# Patient Record
Sex: FEMALE | Race: WHITE | Employment: OTHER | URBAN - NONMETROPOLITAN AREA
[De-identification: names, ages, dates, MRNs, and addresses within clinical notes are randomized per-mention and may not be internally consistent; named-entity substitution may affect disease eponyms.]

---

## 2017-01-23 ENCOUNTER — OFFICE VISIT (OUTPATIENT)
Dept: PHYSICAL MEDICINE AND REHAB | Age: 52
End: 2017-01-23

## 2017-01-23 VITALS
WEIGHT: 166 LBS | BODY MASS INDEX: 26.68 KG/M2 | SYSTOLIC BLOOD PRESSURE: 123 MMHG | DIASTOLIC BLOOD PRESSURE: 71 MMHG | HEART RATE: 69 BPM | HEIGHT: 66 IN

## 2017-01-23 DIAGNOSIS — G82.54 INCOMPLETE QUADRIPLEGIA AT C5-C8 LEVEL (HCC): Primary | ICD-10-CM

## 2017-01-23 PROCEDURE — 99214 OFFICE O/P EST MOD 30 MIN: CPT | Performed by: PHYSICAL MEDICINE & REHABILITATION

## 2017-01-23 RX ORDER — DULOXETIN HYDROCHLORIDE 30 MG/1
90 CAPSULE, DELAYED RELEASE ORAL DAILY
Qty: 90 CAPSULE | Refills: 5 | Status: SHIPPED | OUTPATIENT
Start: 2017-01-23 | End: 2017-08-09

## 2017-04-25 ENCOUNTER — OFFICE VISIT (OUTPATIENT)
Dept: PHYSICAL MEDICINE AND REHAB | Age: 52
End: 2017-04-25

## 2017-04-25 VITALS
DIASTOLIC BLOOD PRESSURE: 77 MMHG | SYSTOLIC BLOOD PRESSURE: 138 MMHG | WEIGHT: 169 LBS | BODY MASS INDEX: 27.16 KG/M2 | HEIGHT: 66 IN | HEART RATE: 79 BPM

## 2017-04-25 DIAGNOSIS — G82.54 INCOMPLETE QUADRIPLEGIA AT C5-C8 LEVEL (HCC): Primary | ICD-10-CM

## 2017-04-25 PROCEDURE — 99213 OFFICE O/P EST LOW 20 MIN: CPT | Performed by: NURSE PRACTITIONER

## 2017-04-25 RX ORDER — GABAPENTIN 600 MG/1
TABLET ORAL
Qty: 90 TABLET | Refills: 5 | Status: SHIPPED | OUTPATIENT
Start: 2017-04-25 | End: 2017-07-25 | Stop reason: DRUGHIGH

## 2017-04-25 RX ORDER — TRAMADOL HYDROCHLORIDE 50 MG/1
50 TABLET ORAL 2 TIMES DAILY PRN
Qty: 30 TABLET | Refills: 0 | Status: SHIPPED | OUTPATIENT
Start: 2017-04-25 | End: 2017-07-25 | Stop reason: SDUPTHER

## 2017-04-25 RX ORDER — AMITRIPTYLINE HYDROCHLORIDE 25 MG/1
25 TABLET, FILM COATED ORAL NIGHTLY
Qty: 30 TABLET | Refills: 5 | Status: SHIPPED | OUTPATIENT
Start: 2017-04-25 | End: 2017-07-25 | Stop reason: DRUGHIGH

## 2017-05-16 ENCOUNTER — OFFICE VISIT (OUTPATIENT)
Dept: PHYSICAL MEDICINE AND REHAB | Age: 52
End: 2017-05-16

## 2017-05-16 VITALS
SYSTOLIC BLOOD PRESSURE: 120 MMHG | BODY MASS INDEX: 27.18 KG/M2 | DIASTOLIC BLOOD PRESSURE: 77 MMHG | WEIGHT: 169.09 LBS | HEART RATE: 73 BPM | HEIGHT: 66 IN

## 2017-05-16 DIAGNOSIS — M25.532 LEFT WRIST PAIN: ICD-10-CM

## 2017-05-16 DIAGNOSIS — G82.54 INCOMPLETE QUADRIPLEGIA AT C5-C8 LEVEL (HCC): ICD-10-CM

## 2017-05-16 DIAGNOSIS — G82.54 INCOMPLETE QUADRIPLEGIA AT C5-C8 LEVEL (HCC): Primary | ICD-10-CM

## 2017-05-16 DIAGNOSIS — T79.2XXD TRAUMATIC SEROMA OF LEFT THIGH, SUBSEQUENT ENCOUNTER: ICD-10-CM

## 2017-05-16 DIAGNOSIS — S70.02XS HEMATOMA OF LEFT HIP, SEQUELA: ICD-10-CM

## 2017-05-16 DIAGNOSIS — G89.29 CHRONIC MIDLINE LOW BACK PAIN, WITH SCIATICA PRESENCE UNSPECIFIED: ICD-10-CM

## 2017-05-16 DIAGNOSIS — M54.5 CHRONIC MIDLINE LOW BACK PAIN, WITH SCIATICA PRESENCE UNSPECIFIED: ICD-10-CM

## 2017-05-16 DIAGNOSIS — M79.604 PAIN IN BOTH LOWER EXTREMITIES: ICD-10-CM

## 2017-05-16 DIAGNOSIS — M79.605 PAIN IN BOTH LOWER EXTREMITIES: ICD-10-CM

## 2017-05-16 DIAGNOSIS — G62.9 NEUROPATHY: ICD-10-CM

## 2017-05-16 PROCEDURE — 99244 OFF/OP CNSLTJ NEW/EST MOD 40: CPT | Performed by: NURSE PRACTITIONER

## 2017-05-16 RX ORDER — BUPRENORPHINE 5 UG/H
1 PATCH TRANSDERMAL WEEKLY
Qty: 4 PATCH | Refills: 0 | Status: SHIPPED | OUTPATIENT
Start: 2017-05-16 | End: 2017-06-13

## 2017-05-16 RX ORDER — AMITRIPTYLINE HYDROCHLORIDE 50 MG/1
50 TABLET, FILM COATED ORAL NIGHTLY
Qty: 30 TABLET | Refills: 1 | Status: SHIPPED | OUTPATIENT
Start: 2017-05-16 | End: 2017-07-19 | Stop reason: SDUPTHER

## 2017-05-16 RX ORDER — GABAPENTIN 800 MG/1
800 TABLET ORAL 3 TIMES DAILY
Qty: 90 TABLET | Refills: 1 | Status: SHIPPED | OUTPATIENT
Start: 2017-05-16 | End: 2017-07-19 | Stop reason: SDUPTHER

## 2017-05-16 RX ORDER — BUPRENORPHINE 5 UG/H
1 PATCH TRANSDERMAL WEEKLY
Qty: 2 PATCH | Refills: 0 | Status: SHIPPED | OUTPATIENT
Start: 2017-05-16 | End: 2017-05-16 | Stop reason: SDUPTHER

## 2017-05-16 ASSESSMENT — ENCOUNTER SYMPTOMS
DIARRHEA: 0
PHOTOPHOBIA: 0
ABDOMINAL PAIN: 0
VOMITING: 0
COUGH: 0
NAUSEA: 0
BACK PAIN: 0
SINUS PRESSURE: 0
EYE PAIN: 0
SHORTNESS OF BREATH: 0
WHEEZING: 0
COLOR CHANGE: 0
SORE THROAT: 0
CHEST TIGHTNESS: 0
RHINORRHEA: 0
CONSTIPATION: 0

## 2017-05-22 RX ORDER — TRAMADOL HYDROCHLORIDE 50 MG/1
TABLET ORAL
Qty: 112 TABLET | Refills: 0 | Status: SHIPPED | OUTPATIENT
Start: 2017-05-22 | End: 2017-07-25 | Stop reason: SDUPTHER

## 2017-07-14 DIAGNOSIS — T79.2XXD TRAUMATIC SEROMA OF LEFT THIGH, SUBSEQUENT ENCOUNTER: ICD-10-CM

## 2017-07-14 DIAGNOSIS — G62.9 NEUROPATHY: ICD-10-CM

## 2017-07-14 DIAGNOSIS — M54.5 CHRONIC MIDLINE LOW BACK PAIN, WITH SCIATICA PRESENCE UNSPECIFIED: ICD-10-CM

## 2017-07-14 DIAGNOSIS — S70.02XS HEMATOMA OF LEFT HIP, SEQUELA: ICD-10-CM

## 2017-07-14 DIAGNOSIS — M79.604 PAIN IN BOTH LOWER EXTREMITIES: ICD-10-CM

## 2017-07-14 DIAGNOSIS — M79.605 PAIN IN BOTH LOWER EXTREMITIES: ICD-10-CM

## 2017-07-14 DIAGNOSIS — G82.54 INCOMPLETE QUADRIPLEGIA AT C5-C8 LEVEL (HCC): ICD-10-CM

## 2017-07-14 DIAGNOSIS — G89.29 CHRONIC MIDLINE LOW BACK PAIN, WITH SCIATICA PRESENCE UNSPECIFIED: ICD-10-CM

## 2017-07-14 DIAGNOSIS — M25.532 LEFT WRIST PAIN: ICD-10-CM

## 2017-07-14 RX ORDER — TRAMADOL HYDROCHLORIDE 50 MG/1
TABLET ORAL
Qty: 240 TABLET | Refills: 0 | Status: CANCELLED | OUTPATIENT
Start: 2017-07-14

## 2017-07-14 RX ORDER — GABAPENTIN 800 MG/1
800 TABLET ORAL 3 TIMES DAILY
Qty: 90 TABLET | Refills: 1 | Status: CANCELLED | OUTPATIENT
Start: 2017-07-14 | End: 2017-08-13

## 2017-07-14 RX ORDER — AMITRIPTYLINE HYDROCHLORIDE 50 MG/1
50 TABLET, FILM COATED ORAL NIGHTLY
Qty: 30 TABLET | Refills: 1 | Status: CANCELLED | OUTPATIENT
Start: 2017-07-14 | End: 2017-08-13

## 2017-07-19 DIAGNOSIS — G89.29 CHRONIC MIDLINE LOW BACK PAIN, WITH SCIATICA PRESENCE UNSPECIFIED: ICD-10-CM

## 2017-07-19 DIAGNOSIS — S70.02XS HEMATOMA OF LEFT HIP, SEQUELA: ICD-10-CM

## 2017-07-19 DIAGNOSIS — G62.9 NEUROPATHY: ICD-10-CM

## 2017-07-19 DIAGNOSIS — M25.532 LEFT WRIST PAIN: ICD-10-CM

## 2017-07-19 DIAGNOSIS — G82.54 INCOMPLETE QUADRIPLEGIA AT C5-C8 LEVEL (HCC): ICD-10-CM

## 2017-07-19 DIAGNOSIS — M79.605 PAIN IN BOTH LOWER EXTREMITIES: ICD-10-CM

## 2017-07-19 DIAGNOSIS — M54.5 CHRONIC MIDLINE LOW BACK PAIN, WITH SCIATICA PRESENCE UNSPECIFIED: ICD-10-CM

## 2017-07-19 DIAGNOSIS — M79.604 PAIN IN BOTH LOWER EXTREMITIES: ICD-10-CM

## 2017-07-19 DIAGNOSIS — T79.2XXD TRAUMATIC SEROMA OF LEFT THIGH, SUBSEQUENT ENCOUNTER: ICD-10-CM

## 2017-07-19 RX ORDER — TRAMADOL HYDROCHLORIDE 50 MG/1
50 TABLET ORAL EVERY 6 HOURS PRN
Qty: 120 TABLET | Refills: 0 | Status: SHIPPED | OUTPATIENT
Start: 2017-07-19 | End: 2017-08-18

## 2017-07-19 RX ORDER — AMITRIPTYLINE HYDROCHLORIDE 50 MG/1
50 TABLET, FILM COATED ORAL NIGHTLY
Qty: 30 TABLET | Refills: 1 | Status: SHIPPED | OUTPATIENT
Start: 2017-07-19 | End: 2017-09-18 | Stop reason: SDUPTHER

## 2017-07-19 RX ORDER — HYDROCODONE BITARTRATE AND ACETAMINOPHEN 7.5; 325 MG/1; MG/1
1 TABLET ORAL 2 TIMES DAILY PRN
Qty: 60 TABLET | Refills: 0 | Status: SHIPPED | OUTPATIENT
Start: 2017-07-19 | End: 2017-08-18

## 2017-07-19 RX ORDER — GABAPENTIN 800 MG/1
800 TABLET ORAL 3 TIMES DAILY
Qty: 90 TABLET | Refills: 1 | Status: SHIPPED | OUTPATIENT
Start: 2017-07-19 | End: 2017-08-09

## 2017-07-25 ENCOUNTER — OFFICE VISIT (OUTPATIENT)
Dept: PHYSICAL MEDICINE AND REHAB | Age: 52
End: 2017-07-25
Payer: COMMERCIAL

## 2017-07-25 VITALS
DIASTOLIC BLOOD PRESSURE: 81 MMHG | HEART RATE: 85 BPM | SYSTOLIC BLOOD PRESSURE: 114 MMHG | WEIGHT: 171.96 LBS | BODY MASS INDEX: 27.64 KG/M2 | HEIGHT: 66 IN

## 2017-07-25 VITALS
HEART RATE: 85 BPM | WEIGHT: 172 LBS | SYSTOLIC BLOOD PRESSURE: 114 MMHG | BODY MASS INDEX: 27.64 KG/M2 | DIASTOLIC BLOOD PRESSURE: 81 MMHG | HEIGHT: 66 IN

## 2017-07-25 DIAGNOSIS — M79.605 PAIN IN BOTH LOWER EXTREMITIES: ICD-10-CM

## 2017-07-25 DIAGNOSIS — M54.5 CHRONIC MIDLINE LOW BACK PAIN, WITH SCIATICA PRESENCE UNSPECIFIED: ICD-10-CM

## 2017-07-25 DIAGNOSIS — G89.29 CHRONIC MIDLINE LOW BACK PAIN, WITH SCIATICA PRESENCE UNSPECIFIED: ICD-10-CM

## 2017-07-25 DIAGNOSIS — G62.9 NEUROPATHY: ICD-10-CM

## 2017-07-25 DIAGNOSIS — M79.604 PAIN IN BOTH LOWER EXTREMITIES: ICD-10-CM

## 2017-07-25 DIAGNOSIS — G82.54 INCOMPLETE QUADRIPLEGIA AT C5-C8 LEVEL (HCC): Primary | ICD-10-CM

## 2017-07-25 PROCEDURE — 99214 OFFICE O/P EST MOD 30 MIN: CPT | Performed by: PHYSICAL MEDICINE & REHABILITATION

## 2017-07-25 PROCEDURE — 99213 OFFICE O/P EST LOW 20 MIN: CPT | Performed by: NURSE PRACTITIONER

## 2017-07-25 ASSESSMENT — ENCOUNTER SYMPTOMS
RHINORRHEA: 0
WHEEZING: 0
DIARRHEA: 0
EYE PAIN: 0
CHEST TIGHTNESS: 0
ABDOMINAL PAIN: 0
SHORTNESS OF BREATH: 0
SINUS PRESSURE: 0
BACK PAIN: 0
NAUSEA: 0
COUGH: 0
COLOR CHANGE: 0
VOMITING: 0
CONSTIPATION: 0
PHOTOPHOBIA: 0
SORE THROAT: 0

## 2017-07-29 ENCOUNTER — HOSPITAL ENCOUNTER (EMERGENCY)
Age: 52
Discharge: HOME | End: 2017-07-29
Attending: NURSE PRACTITIONER
Payer: COMMERCIAL

## 2017-07-29 ENCOUNTER — HOSPITAL ENCOUNTER (EMERGENCY)
Dept: GENERAL RADIOLOGY | Age: 52
Discharge: HOME | End: 2017-07-29
Payer: COMMERCIAL

## 2017-07-29 VITALS
TEMPERATURE: 98 F | BODY MASS INDEX: 27 KG/M2 | HEIGHT: 67 IN | SYSTOLIC BLOOD PRESSURE: 126 MMHG | DIASTOLIC BLOOD PRESSURE: 68 MMHG | HEART RATE: 70 BPM | OXYGEN SATURATION: 96 % | RESPIRATION RATE: 18 BRPM | WEIGHT: 172 LBS

## 2017-07-29 DIAGNOSIS — S80.02XA CONTUSION OF LEFT KNEE, INITIAL ENCOUNTER: ICD-10-CM

## 2017-07-29 DIAGNOSIS — S60.512A ABRASION OF LEFT HAND, INITIAL ENCOUNTER: ICD-10-CM

## 2017-07-29 DIAGNOSIS — S63.92XA SPRAIN OF LEFT HAND, INITIAL ENCOUNTER: ICD-10-CM

## 2017-07-29 DIAGNOSIS — S80.212A ABRASION, KNEE, LEFT, INITIAL ENCOUNTER: Primary | ICD-10-CM

## 2017-07-29 PROCEDURE — 73130 X-RAY EXAM OF HAND: CPT

## 2017-07-29 PROCEDURE — 99203 OFFICE O/P NEW LOW 30 MIN: CPT | Performed by: NURSE PRACTITIONER

## 2017-07-29 PROCEDURE — 6370000000 HC RX 637 (ALT 250 FOR IP): Performed by: NURSE PRACTITIONER

## 2017-07-29 PROCEDURE — A6447 CONFORM BAND S W >=5"/YD: HCPCS

## 2017-07-29 PROCEDURE — 73564 X-RAY EXAM KNEE 4 OR MORE: CPT

## 2017-07-29 RX ORDER — BACITRACIN, NEOMYCIN, POLYMYXIN B 400; 3.5; 5 [USP'U]/G; MG/G; [USP'U]/G
OINTMENT TOPICAL ONCE
Status: COMPLETED | OUTPATIENT
Start: 2017-07-29 | End: 2017-07-29

## 2017-07-29 RX ADMIN — BACITRACIN, NEOMYCIN, POLYMYXIN B 3.5 G: 400; 3.5; 5 OINTMENT TOPICAL at 10:49

## 2017-07-29 ASSESSMENT — PAIN DESCRIPTION - PAIN TYPE: TYPE: ACUTE PAIN

## 2017-07-29 ASSESSMENT — PAIN DESCRIPTION - DESCRIPTORS: DESCRIPTORS: SHARP

## 2017-07-29 ASSESSMENT — PAIN DESCRIPTION - ONSET: ONSET: SUDDEN

## 2017-07-29 ASSESSMENT — PAIN SCALES - GENERAL: PAINLEVEL_OUTOF10: 8

## 2017-07-29 ASSESSMENT — PAIN DESCRIPTION - ORIENTATION: ORIENTATION: LEFT

## 2017-07-29 ASSESSMENT — PAIN DESCRIPTION - PROGRESSION: CLINICAL_PROGRESSION: NOT CHANGED

## 2017-07-29 ASSESSMENT — PAIN DESCRIPTION - FREQUENCY: FREQUENCY: CONTINUOUS

## 2017-07-29 ASSESSMENT — PAIN DESCRIPTION - LOCATION: LOCATION: HAND;KNEE

## 2017-08-09 ENCOUNTER — OFFICE VISIT (OUTPATIENT)
Dept: FAMILY MEDICINE CLINIC | Age: 52
End: 2017-08-09
Payer: COMMERCIAL

## 2017-08-09 VITALS
SYSTOLIC BLOOD PRESSURE: 102 MMHG | BODY MASS INDEX: 26.37 KG/M2 | WEIGHT: 168 LBS | HEIGHT: 67 IN | DIASTOLIC BLOOD PRESSURE: 64 MMHG | HEART RATE: 70 BPM

## 2017-08-09 DIAGNOSIS — D50.9 IRON DEFICIENCY ANEMIA, UNSPECIFIED IRON DEFICIENCY ANEMIA TYPE: ICD-10-CM

## 2017-08-09 DIAGNOSIS — E11.9 TYPE 2 DIABETES MELLITUS WITHOUT COMPLICATION, WITHOUT LONG-TERM CURRENT USE OF INSULIN (HCC): Primary | Chronic | ICD-10-CM

## 2017-08-09 DIAGNOSIS — R91.1 PULMONARY NODULE: Chronic | ICD-10-CM

## 2017-08-09 DIAGNOSIS — Z13.220 SCREENING FOR HYPERLIPIDEMIA: ICD-10-CM

## 2017-08-09 DIAGNOSIS — Z12.31 ENCOUNTER FOR SCREENING MAMMOGRAM FOR BREAST CANCER: ICD-10-CM

## 2017-08-09 DIAGNOSIS — E55.9 VITAMIN D DEFICIENCY: ICD-10-CM

## 2017-08-09 LAB
CREATININE URINE POCT: 100
HBA1C MFR BLD: 6.7 %
MICROALBUMIN/CREAT 24H UR: 30 MG/G{CREAT}
MICROALBUMIN/CREAT UR-RTO: <30

## 2017-08-09 PROCEDURE — 82044 UR ALBUMIN SEMIQUANTITATIVE: CPT | Performed by: FAMILY MEDICINE

## 2017-08-09 PROCEDURE — 83036 HEMOGLOBIN GLYCOSYLATED A1C: CPT | Performed by: FAMILY MEDICINE

## 2017-08-09 PROCEDURE — 99214 OFFICE O/P EST MOD 30 MIN: CPT | Performed by: FAMILY MEDICINE

## 2017-08-09 RX ORDER — DULOXETIN HYDROCHLORIDE 60 MG/1
60 CAPSULE, DELAYED RELEASE ORAL DAILY
COMMUNITY
End: 2017-10-16 | Stop reason: SDUPTHER

## 2017-08-09 RX ORDER — GABAPENTIN 400 MG/1
400 CAPSULE ORAL DAILY
COMMUNITY
End: 2017-09-18 | Stop reason: SDUPTHER

## 2017-08-09 ASSESSMENT — ENCOUNTER SYMPTOMS
CONSTIPATION: 0
DIARRHEA: 0
CHEST TIGHTNESS: 0
COLOR CHANGE: 0
SHORTNESS OF BREATH: 0
NAUSEA: 0
EYE REDNESS: 0
VOMITING: 0
EYE DISCHARGE: 0

## 2017-08-09 ASSESSMENT — PATIENT HEALTH QUESTIONNAIRE - PHQ9
1. LITTLE INTEREST OR PLEASURE IN DOING THINGS: 0
SUM OF ALL RESPONSES TO PHQ QUESTIONS 1-9: 0
2. FEELING DOWN, DEPRESSED OR HOPELESS: 0
SUM OF ALL RESPONSES TO PHQ9 QUESTIONS 1 & 2: 0

## 2017-08-10 ENCOUNTER — TELEPHONE (OUTPATIENT)
Dept: FAMILY MEDICINE CLINIC | Age: 52
End: 2017-08-10

## 2017-08-11 LAB
BASOPHILS ABSOLUTE: 0 /ΜL
BASOPHILS RELATIVE PERCENT: 0.5 %
BUN BLDV-MCNC: 10 MG/DL
CALCIUM SERPL-MCNC: 9.4 MG/DL
CHLORIDE BLD-SCNC: 105 MMOL/L
CHOLESTEROL, TOTAL: 145 MG/DL
CHOLESTEROL/HDL RATIO: 2.4
CO2: 30 MMOL/L
CREAT SERPL-MCNC: 0.6 MG/DL
EOSINOPHILS ABSOLUTE: 0.1 /ΜL
EOSINOPHILS RELATIVE PERCENT: 2 %
FERRITIN: 12.3 NG/ML (ref 9–150)
GFR CALCULATED: 101
GLUCOSE BLD-MCNC: 97 MG/DL
HCT VFR BLD CALC: 38 % (ref 36–46)
HDLC SERPL-MCNC: 60 MG/DL (ref 35–70)
HEMOGLOBIN: 12.3 G/DL (ref 12–16)
IRON: 79
LDL CHOLESTEROL CALCULATED: 71 MG/DL (ref 0–160)
LYMPHOCYTES ABSOLUTE: 2.3 /ΜL
LYMPHOCYTES RELATIVE PERCENT: 35.1 %
MCH RBC QN AUTO: 27.2 PG
MCHC RBC AUTO-ENTMCNC: 32.4 G/DL
MCV RBC AUTO: 84.1 FL
MONOCYTES ABSOLUTE: 0.6 /ΜL
MONOCYTES RELATIVE PERCENT: 9.3 %
NEUTROPHILS ABSOLUTE: 3.5 /ΜL
NEUTROPHILS RELATIVE PERCENT: 52.8 %
PLATELET # BLD: 272 K/ΜL
PMV BLD AUTO: NORMAL FL
POTASSIUM SERPL-SCNC: 3.7 MMOL/L
RBC # BLD: 4.52 10^6/ΜL
SODIUM BLD-SCNC: 141 MMOL/L
TOTAL IRON BINDING CAPACITY: 385
TRIGL SERPL-MCNC: 70 MG/DL
VITAMIN D 25-HYDROXY: 25
VITAMIN D2, 25 HYDROXY: NORMAL
VITAMIN D3,25 HYDROXY: NORMAL
VLDLC SERPL CALC-MCNC: 14 MG/DL
WBC # BLD: 6.6 10^3/ML

## 2017-08-16 ENCOUNTER — TELEPHONE (OUTPATIENT)
Dept: FAMILY MEDICINE CLINIC | Age: 52
End: 2017-08-16

## 2017-08-16 DIAGNOSIS — E55.9 VITAMIN D DEFICIENCY: Primary | ICD-10-CM

## 2017-08-22 RX ORDER — ERGOCALCIFEROL 1.25 MG/1
50000 CAPSULE ORAL WEEKLY
Qty: 12 CAPSULE | Refills: 0 | Status: SHIPPED | OUTPATIENT
Start: 2017-08-22 | End: 2020-09-02

## 2017-08-24 DIAGNOSIS — D50.9 IRON DEFICIENCY ANEMIA, UNSPECIFIED IRON DEFICIENCY ANEMIA TYPE: ICD-10-CM

## 2017-08-24 DIAGNOSIS — E61.1 IRON DEFICIENCY: Primary | ICD-10-CM

## 2017-08-24 DIAGNOSIS — Z13.220 SCREENING FOR HYPERLIPIDEMIA: ICD-10-CM

## 2017-08-24 DIAGNOSIS — E55.9 VITAMIN D DEFICIENCY: ICD-10-CM

## 2017-08-24 DIAGNOSIS — E11.9 TYPE 2 DIABETES MELLITUS WITHOUT COMPLICATION, WITHOUT LONG-TERM CURRENT USE OF INSULIN (HCC): Chronic | ICD-10-CM

## 2017-08-24 RX ORDER — LANOLIN ALCOHOL/MO/W.PET/CERES
325 CREAM (GRAM) TOPICAL 2 TIMES DAILY
Qty: 180 TABLET | Refills: 0 | Status: ON HOLD | OUTPATIENT
Start: 2017-08-24 | End: 2017-10-16 | Stop reason: ALTCHOICE

## 2017-08-31 ENCOUNTER — TELEPHONE (OUTPATIENT)
Dept: FAMILY MEDICINE CLINIC | Age: 52
End: 2017-08-31

## 2017-08-31 DIAGNOSIS — E61.1 IRON DEFICIENCY: ICD-10-CM

## 2017-08-31 DIAGNOSIS — Z98.84 HISTORY OF GASTRIC BYPASS: Primary | ICD-10-CM

## 2017-09-05 ENCOUNTER — OFFICE VISIT (OUTPATIENT)
Dept: PHYSICAL MEDICINE AND REHAB | Age: 52
End: 2017-09-05
Payer: COMMERCIAL

## 2017-09-05 VITALS
BODY MASS INDEX: 26.06 KG/M2 | HEIGHT: 67 IN | DIASTOLIC BLOOD PRESSURE: 68 MMHG | WEIGHT: 166 LBS | HEART RATE: 74 BPM | SYSTOLIC BLOOD PRESSURE: 107 MMHG

## 2017-09-05 DIAGNOSIS — M54.5 CHRONIC MIDLINE LOW BACK PAIN, WITH SCIATICA PRESENCE UNSPECIFIED: ICD-10-CM

## 2017-09-05 DIAGNOSIS — G89.29 CHRONIC MIDLINE LOW BACK PAIN, WITH SCIATICA PRESENCE UNSPECIFIED: ICD-10-CM

## 2017-09-05 DIAGNOSIS — G89.29 CHRONIC BILATERAL LOW BACK PAIN, WITH SCIATICA PRESENCE UNSPECIFIED: ICD-10-CM

## 2017-09-05 DIAGNOSIS — M54.5 CHRONIC BILATERAL LOW BACK PAIN, WITH SCIATICA PRESENCE UNSPECIFIED: ICD-10-CM

## 2017-09-05 DIAGNOSIS — G62.9 NEUROPATHY: ICD-10-CM

## 2017-09-05 DIAGNOSIS — G82.54 INCOMPLETE QUADRIPLEGIA AT C5-C8 LEVEL (HCC): Primary | ICD-10-CM

## 2017-09-05 PROCEDURE — 99213 OFFICE O/P EST LOW 20 MIN: CPT | Performed by: NURSE PRACTITIONER

## 2017-09-05 RX ORDER — HYDROCODONE BITARTRATE AND ACETAMINOPHEN 7.5; 325 MG/1; MG/1
1 TABLET ORAL 2 TIMES DAILY PRN
Qty: 60 TABLET | Refills: 0 | Status: SHIPPED | OUTPATIENT
Start: 2017-09-05 | End: 2017-10-02 | Stop reason: SDUPTHER

## 2017-09-05 RX ORDER — TRAMADOL HYDROCHLORIDE 50 MG/1
TABLET ORAL
Qty: 120 TABLET | Refills: 0 | Status: SHIPPED | OUTPATIENT
Start: 2017-09-05 | End: 2017-12-11 | Stop reason: SDUPTHER

## 2017-09-05 ASSESSMENT — ENCOUNTER SYMPTOMS
CHEST TIGHTNESS: 0
VOMITING: 0
SORE THROAT: 0
SHORTNESS OF BREATH: 0
RHINORRHEA: 0
CONSTIPATION: 0
PHOTOPHOBIA: 0
ALLERGIC/IMMUNOLOGIC NEGATIVE: 1
COUGH: 0
EYES NEGATIVE: 1
DIARRHEA: 0
ABDOMINAL PAIN: 0
BACK PAIN: 1
WHEEZING: 0
RESPIRATORY NEGATIVE: 1
SINUS PRESSURE: 0
NAUSEA: 0
EYE PAIN: 0
GASTROINTESTINAL NEGATIVE: 1
COLOR CHANGE: 0

## 2017-09-07 ENCOUNTER — TELEPHONE (OUTPATIENT)
Dept: FAMILY MEDICINE CLINIC | Age: 52
End: 2017-09-07

## 2017-09-07 NOTE — TELEPHONE ENCOUNTER
Moises Jason is starting with her Iron infusions this week  And is wondering when she will need follow up labs drawn. She states you had told her to wait 12 weeks after starting the Vitamin D before rechecking that one. Do you want her to get all of them drawn together? We are to call her back.

## 2017-09-08 NOTE — TELEPHONE ENCOUNTER
We can recheck the labs at the same time. As far as her chest CT for the pulmonary nodule, I have been going through her records and cannot determine which lung it was in. .. If the patient can provide us with this information, we would have more luck getting it approved (was formerly denied by insurance).

## 2017-09-11 ENCOUNTER — HOSPITAL ENCOUNTER (OUTPATIENT)
Dept: NURSING | Age: 52
Discharge: HOME | End: 2017-09-11
Payer: COMMERCIAL

## 2017-09-11 ENCOUNTER — HOSPITAL ENCOUNTER (OUTPATIENT)
Dept: PHYSICAL THERAPY | Age: 52
Setting detail: THERAPIES SERIES
Discharge: HOME | End: 2017-09-11
Payer: COMMERCIAL

## 2017-09-11 ENCOUNTER — TELEPHONE (OUTPATIENT)
Dept: PHYSICAL MEDICINE AND REHAB | Age: 52
End: 2017-09-11

## 2017-09-11 VITALS
BODY MASS INDEX: 25.99 KG/M2 | DIASTOLIC BLOOD PRESSURE: 61 MMHG | RESPIRATION RATE: 16 BRPM | SYSTOLIC BLOOD PRESSURE: 111 MMHG | TEMPERATURE: 95.5 F | WEIGHT: 165.6 LBS | HEART RATE: 69 BPM

## 2017-09-11 DIAGNOSIS — D50.9 IRON DEFICIENCY ANEMIA, UNSPECIFIED IRON DEFICIENCY ANEMIA TYPE: ICD-10-CM

## 2017-09-11 PROCEDURE — 6360000002 HC RX W HCPCS: Performed by: FAMILY MEDICINE

## 2017-09-11 PROCEDURE — 97110 THERAPEUTIC EXERCISES: CPT

## 2017-09-11 PROCEDURE — 97162 PT EVAL MOD COMPLEX 30 MIN: CPT

## 2017-09-11 PROCEDURE — G8979 MOBILITY GOAL STATUS: HCPCS

## 2017-09-11 PROCEDURE — 96365 THER/PROPH/DIAG IV INF INIT: CPT

## 2017-09-11 PROCEDURE — 2580000003 HC RX 258: Performed by: FAMILY MEDICINE

## 2017-09-11 PROCEDURE — G8978 MOBILITY CURRENT STATUS: HCPCS

## 2017-09-11 RX ORDER — 0.9 % SODIUM CHLORIDE 0.9 %
10 VIAL (ML) INJECTION ONCE
Status: CANCELLED | OUTPATIENT
Start: 2017-09-11 | End: 2017-09-11

## 2017-09-11 RX ORDER — SODIUM CHLORIDE 9 MG/ML
INJECTION, SOLUTION INTRAVENOUS CONTINUOUS
Status: CANCELLED | OUTPATIENT
Start: 2017-09-11

## 2017-09-11 RX ORDER — METHYLPREDNISOLONE SODIUM SUCCINATE 125 MG/2ML
125 INJECTION, POWDER, LYOPHILIZED, FOR SOLUTION INTRAMUSCULAR; INTRAVENOUS ONCE
Status: CANCELLED | OUTPATIENT
Start: 2017-09-11 | End: 2017-09-11

## 2017-09-11 RX ORDER — SODIUM CHLORIDE 0.9 % (FLUSH) 0.9 %
10 SYRINGE (ML) INJECTION PRN
Status: CANCELLED | OUTPATIENT
Start: 2017-09-11

## 2017-09-11 RX ORDER — DIPHENHYDRAMINE HYDROCHLORIDE 50 MG/ML
50 INJECTION INTRAMUSCULAR; INTRAVENOUS ONCE
Status: CANCELLED | OUTPATIENT
Start: 2017-09-11 | End: 2017-09-11

## 2017-09-11 RX ORDER — HEPARIN SODIUM (PORCINE) LOCK FLUSH IV SOLN 100 UNIT/ML 100 UNIT/ML
500 SOLUTION INTRAVENOUS PRN
Status: CANCELLED | OUTPATIENT
Start: 2017-09-11

## 2017-09-11 RX ORDER — SODIUM CHLORIDE 0.9 % (FLUSH) 0.9 %
5 SYRINGE (ML) INJECTION PRN
Status: CANCELLED | OUTPATIENT
Start: 2017-09-11

## 2017-09-11 RX ADMIN — IRON SUCROSE 200 MG: 20 INJECTION, SOLUTION INTRAVENOUS at 10:46

## 2017-09-11 ASSESSMENT — PAIN DESCRIPTION - DESCRIPTORS: DESCRIPTORS: BURNING;CONSTANT

## 2017-09-11 ASSESSMENT — PAIN SCALES - GENERAL: PAINLEVEL_OUTOF10: 8

## 2017-09-11 ASSESSMENT — PAIN - FUNCTIONAL ASSESSMENT: PAIN_FUNCTIONAL_ASSESSMENT: 0-10

## 2017-09-11 NOTE — IP AVS SNAPSHOT
Patient Information     Patient Name DEBBIE De Leon 1965         This is your updated medication list to keep with you all times      ASK your doctor about these medications     amitriptyline 50 MG tablet   Commonly known as:  ELAVIL   Take 1 tablet by mouth nightly       CYMBALTA 60 MG extended release capsule   Generic drug:  DULoxetine       docusate sodium 100 MG capsule   Commonly known as:  COLACE       ferrous sulfate 325 (65 Fe) MG EC tablet   Commonly known as:  FE TABS   Take 1 tablet by mouth 2 times daily       gabapentin 400 MG capsule   Commonly known as:  NEURONTIN       HAIR SKIN AND NAILS FORMULA PO       HYDROcodone-acetaminophen 7.5-325 MG per tablet   Commonly known as:  NORCO   Take 1 tablet by mouth 2 times daily as needed for Pain .       iron sucrose 20 MG/ML injection   Commonly known as:  VENOFER   Infuse 10 mLs intravenously every 3 days for 5 doses Infuse slowly over at least 5 minutes.        metFORMIN 500 MG tablet   Commonly known as:  GLUCOPHAGE   Take 1 tablet by mouth 2 times daily (with meals)       mirtazapine 15 MG tablet   Commonly known as:  REMERON   Take 1 tablet by mouth nightly       traMADol 50 MG tablet   Commonly known as:  ULTRAM   1-2 tabs BID PRN       vitamin B-12 500 MCG tablet   Commonly known as:  CYANOCOBALAMIN       vitamin D 2000 units Caps capsule       vitamin D 00847 units Caps capsule   Commonly known as:  ERGOCALCIFEROL   Take 1 capsule by mouth once a week for 12 doses

## 2017-09-11 NOTE — PROGRESS NOTES
0793 Alert female admitted for number one iron infusion. 1110 No complaints voiced. 1155 Infusion complete, tolerated well. Home instructions to pt verbalized understanding. Gait steady. 56 Discharged ambulatory stable home.       __met__ Safety:       (Environmental)   [place] to environment   Ensure ID band is correct and in place/ allergy band as needed   Assess for fall risk   Initiate fall precautions as applicable (fall band, side rails, etc.)   Call light within reach   Bed in low position/ wheels locked    __nm__ Pain:        Assess pain level and characteristics   Administer analgesics as ordered   Assess effectiveness of pain management and report to MD as needed     __metssess baseline knowledge   Provide teaching at level of understanding   Provide teaching via preferred learning method   Evaluate teaching effectiveness    __

## 2017-09-11 NOTE — IP AVS SNAPSHOT
After Visit Summary  (Discharge Instructions)    Medication List for Home    Based on the information you provided to us as well as any changes during this visit, the following is your updated medication list.  Compare this with your prescription bottles at home. If you have any questions or concerns, contact your primary care physician's office. Daily Medication List (This medication list can be shared with any healthcare provider who is helping you manage your medications)      ASK your doctor about these medications if you have questions        Last Dose    Next Dose Due AM NOON PM NIGHT    amitriptyline 50 MG tablet   Commonly known as:  ELAVIL   Take 1 tablet by mouth nightly                                         CYMBALTA 60 MG extended release capsule   Generic drug:  DULoxetine   Take 60 mg by mouth daily                                         docusate sodium 100 MG capsule   Commonly known as:  COLACE   Take 100 mg by mouth as needed for Constipation                                         ferrous sulfate 325 (65 Fe) MG EC tablet   Commonly known as:  FE TABS   Take 1 tablet by mouth 2 times daily                                         gabapentin 400 MG capsule   Commonly known as:  NEURONTIN   Take 400 mg by mouth daily                                         HAIR SKIN AND NAILS FORMULA PO   Take by mouth                                         HYDROcodone-acetaminophen 7.5-325 MG per tablet   Commonly known as:  NORCO   Take 1 tablet by mouth 2 times daily as needed for Pain .                                         iron sucrose 20 MG/ML injection   Commonly known as:  VENOFER   Infuse 10 mLs intravenously every 3 days for 5 doses Infuse slowly over at least 5 minutes.                                          metFORMIN 500 MG tablet   Commonly known as:  GLUCOPHAGE   Take 1 tablet by mouth 2 times daily (with meals)                                         mirtazapine 15 MG tablet Commonly known as:  REMERON   Take 1 tablet by mouth nightly                                         traMADol 50 MG tablet   Commonly known as:  ULTRAM   1-2 tabs BID PRN                                         vitamin B-12 500 MCG tablet   Commonly known as:  CYANOCOBALAMIN   Take 500 mcg by mouth daily                                         vitamin D 2000 units Caps capsule   Take by mouth daily                                         vitamin D 22489 units Caps capsule   Commonly known as:  ERGOCALCIFEROL   Take 1 capsule by mouth once a week for 12 doses                                                 Allergies as of 2017        Reactions    Ibuprofen     Unable to take due to gastric bypass      Immunizations as of 2017     Name Date Dose VIS Date Route    Influenza Virus Vaccine 2015 0.5 mL 2014 Intramuscular    Tdap (Boostrix, Adacel) 2015 0.5 mL 2015 Intramuscular      Last Vitals          Most Recent Value    Temp  95.5 °F (35.3 °C)    Pulse  93    Resp  16    BP  118/61         After Visit Summary    This summary was created for you. Thank you for entrusting your care to us. The following information includes details about your hospital/visit stay along with steps you should take to help with your recovery once you leave the hospital.  In this packet, you will find information about the topics listed below:    · Instructions about your medications including a list of your home medications  · A summary of your hospital visit  · Follow-up appointments once you have left the hospital  · Your care plan at home      You may receive a survey regarding the care you received during your stay. Your input is valuable to us. We encourage you to complete and return your survey in the envelope provided. We hope you will choose us in the future for your healthcare needs.           Patient Information     Patient Name DEBBIE Loya Haverhill 1965      Care Provided at: Name Address Phone       [address] [phone]            Your Visit    Here you will find information about your visit, including the reason for your visit. Please take this sheet with you when you visit your doctor or other health care provider in the future. It will help determine the best possible medical care for you at that time. If you have any questions once you leave the hospital, please call the department phone number listed below. Why you were here     Your primary diagnosis was:  Not on File      Visit Information     Date & Time Department Dept. Phone    9/11/2017 Cintia Dasilva [phone]       Follow-up Appointments    Below is a list of your follow-up and future appointments. This may not be a complete list as you may have made appointments directly with providers that we are not aware of or your providers may have made some for you. Please call your providers to confirm appointments. It is important to keep your appointments. Please bring your current insurance card, photo ID, co-pay, and all medication bottles to your appointment. If self-pay, payment is expected at the time of service.         Future Appointments     9/12/2017 9:00 AM     Appointment with [facility] MRI RM1 at [address] [facility] ([phone])   PREPS:  * Arrive 30 minutes prior * Please report to Main Radiology 1st floor   [address] [address]       9/13/2017 6:00 PM     Appointment with Yoly Godinez PTA at [address] [facility] ([phone])   [place] [address]       9/14/2017 8:00 AM     Appointment with [address] at [address] ([phone])   PREPS:  * Bring list of current medications * Please report to second floor, 2C (main elevators, turn right) 10 minutes prior to appointment ght)   [address] [address]       9/18/2017 8:00 AM Appointment with STR EXAM ROOM 5 at [place] [address] ([phone])   PREPS:  * Bring list of current medications * Please report to second floor, 2C (main elevators, turn right) 10 minutes prior to appointment ght)   [address] [address]       9/20/2017 6:15 PM     Appointment with Yue Howell PTA at [address] [facility] ([phone])   [facility] [address] [facility]       9/21/2017 8:00 AM     Appointment with [address] at [place] [address] ([phone])   PREPS:  * Bring list of current medications * Please report to second floor, 2C (main elevators, turn right) 10 minutes prior to appointment ght)   [address] [address]       9/25/2017 8:00 AM     Appointment with [address] at [place] [address] ([phone])   PREPS:  * Bring list of current medications * Please report to second floor, 2C (main elevators, turn right) 10 minutes prior to appointment ght)   [address] [address]       9/29/2017 8:00 AM     Appointment with Erinn Patterson MD at [address] [place] [address] ([phone])   Please arrive 15 minutes prior to appointment, bring photo ID and insurance card. [address] [address]       10/23/2017 9:00 AM     Appointment with Darcie Fregoso MD at [address] [place] [address] ([phone])   Please arrive 15 minutes prior to appointment time, bring insurance card and photo ID.    [address] [facility] [address]  [facility] [address]       2/6/2018 8:45 AM     Appointment with Meagan Coley MD at [facility] ([phone])   Please arrive 15 minutes prior to appointment, bring photo ID and insurance card. Please arrive 15 minutes prior to appointment, bring photo ID and insurance card. [address] [address]         Preventive Care        Date Due    Hepatitis C screening is recommended for all adults regardless of risk factors born between [facility] at least once (lifetime) who have never been tested. 1965    HIV screening is recommended for all people regardless of risk factors  aged 15-65 years at least once (lifetime) who have never been HIV tested. 6/8/1979    Pneumococcal Vaccine - Pneumovax for adults aged 19-64 years with: chronic heart disease, chronic lung disease, diabetes mellitus, alcoholism, chronic liver disease, or cigarette smoking. (1 of 1 - PPSV23) 6/8/1983    Colonoscopy 6/8/2014    Eye Exam By An Eye Doctor 1/15/2016    Yearly Flu Vaccine (1) 9/1/2017    Mammograms are recommended every 2 years for low/average risk patients aged 48 - 69, and every year for high risk patients per updated national guidelines. However these guidelines can be individualized by your provider. 6/1/2018    Diabetic Foot Exam 8/9/2018    Hemoglobin A1C (Test For Long-Term Glucose Control) 8/9/2018    Urine Check For Kidney Problems 8/9/2018    Cholesterol Screening 8/11/2018    Tetanus Combination Vaccine (2 - Td) 12/31/2025                 Care Plan Once You Return Home    This section includes instructions you will need to follow once you leave the hospital.  Your care team will discuss these with you, so you and those caring for you know how to best care for your health needs at home. This section may also include educational information about certain health topics that may be of help to you. Discharge Instructions       IV DISCHARGE INSTRUCTIONS    ACTIVITY:  Continue usual care with your doctor. Call your doctor immediately if any severe problems or go to nearest emergency room. Keep I.V site clean and dry. Return to Outpatient [facility] at [facility]     On: [address] [phone]                      I have been treated and hereby acknowledge receiving this instruction sheet.                      Important information for a smoker SMOKING: QUIT SMOKING. THIS IS THE MOST IMPORTANT ACTION YOU CAN TAKE TO IMPROVE YOUR CURRENT AND FUTURE HEALTH. Call the [facility] [place] NOW ([phone])    Smoking harms nonsmokers. When nonsmokers are around people who smoke, they absorb nicotine, carbon monoxide, and other ingredients of tobacco smoke. DO NOT SMOKE AROUND CHILDREN     Children exposed to secondhand smoke are at an increased risk of:  Sudden Infant Death Syndrome (SIDS), acute respiratory infections, inflammation of the middle ear, and severe asthma. Over a longer time, it causes heart disease and lung cancer. There is no safe level of exposure to secondhand smoke. [employer] Signup     Our records indicate that you have an active [employer] account. You can view your After Visit Summary by going to [email or web address] and logging in with your [employer] username and password. If you don't have a [employer] username and password but a parent or guardian has access to your record, the parent or guardian should login with their own [employer] username and password and access your record to view the After Visit Summary. Additional Information  If you have questions, please contact the physician practice where you receive care. Remember, [employer] is NOT to be used for urgent needs. For medical emergencies, dial 911. For questions regarding your [employer] account call [phone]. If you have a clinical question, please call your doctor's office. View your information online  ? Review your current list of  medications, immunization, and allergies. ? Review your future test results online . ?  Review your discharge instructions provided by your caregivers at discharge    Certain functionality such as prescription refills, scheduling appointments or sending messages to your provider are not activated if

## 2017-09-12 ENCOUNTER — HOSPITAL ENCOUNTER (OUTPATIENT)
Dept: MRI IMAGING | Age: 52
Discharge: HOME | End: 2017-09-12
Payer: COMMERCIAL

## 2017-09-12 DIAGNOSIS — G62.9 NEUROPATHY: ICD-10-CM

## 2017-09-12 DIAGNOSIS — G89.29 CHRONIC BILATERAL LOW BACK PAIN, WITH SCIATICA PRESENCE UNSPECIFIED: ICD-10-CM

## 2017-09-12 DIAGNOSIS — G89.29 CHRONIC MIDLINE LOW BACK PAIN, WITH SCIATICA PRESENCE UNSPECIFIED: ICD-10-CM

## 2017-09-12 DIAGNOSIS — M54.5 CHRONIC BILATERAL LOW BACK PAIN, WITH SCIATICA PRESENCE UNSPECIFIED: ICD-10-CM

## 2017-09-12 DIAGNOSIS — G82.54 INCOMPLETE QUADRIPLEGIA AT C5-C8 LEVEL (HCC): ICD-10-CM

## 2017-09-12 DIAGNOSIS — M54.5 CHRONIC MIDLINE LOW BACK PAIN, WITH SCIATICA PRESENCE UNSPECIFIED: ICD-10-CM

## 2017-09-12 PROCEDURE — 72148 MRI LUMBAR SPINE W/O DYE: CPT

## 2017-09-13 ENCOUNTER — APPOINTMENT (OUTPATIENT)
Dept: PHYSICAL THERAPY | Age: 52
End: 2017-09-13
Payer: COMMERCIAL

## 2017-09-13 ENCOUNTER — HOSPITAL ENCOUNTER (OUTPATIENT)
Dept: PHYSICAL THERAPY | Age: 52
Setting detail: THERAPIES SERIES
End: 2017-09-13
Payer: COMMERCIAL

## 2017-09-14 ENCOUNTER — HOSPITAL ENCOUNTER (OUTPATIENT)
Dept: NURSING | Age: 52
Discharge: HOME | End: 2017-09-14
Payer: COMMERCIAL

## 2017-09-14 VITALS
DIASTOLIC BLOOD PRESSURE: 57 MMHG | OXYGEN SATURATION: 96 % | RESPIRATION RATE: 18 BRPM | HEART RATE: 76 BPM | TEMPERATURE: 97.2 F | SYSTOLIC BLOOD PRESSURE: 108 MMHG

## 2017-09-14 DIAGNOSIS — D50.9 IRON DEFICIENCY ANEMIA, UNSPECIFIED IRON DEFICIENCY ANEMIA TYPE: ICD-10-CM

## 2017-09-14 PROCEDURE — 2580000003 HC RX 258: Performed by: FAMILY MEDICINE

## 2017-09-14 PROCEDURE — 6360000002 HC RX W HCPCS: Performed by: FAMILY MEDICINE

## 2017-09-14 PROCEDURE — 96365 THER/PROPH/DIAG IV INF INIT: CPT

## 2017-09-14 RX ORDER — SODIUM CHLORIDE 0.9 % (FLUSH) 0.9 %
5 SYRINGE (ML) INJECTION PRN
Status: CANCELLED | OUTPATIENT
Start: 2017-09-14

## 2017-09-14 RX ORDER — DIPHENHYDRAMINE HYDROCHLORIDE 50 MG/ML
50 INJECTION INTRAMUSCULAR; INTRAVENOUS ONCE
Status: CANCELLED | OUTPATIENT
Start: 2017-09-14 | End: 2017-09-14

## 2017-09-14 RX ORDER — SODIUM CHLORIDE 0.9 % (FLUSH) 0.9 %
10 SYRINGE (ML) INJECTION PRN
Status: CANCELLED | OUTPATIENT
Start: 2017-09-14

## 2017-09-14 RX ORDER — 0.9 % SODIUM CHLORIDE 0.9 %
10 VIAL (ML) INJECTION ONCE
Status: CANCELLED | OUTPATIENT
Start: 2017-09-14 | End: 2017-09-14

## 2017-09-14 RX ORDER — METHYLPREDNISOLONE SODIUM SUCCINATE 125 MG/2ML
125 INJECTION, POWDER, LYOPHILIZED, FOR SOLUTION INTRAMUSCULAR; INTRAVENOUS ONCE
Status: CANCELLED | OUTPATIENT
Start: 2017-09-14 | End: 2017-09-14

## 2017-09-14 RX ORDER — HEPARIN SODIUM (PORCINE) LOCK FLUSH IV SOLN 100 UNIT/ML 100 UNIT/ML
500 SOLUTION INTRAVENOUS PRN
Status: CANCELLED | OUTPATIENT
Start: 2017-09-14

## 2017-09-14 RX ORDER — SODIUM CHLORIDE 0.9 % (FLUSH) 0.9 %
10 SYRINGE (ML) INJECTION PRN
Status: DISCONTINUED | OUTPATIENT
Start: 2017-09-14 | End: 2017-09-15 | Stop reason: HOSPADM

## 2017-09-14 RX ORDER — SODIUM CHLORIDE 9 MG/ML
INJECTION, SOLUTION INTRAVENOUS CONTINUOUS
Status: CANCELLED | OUTPATIENT
Start: 2017-09-14

## 2017-09-14 RX ADMIN — IRON SUCROSE 200 MG: 20 INJECTION, SOLUTION INTRAVENOUS at 08:29

## 2017-09-14 ASSESSMENT — PAIN SCALES - GENERAL: PAINLEVEL_OUTOF10: 8

## 2017-09-14 ASSESSMENT — PAIN DESCRIPTION - PAIN TYPE: TYPE: CHRONIC PAIN

## 2017-09-14 NOTE — PROGRESS NOTES
4304 Pt arrives ambulatory for iron infusion. Infusion explained to pt and questions answered. PT RIGHTS AND RESPONSIBILITIES OFFERED TO PT. Coffee provided to pt.  0935 Infusion complete. Pt tolerated it well with no complaints  0940 Pt discharged ambulatory with instructions with no complaints.      __m__ Safety:       (Environmental)   [place] to environment   Ensure ID band is correct and in place/ allergy band as needed   Assess for fall risk   Initiate fall precautions as applicable (fall band, side rails, etc.)   Call light within reach   Bed in low position/ wheels locked    _m___ Pain:        Assess pain level and characteristics   Administer analgesics as ordered   Assess effectiveness of pain management and report to MD as needed    _m___ Knowledge Deficit:   Assess baseline knowledge   Provide teaching at level of understanding   Provide teaching via preferred learning method   Evaluate teaching effectiveness    _m___ Hemodynamic/Respiratory Status:       (Pre and Post Procedure Monitoring)   Assess/Monitor vital signs and LOC   Assess Baseline SpO2 prior to any sedation   Obtain weight/height   Assess vital signs/ LOC until patient meets discharge criteria   Monitor procedure site and notify MD of any issues

## 2017-09-14 NOTE — IP AVS SNAPSHOT
Patient Information     Patient Name DEBBIE Farr 1965         This is your updated medication list to keep with you all times      ASK your doctor about these medications     amitriptyline 50 MG tablet   Commonly known as:  ELAVIL   Take 1 tablet by mouth nightly       CYMBALTA 60 MG extended release capsule   Generic drug:  DULoxetine       docusate sodium 100 MG capsule   Commonly known as:  COLACE       ferrous sulfate 325 (65 Fe) MG EC tablet   Commonly known as:  FE TABS   Take 1 tablet by mouth 2 times daily       gabapentin 400 MG capsule   Commonly known as:  NEURONTIN       HAIR SKIN AND NAILS FORMULA PO       HYDROcodone-acetaminophen 7.5-325 MG per tablet   Commonly known as:  NORCO   Take 1 tablet by mouth 2 times daily as needed for Pain .       iron sucrose 20 MG/ML injection   Commonly known as:  VENOFER   Infuse 10 mLs intravenously every 3 days for 5 doses Infuse slowly over at least 5 minutes.        metFORMIN 500 MG tablet   Commonly known as:  GLUCOPHAGE   Take 1 tablet by mouth 2 times daily (with meals)       mirtazapine 15 MG tablet   Commonly known as:  REMERON   Take 1 tablet by mouth nightly       traMADol 50 MG tablet   Commonly known as:  ULTRAM   1-2 tabs BID PRN       vitamin B-12 500 MCG tablet   Commonly known as:  CYANOCOBALAMIN       vitamin D 2000 units Caps capsule       vitamin D 73721 units Caps capsule   Commonly known as:  ERGOCALCIFEROL   Take 1 capsule by mouth once a week for 12 doses

## 2017-09-14 NOTE — IP AVS SNAPSHOT
After Visit Summary  (Discharge Instructions)    Medication List for Home    Based on the information you provided to us as well as any changes during this visit, the following is your updated medication list.  Compare this with your prescription bottles at home. If you have any questions or concerns, contact your primary care physician's office. Daily Medication List (This medication list can be shared with any healthcare provider who is helping you manage your medications)      ASK your doctor about these medications if you have questions        Last Dose    Next Dose Due AM NOON PM NIGHT    amitriptyline 50 MG tablet   Commonly known as:  ELAVIL   Take 1 tablet by mouth nightly                                         CYMBALTA 60 MG extended release capsule   Generic drug:  DULoxetine   Take 60 mg by mouth daily                                         docusate sodium 100 MG capsule   Commonly known as:  COLACE   Take 100 mg by mouth as needed for Constipation                                         ferrous sulfate 325 (65 Fe) MG EC tablet   Commonly known as:  FE TABS   Take 1 tablet by mouth 2 times daily                                         gabapentin 400 MG capsule   Commonly known as:  NEURONTIN   Take 400 mg by mouth daily                                         HAIR SKIN AND NAILS FORMULA PO   Take by mouth                                         HYDROcodone-acetaminophen 7.5-325 MG per tablet   Commonly known as:  NORCO   Take 1 tablet by mouth 2 times daily as needed for Pain .                                         iron sucrose 20 MG/ML injection   Commonly known as:  VENOFER   Infuse 10 mLs intravenously every 3 days for 5 doses Infuse slowly over at least 5 minutes.                 200 mg on 9/14/2017  8:29 AM                            metFORMIN 500 MG tablet   Commonly known as:  GLUCOPHAGE   Take 1 tablet by mouth 2 times daily (with meals) mirtazapine 15 MG tablet   Commonly known as:  REMERON   Take 1 tablet by mouth nightly                                         traMADol 50 MG tablet   Commonly known as:  ULTRAM   1-2 tabs BID PRN                                         vitamin B-12 500 MCG tablet   Commonly known as:  CYANOCOBALAMIN   Take 500 mcg by mouth daily                                         vitamin D 2000 units Caps capsule   Take by mouth daily                                         vitamin D 34681 units Caps capsule   Commonly known as:  ERGOCALCIFEROL   Take 1 capsule by mouth once a week for 12 doses                                                 Allergies as of 2017        Reactions    Ibuprofen     Unable to take due to gastric bypass      Immunizations as of 2017     Name Date Dose VIS Date Route    Influenza Virus Vaccine 2015 0.5 mL 2014 Intramuscular    Tdap (Boostrix, Adacel) 2015 0.5 mL 2015 Intramuscular      Last Vitals          Most Recent Value    Temp  97.2 °F (36.2 °C)    Pulse  72    Resp  18    BP  137/75         After Visit Summary    This summary was created for you. Thank you for entrusting your care to us. The following information includes details about your hospital/visit stay along with steps you should take to help with your recovery once you leave the hospital.  In this packet, you will find information about the topics listed below:    · Instructions about your medications including a list of your home medications  · A summary of your hospital visit  · Follow-up appointments once you have left the hospital  · Your care plan at home      You may receive a survey regarding the care you received during your stay. Your input is valuable to us. We encourage you to complete and return your survey in the envelope provided. We hope you will choose us in the future for your healthcare needs.           Patient Information     Patient Name DEBBIE Robertson [facility] 1965 Care Provided at:     Name Address Phone       [address] [phone]            Your Visit    Here you will find information about your visit, including the reason for your visit. Please take this sheet with you when you visit your doctor or other health care provider in the future. It will help determine the best possible medical care for you at that time. If you have any questions once you leave the hospital, please call the department phone number listed below. Why you were here     Your primary diagnosis was:  Not on File      Visit Information     Date & Time Department Dept. Phone    9/14/2017 [place] [address] [phone]       Follow-up Appointments    Below is a list of your follow-up and future appointments. This may not be a complete list as you may have made appointments directly with providers that we are not aware of or your providers may have made some for you. Please call your providers to confirm appointments. It is important to keep your appointments. Please bring your current insurance card, photo ID, co-pay, and all medication bottles to your appointment. If self-pay, payment is expected at the time of service.         Future Appointments     9/18/2017 8:00 AM     Appointment with STR EXAM ROOM 5 at [place] [address] ([phone])   PREPS:  * Bring list of current medications * Please report to second floor, 2C (main elevators, turn right) 10 minutes prior to appointment ght)   [address] [address]       9/21/2017 8:00 AM     Appointment with [address] at [place] [address] ([phone])   PREPS:  * Bring list of current medications * Please report to second floor, 2C (main elevators, turn right) 10 minutes prior to appointment ght)   [address] [address]       9/25/2017 8:00 AM     Appointment with [address] at [place] [address] ([phone]) PREPS:  * Bring list of current medications * Please report to second floor, 2C (main elevators, turn right) 10 minutes prior to appointment ght)   [address] [address]       9/29/2017 8:00 AM     Appointment with Kami Linares MD at [address] [facility] ([phone])   Please arrive 15 minutes prior to appointment, bring photo ID and insurance card. [address] [facility] [address]  [facility] [address]       10/2/2017 8:00 AM     Appointment with Marcela Myers PT at [address] [place] [address] & [facility] [address] ([phone])   [address] [place] [address]       10/4/2017 8:00 AM     Appointment with Laveda Salts at [address] [place] [address] & [facility] [address] ([phone])   [address] [facility]       10/9/2017 8:00 AM     Appointment with Laveda Salts at [address] [place] [address] & [facility] [address] ([phone])   [address] [address]       10/11/2017 8:00 AM     Appointment with Marcela Myers PT at [address] [place] [address] & [facility] [address] ([phone])   [address] [place] [address]       10/16/2017 8:00 AM     Appointment with Laveda Salts at [address] [place] [address] & [facility] [address] ([phone])   [address] [address]       10/18/2017 8:00 AM     Appointment with Laveda Salts at [address] [place] [address] & [facility] [address] ([phone])   [address] [address]       10/23/2017 8:00 AM     Appointment with Laveda Salts at [address] [place] [address] & [facility] [address] ([phone])   [address]  [place] [address]       10/23/2017 9:00 AM     Appointment with Torres Bolaños MD at [address] [facility] ([phone])   Please arrive 15 minutes prior to appointment time, bring insurance card and photo ID.    [address] [facility] [address]  [facility] [address]       10/25/2017 8:00 AM     Appointment with Marcela Myers PT at [address] [place] [address] & [facility] [address] ([phone])   [facility] [address]  [address] [facility] [address]       2/6/2018 8:45 AM     Appointment with Primo Dixon MD at [facility] ([phone])   Please arrive 15 minutes prior to appointment, bring photo ID and insurance card. Please arrive 15 minutes prior to appointment, bring photo ID and insurance card. [address]  [facility] [address]         Preventive Care        Date Due    Hepatitis C screening is recommended for all adults regardless of risk factors born between [facility] at least once (lifetime) who have never been tested. 1965    HIV screening is recommended for all people regardless of risk factors  aged 15-65 years at least once (lifetime) who have never been HIV tested. 6/8/1979    Pneumococcal Vaccine - Pneumovax for adults aged 19-64 years with: chronic heart disease, chronic lung disease, diabetes mellitus, alcoholism, chronic liver disease, or cigarette smoking. (1 of 1 - PPSV23) 6/8/1983    Colonoscopy 6/8/2014    Eye Exam By An Eye Doctor 1/15/2016    Yearly Flu Vaccine (1) 9/1/2017    Mammograms are recommended every 2 years for low/average risk patients aged 48 - 69, and every year for high risk patients per updated national guidelines. However these guidelines can be individualized by your provider. 6/1/2018    Diabetic Foot Exam 8/9/2018    Hemoglobin A1C (Test For Long-Term Glucose Control) 8/9/2018    Urine Check For Kidney Problems 8/9/2018    Cholesterol Screening 8/11/2018    Tetanus Combination Vaccine (2 - Td) 12/31/2025                 Care Plan Once You Return Home    This section includes instructions you will need to follow once you leave the hospital.  Your care team will discuss these with you, so you and those caring for you know how to best care for your health needs at home. This section may also include educational information about certain health topics that may be of help to you. Discharge Instructions       ACTIVITY:  Continue usual care with your doctor. Call your doctor immediately if any severe problems or go to the nearest emergency room. I have been treated and hereby acknowledge receiving this instruction sheet. Important information for a smoker       SMOKING: QUIT SMOKING. THIS IS THE MOST IMPORTANT ACTION YOU CAN TAKE TO IMPROVE YOUR CURRENT AND FUTURE HEALTH. Call the [facility] ([phone])    Smoking harms nonsmokers. When nonsmokers are around people who smoke, they absorb nicotine, carbon monoxide, and other ingredients of tobacco smoke. DO NOT SMOKE AROUND CHILDREN     Children exposed to secondhand smoke are at an increased risk of:  Sudden Infant Death Syndrome (SIDS), acute respiratory infections, inflammation of the middle ear, and severe asthma. Over a longer time, it causes heart disease and lung cancer. There is no safe level of exposure to secondhand smoke. [employer] Signup     Our records indicate that you have an active [employer] account. You can view your After Visit Summary by going to [email or web address] and logging in with your [employer] username and password. If you don't have a [employer] username and password but a parent or guardian has access to your record, the parent or guardian should login with their own [employer] username and password and access your record to view the After Visit Summary. Additional Information  If you have questions, please contact the physician practice where you receive care. Remember, [employer] is NOT to be used for urgent needs. For medical emergencies, dial 911. For questions regarding your [employer] account call [phone]. If you have a clinical question, please call your doctor's office. View your information online  ? Review your current list of  medications, immunization, and allergies. ? Review your future test results online . ?  Review your discharge instructions provided by your caregivers at discharge Certain functionality such as prescription refills, scheduling appointments or sending messages to your provider are not activated if your provider does not use [facility] in his/her office    For questions regarding your MyChart account call [phone]. If you have a clinical question, please call your doctor's office. The information on all pages of the After Visit Summary has been reviewed with me, the patient and/or responsible adult, by my health care provider(s). I had the opportunity to ask questions regarding this information. I understand I should dispose of my armband safely at home to protect my health information. A complete copy of the After Visit Summary has been given to me, the patient and/or responsible adult.            Patient Signature/Responsible Adult:____________________    Clinician Signature:_____________________    Date:_____________________    Time:_____________________

## 2017-09-18 ENCOUNTER — HOSPITAL ENCOUNTER (OUTPATIENT)
Dept: NURSING | Age: 52
Discharge: HOME | End: 2017-09-18
Payer: COMMERCIAL

## 2017-09-18 VITALS
TEMPERATURE: 98.1 F | HEART RATE: 75 BPM | DIASTOLIC BLOOD PRESSURE: 55 MMHG | OXYGEN SATURATION: 96 % | SYSTOLIC BLOOD PRESSURE: 92 MMHG | RESPIRATION RATE: 18 BRPM

## 2017-09-18 DIAGNOSIS — M79.605 PAIN IN BOTH LOWER EXTREMITIES: ICD-10-CM

## 2017-09-18 DIAGNOSIS — G82.54 INCOMPLETE QUADRIPLEGIA AT C5-C8 LEVEL (HCC): ICD-10-CM

## 2017-09-18 DIAGNOSIS — G89.29 CHRONIC MIDLINE LOW BACK PAIN, WITH SCIATICA PRESENCE UNSPECIFIED: ICD-10-CM

## 2017-09-18 DIAGNOSIS — M25.532 LEFT WRIST PAIN: ICD-10-CM

## 2017-09-18 DIAGNOSIS — D50.9 IRON DEFICIENCY ANEMIA, UNSPECIFIED IRON DEFICIENCY ANEMIA TYPE: ICD-10-CM

## 2017-09-18 DIAGNOSIS — T79.2XXD TRAUMATIC SEROMA OF LEFT THIGH, SUBSEQUENT ENCOUNTER: ICD-10-CM

## 2017-09-18 DIAGNOSIS — S70.02XS HEMATOMA OF LEFT HIP, SEQUELA: ICD-10-CM

## 2017-09-18 DIAGNOSIS — M54.5 CHRONIC MIDLINE LOW BACK PAIN, WITH SCIATICA PRESENCE UNSPECIFIED: ICD-10-CM

## 2017-09-18 DIAGNOSIS — G62.9 NEUROPATHY: ICD-10-CM

## 2017-09-18 DIAGNOSIS — M79.604 PAIN IN BOTH LOWER EXTREMITIES: ICD-10-CM

## 2017-09-18 PROCEDURE — 2580000003 HC RX 258: Performed by: FAMILY MEDICINE

## 2017-09-18 PROCEDURE — 6360000002 HC RX W HCPCS: Performed by: FAMILY MEDICINE

## 2017-09-18 PROCEDURE — 96365 THER/PROPH/DIAG IV INF INIT: CPT

## 2017-09-18 RX ORDER — GABAPENTIN 400 MG/1
400 CAPSULE ORAL 2 TIMES DAILY
Qty: 60 CAPSULE | Refills: 0 | Status: SHIPPED | OUTPATIENT
Start: 2017-09-18 | End: 2017-10-16 | Stop reason: SDUPTHER

## 2017-09-18 RX ORDER — DIPHENHYDRAMINE HYDROCHLORIDE 50 MG/ML
50 INJECTION INTRAMUSCULAR; INTRAVENOUS ONCE
Status: CANCELLED | OUTPATIENT
Start: 2017-09-18 | End: 2017-09-18

## 2017-09-18 RX ORDER — SODIUM CHLORIDE 9 MG/ML
INJECTION, SOLUTION INTRAVENOUS CONTINUOUS
Status: CANCELLED | OUTPATIENT
Start: 2017-09-18

## 2017-09-18 RX ORDER — SODIUM CHLORIDE 0.9 % (FLUSH) 0.9 %
10 SYRINGE (ML) INJECTION PRN
Status: CANCELLED | OUTPATIENT
Start: 2017-09-18

## 2017-09-18 RX ORDER — METHYLPREDNISOLONE SODIUM SUCCINATE 125 MG/2ML
125 INJECTION, POWDER, LYOPHILIZED, FOR SOLUTION INTRAMUSCULAR; INTRAVENOUS ONCE
Status: CANCELLED | OUTPATIENT
Start: 2017-09-18 | End: 2017-09-18

## 2017-09-18 RX ORDER — AMITRIPTYLINE HYDROCHLORIDE 50 MG/1
50 TABLET, FILM COATED ORAL NIGHTLY
Qty: 30 TABLET | Refills: 1 | Status: SHIPPED | OUTPATIENT
Start: 2017-09-18 | End: 2017-10-16 | Stop reason: SDUPTHER

## 2017-09-18 RX ORDER — SODIUM CHLORIDE 0.9 % (FLUSH) 0.9 %
5 SYRINGE (ML) INJECTION PRN
Status: CANCELLED | OUTPATIENT
Start: 2017-09-18

## 2017-09-18 RX ORDER — 0.9 % SODIUM CHLORIDE 0.9 %
10 VIAL (ML) INJECTION ONCE
Status: CANCELLED | OUTPATIENT
Start: 2017-09-18 | End: 2017-09-18

## 2017-09-18 RX ORDER — HEPARIN SODIUM (PORCINE) LOCK FLUSH IV SOLN 100 UNIT/ML 100 UNIT/ML
500 SOLUTION INTRAVENOUS PRN
Status: CANCELLED | OUTPATIENT
Start: 2017-09-18

## 2017-09-18 RX ADMIN — IRON SUCROSE 200 MG: 20 INJECTION, SOLUTION INTRAVENOUS at 08:19

## 2017-09-18 ASSESSMENT — PAIN DESCRIPTION - LOCATION: LOCATION: HAND;LEG

## 2017-09-18 ASSESSMENT — PAIN DESCRIPTION - PAIN TYPE: TYPE: CHRONIC PAIN

## 2017-09-18 ASSESSMENT — PAIN SCALES - GENERAL: PAINLEVEL_OUTOF10: 8

## 2017-09-18 NOTE — IP AVS SNAPSHOT
Patient Information     Patient Name DEBBIE Queen 1965         This is your updated medication list to keep with you all times      ASK your doctor about these medications     amitriptyline 50 MG tablet   Commonly known as:  ELAVIL   Take 1 tablet by mouth nightly       CYMBALTA 60 MG extended release capsule   Generic drug:  DULoxetine       docusate sodium 100 MG capsule   Commonly known as:  COLACE       ferrous sulfate 325 (65 Fe) MG EC tablet   Commonly known as:  FE TABS   Take 1 tablet by mouth 2 times daily       gabapentin 400 MG capsule   Commonly known as:  NEURONTIN       HAIR SKIN AND NAILS FORMULA PO       HYDROcodone-acetaminophen 7.5-325 MG per tablet   Commonly known as:  NORCO   Take 1 tablet by mouth 2 times daily as needed for Pain .       iron sucrose 20 MG/ML injection   Commonly known as:  VENOFER   Infuse 10 mLs intravenously every 3 days for 5 doses Infuse slowly over at least 5 minutes.        metFORMIN 500 MG tablet   Commonly known as:  GLUCOPHAGE   Take 1 tablet by mouth 2 times daily (with meals)       mirtazapine 15 MG tablet   Commonly known as:  REMERON   Take 1 tablet by mouth nightly       traMADol 50 MG tablet   Commonly known as:  ULTRAM   1-2 tabs BID PRN       vitamin B-12 500 MCG tablet   Commonly known as:  CYANOCOBALAMIN       vitamin D 2000 units Caps capsule       vitamin D 51694 units Caps capsule   Commonly known as:  ERGOCALCIFEROL   Take 1 capsule by mouth once a week for 12 doses

## 2017-09-18 NOTE — PROGRESS NOTES
0810 Pt arrives ambulatory for iron infusion. Infusion explained to pt and questions answered. PT RIGHTS AND RESPONSIBILITIES OFFERED TO PT.  OJ provided to pt.     _m___ Safety:       (Environmental)   [place] to environment   Ensure ID band is correct and in place/ allergy band as needed   Assess for fall risk   Initiate fall precautions as applicable (fall band, side rails, etc.)   Call light within reach   Bed in low position/ wheels locked    _m___ Pain:        Assess pain level and characteristics   Administer analgesics as ordered   Assess effectiveness of pain management and report to MD as needed    _m___ Knowledge Deficit:   Assess baseline knowledge   Provide teaching at level of understanding   Provide teaching via preferred learning method   Evaluate teaching effectiveness    _m___ Hemodynamic/Respiratory Status:       (Pre and Post Procedure Monitoring)   Assess/Monitor vital signs and LOC   Assess Baseline SpO2 prior to any sedation   Obtain weight/height   Assess vital signs/ LOC until patient meets discharge criteria   Monitor procedure site and notify MD of any issues

## 2017-09-18 NOTE — IP AVS SNAPSHOT
After Visit Summary  (Discharge Instructions)    Medication List for Home    Based on the information you provided to us as well as any changes during this visit, the following is your updated medication list.  Compare this with your prescription bottles at home. If you have any questions or concerns, contact your primary care physician's office. Daily Medication List (This medication list can be shared with any healthcare provider who is helping you manage your medications)      ASK your doctor about these medications if you have questions        Last Dose    Next Dose Due AM NOON PM NIGHT    amitriptyline 50 MG tablet   Commonly known as:  ELAVIL   Take 1 tablet by mouth nightly                                         CYMBALTA 60 MG extended release capsule   Generic drug:  DULoxetine   Take 60 mg by mouth daily                                         docusate sodium 100 MG capsule   Commonly known as:  COLACE   Take 100 mg by mouth as needed for Constipation                                         ferrous sulfate 325 (65 Fe) MG EC tablet   Commonly known as:  FE TABS   Take 1 tablet by mouth 2 times daily                                         gabapentin 400 MG capsule   Commonly known as:  NEURONTIN   Take 400 mg by mouth daily                                         HAIR SKIN AND NAILS FORMULA PO   Take by mouth                                         HYDROcodone-acetaminophen 7.5-325 MG per tablet   Commonly known as:  NORCO   Take 1 tablet by mouth 2 times daily as needed for Pain .                                         iron sucrose 20 MG/ML injection   Commonly known as:  VENOFER   Infuse 10 mLs intravenously every 3 days for 5 doses Infuse slowly over at least 5 minutes.                 200 mg on 9/18/2017  8:19 AM                            metFORMIN 500 MG tablet   Commonly known as:  GLUCOPHAGE   Take 1 tablet by mouth 2 times daily (with meals) mirtazapine 15 MG tablet   Commonly known as:  REMERON   Take 1 tablet by mouth nightly                                         traMADol 50 MG tablet   Commonly known as:  ULTRAM   1-2 tabs BID PRN                                         vitamin B-12 500 MCG tablet   Commonly known as:  CYANOCOBALAMIN   Take 500 mcg by mouth daily                                         vitamin D 2000 units Caps capsule   Take by mouth daily                                         vitamin D 90526 units Caps capsule   Commonly known as:  ERGOCALCIFEROL   Take 1 capsule by mouth once a week for 12 doses                                                 Allergies as of 2017        Reactions    Ibuprofen     Unable to take due to gastric bypass      Immunizations as of 2017     Name Date Dose VIS Date Route    Influenza Virus Vaccine 2015 0.5 mL 2014 Intramuscular    Tdap (Boostrix, Adacel) 2015 0.5 mL 2015 Intramuscular      Last Vitals          Most Recent Value    Temp  98.1 °F (36.7 °C)    Pulse  78    Resp  18    BP  118/69         After Visit Summary    This summary was created for you. Thank you for entrusting your care to us. The following information includes details about your hospital/visit stay along with steps you should take to help with your recovery once you leave the hospital.  In this packet, you will find information about the topics listed below:    · Instructions about your medications including a list of your home medications  · A summary of your hospital visit  · Follow-up appointments once you have left the hospital  · Your care plan at home      You may receive a survey regarding the care you received during your stay. Your input is valuable to us. We encourage you to complete and return your survey in the envelope provided. We hope you will choose us in the future for your healthcare needs.           Patient Information     Patient Name DEBBIE Felix 1965 Care Provided at:     Name Address Phone       [address] [phone]            Your Visit    Here you will find information about your visit, including the reason for your visit. Please take this sheet with you when you visit your doctor or other health care provider in the future. It will help determine the best possible medical care for you at that time. If you have any questions once you leave the hospital, please call the department phone number listed below. Why you were here     Your primary diagnosis was:  Not on File      Visit Information     Date & Time Department Dept. Phone    9/18/2017 [place] [address] [phone]       Follow-up Appointments    Below is a list of your follow-up and future appointments. This may not be a complete list as you may have made appointments directly with providers that we are not aware of or your providers may have made some for you. Please call your providers to confirm appointments. It is important to keep your appointments. Please bring your current insurance card, photo ID, co-pay, and all medication bottles to your appointment. If self-pay, payment is expected at the time of service.         Future Appointments     9/21/2017 8:00 AM     Appointment with [facility] EXAM ROOM 5 at [place] [address] ([phone])   PREPS:  * Bring list of current medications * Please report to second floor, 2C (main elevators, turn right) 10 minutes prior to appointment ght)   [address] [address]       9/25/2017 8:00 AM     Appointment with [address] at [place] [address] ([phone])   PREPS:  * Bring list of current medications * Please report to second floor, 2C (main elevators, turn right) 10 minutes prior to appointment ght)   [address] [address]       9/29/2017 8:00 AM     Appointment with Jagdish Clarke MD at [address] ([phone]) Please arrive 15 minutes prior to appointment, bring photo ID and insurance card. [address] [facility] [address]  [facility] [address]       10/2/2017 8:00 AM     Appointment with Donnell Gomez PT at [address] [place] [address] & [facility] [address] ([phone])   [address] [facility]       10/4/2017 8:00 AM     Appointment with Sana Massiel at [address] [place] [address] & [facility] [address] ([phone])   [address] [facility]       10/9/2017 8:00 AM     Appointment with Sana Massiel at [address] [place] [address] & [facility] [address] ([phone])   [address] [address]       10/11/2017 8:00 AM     Appointment with Donnell Gomez PT at [address] [place] [address] & [facility] [address] ([phone])   [address] [place] [address]       10/16/2017 8:00 AM     Appointment with Sana Massiel at [address] [place] [address] & [facility] [address] ([phone])   [address] [facility] [address]       10/18/2017 8:00 AM     Appointment with Sana Massiel at [address] [place] [address] & [facility] [address] ([phone])   [address] [address]       10/23/2017 8:00 AM     Appointment with Sana Massiel at [address] [place] [address] & [facility] [address] ([phone])   [address] [facility] [address]  [address] [address]       10/23/2017 9:00 AM     Appointment with Bety Strong MD at [address] ([phone])   Please arrive 15 minutes prior to appointment time, bring insurance card and photo ID.    [address] [facility] [address]  [facility] [address]       10/25/2017 8:00 AM     Appointment with Donnell Gomez PT at [address] [place] [address] & [facility] [address] ([phone])   [facility] [address]  [place] [address]       2/6/2018 8:45 AM     Appointment with Jaimie Bob MD at [facility] ([phone])   Please arrive 15 minutes prior to appointment, bring photo ID and insurance card. Please arrive 15 minutes prior to appointment, bring photo ID and insurance card. [address]         Preventive Care        Date Due Hepatitis C screening is recommended for all adults regardless of risk factors born between [facility] at least once (lifetime) who have never been tested. 1965    HIV screening is recommended for all people regardless of risk factors  aged 15-65 years at least once (lifetime) who have never been HIV tested. 6/8/1979    Pneumococcal Vaccine - Pneumovax for adults aged 19-64 years with: chronic heart disease, chronic lung disease, diabetes mellitus, alcoholism, chronic liver disease, or cigarette smoking. (1 of 1 - PPSV23) 6/8/1983    Colonoscopy 6/8/2014    Eye Exam By An Eye Doctor 1/15/2016    Yearly Flu Vaccine (1) 9/1/2017    Mammograms are recommended every 2 years for low/average risk patients aged 48 - 69, and every year for high risk patients per updated national guidelines. However these guidelines can be individualized by your provider. 6/1/2018    Diabetic Foot Exam 8/9/2018    Hemoglobin A1C (Test For Long-Term Glucose Control) 8/9/2018    Urine Check For Kidney Problems 8/9/2018    Cholesterol Screening 8/11/2018    Tetanus Combination Vaccine (2 - Td) 12/31/2025                 Care Plan Once You Return Home    This section includes instructions you will need to follow once you leave the hospital.  Your care team will discuss these with you, so you and those caring for you know how to best care for your health needs at home. This section may also include educational information about certain health topics that may be of help to you. Discharge Instructions       ACTIVITY:  Continue usual care with your doctor. Call your doctor immediately if any severe problems or go to the nearest emergency room. I have been treated and hereby acknowledge receiving this instruction sheet. Important information for a smoker       SMOKING: QUIT SMOKING. THIS IS THE MOST IMPORTANT ACTION YOU CAN TAKE TO IMPROVE YOUR CURRENT AND FUTURE HEALTH. Call the [address] [place] [address] at [place] NOW ([phone])    Smoking harms nonsmokers. When nonsmokers are around people who smoke, they absorb nicotine, carbon monoxide, and other ingredients of tobacco smoke. DO NOT SMOKE AROUND CHILDREN     Children exposed to secondhand smoke are at an increased risk of:  Sudden Infant Death Syndrome (SIDS), acute respiratory infections, inflammation of the middle ear, and severe asthma. Over a longer time, it causes heart disease and lung cancer. There is no safe level of exposure to secondhand smoke. [employer] Signup     Our records indicate that you have an active [employer]ation [employer] Service account. You can view your After Visit Summary by going to [email or web address] and logging in with your [employer] username and password. If you don't have a [employer] username and password but a parent or guardian has access to your record, the parent or guardian should login with their own [employer] username and password and access your record to view the After Visit Summary. Additional Information  If you have questions, please contact the physician practice where you receive care. Remember, [employer] is NOT to be used for urgent needs. For medical emergencies, dial 911. For questions regarding your [employer] account call [phone]. If you have a clinical question, please call your doctor's office. View your information online  ? Review your current list of  medications, immunization, and allergies. ? Review your future test results online . ? Review your discharge instructions provided by your caregivers at discharge    Certain functionality such as prescription refills, scheduling appointments or sending messages to your provider are not activated if your provider does not use [employer] in his/her office    For questions regarding your Disc[employer]t account call [phone].  If you

## 2017-09-18 NOTE — PROGRESS NOTES
9:43 AM IV COMPLETED-PT TOLERATED IT WELL  9:43 AM WRITTEN DISCHARGE INSTRUCTIONS GIVEN TO PT-VERBALIZES UNDERSTANDING  9:43 AM PT DISCHARGED AMBULATORY IN SATISFACTORY CONDITION

## 2017-09-20 ENCOUNTER — APPOINTMENT (OUTPATIENT)
Dept: PHYSICAL THERAPY | Age: 52
End: 2017-09-20
Payer: COMMERCIAL

## 2017-09-21 ENCOUNTER — HOSPITAL ENCOUNTER (OUTPATIENT)
Dept: NURSING | Age: 52
Discharge: HOME | End: 2017-09-21
Payer: COMMERCIAL

## 2017-09-21 VITALS
HEART RATE: 80 BPM | RESPIRATION RATE: 16 BRPM | SYSTOLIC BLOOD PRESSURE: 109 MMHG | DIASTOLIC BLOOD PRESSURE: 56 MMHG | TEMPERATURE: 96.8 F

## 2017-09-21 DIAGNOSIS — D50.9 IRON DEFICIENCY ANEMIA, UNSPECIFIED IRON DEFICIENCY ANEMIA TYPE: ICD-10-CM

## 2017-09-21 PROCEDURE — 2580000003 HC RX 258: Performed by: FAMILY MEDICINE

## 2017-09-21 PROCEDURE — 6360000002 HC RX W HCPCS: Performed by: FAMILY MEDICINE

## 2017-09-21 PROCEDURE — 96365 THER/PROPH/DIAG IV INF INIT: CPT

## 2017-09-21 PROCEDURE — 96367 TX/PROPH/DG ADDL SEQ IV INF: CPT

## 2017-09-21 RX ORDER — SODIUM CHLORIDE 9 MG/ML
INJECTION, SOLUTION INTRAVENOUS CONTINUOUS
Status: CANCELLED | OUTPATIENT
Start: 2017-09-21

## 2017-09-21 RX ORDER — 0.9 % SODIUM CHLORIDE 0.9 %
10 VIAL (ML) INJECTION ONCE
Status: CANCELLED | OUTPATIENT
Start: 2017-09-21 | End: 2017-09-21

## 2017-09-21 RX ORDER — SODIUM CHLORIDE 0.9 % (FLUSH) 0.9 %
10 SYRINGE (ML) INJECTION PRN
Status: CANCELLED | OUTPATIENT
Start: 2017-09-21

## 2017-09-21 RX ORDER — DIPHENHYDRAMINE HYDROCHLORIDE 50 MG/ML
50 INJECTION INTRAMUSCULAR; INTRAVENOUS ONCE
Status: CANCELLED | OUTPATIENT
Start: 2017-09-21 | End: 2017-09-21

## 2017-09-21 RX ORDER — SODIUM CHLORIDE 0.9 % (FLUSH) 0.9 %
5 SYRINGE (ML) INJECTION PRN
Status: CANCELLED | OUTPATIENT
Start: 2017-09-21

## 2017-09-21 RX ORDER — HEPARIN SODIUM (PORCINE) LOCK FLUSH IV SOLN 100 UNIT/ML 100 UNIT/ML
500 SOLUTION INTRAVENOUS PRN
Status: CANCELLED | OUTPATIENT
Start: 2017-09-21

## 2017-09-21 RX ORDER — SODIUM CHLORIDE 0.9 % (FLUSH) 0.9 %
10 SYRINGE (ML) INJECTION PRN
Status: DISCONTINUED | OUTPATIENT
Start: 2017-09-21 | End: 2017-09-22 | Stop reason: HOSPADM

## 2017-09-21 RX ORDER — METHYLPREDNISOLONE SODIUM SUCCINATE 125 MG/2ML
125 INJECTION, POWDER, LYOPHILIZED, FOR SOLUTION INTRAMUSCULAR; INTRAVENOUS ONCE
Status: CANCELLED | OUTPATIENT
Start: 2017-09-21 | End: 2017-09-21

## 2017-09-21 RX ADMIN — Medication 10 ML: at 08:31

## 2017-09-21 RX ADMIN — IRON SUCROSE 200 MG: 20 INJECTION, SOLUTION INTRAVENOUS at 08:31

## 2017-09-21 ASSESSMENT — PAIN DESCRIPTION - DESCRIPTORS: DESCRIPTORS: BURNING;CONSTANT

## 2017-09-21 ASSESSMENT — PAIN - FUNCTIONAL ASSESSMENT: PAIN_FUNCTIONAL_ASSESSMENT: 0-10

## 2017-09-21 NOTE — IP AVS SNAPSHOT
After Visit Summary  (Discharge Instructions)    Medication List for Home    Based on the information you provided to us as well as any changes during this visit, the following is your updated medication list.  Compare this with your prescription bottles at home. If you have any questions or concerns, contact your primary care physician's office. Daily Medication List (This medication list can be shared with any healthcare provider who is helping you manage your medications)      ASK your doctor about these medications if you have questions        Last Dose    Next Dose Due AM NOON PM NIGHT    amitriptyline 50 MG tablet   Commonly known as:  ELAVIL   Take 1 tablet by mouth nightly                                         CYMBALTA 60 MG extended release capsule   Generic drug:  DULoxetine   Take 60 mg by mouth daily                                         docusate sodium 100 MG capsule   Commonly known as:  COLACE   Take 100 mg by mouth as needed for Constipation                                         ferrous sulfate 325 (65 Fe) MG EC tablet   Commonly known as:  FE TABS   Take 1 tablet by mouth 2 times daily                                         gabapentin 400 MG capsule   Commonly known as:  NEURONTIN   Take 1 capsule by mouth 2 times daily                                         HAIR SKIN AND NAILS FORMULA PO   Take by mouth                                         HYDROcodone-acetaminophen 7.5-325 MG per tablet   Commonly known as:  NORCO   Take 1 tablet by mouth 2 times daily as needed for Pain .                                         iron sucrose 20 MG/ML injection   Commonly known as:  VENOFER   Infuse 10 mLs intravenously every 3 days for 5 doses Infuse slowly over at least 5 minutes.                 200 mg on 9/21/2017  8:31 AM                            metFORMIN 500 MG tablet   Commonly known as:  GLUCOPHAGE   Take 1 tablet by mouth 2 times daily (with meals) mirtazapine 15 MG tablet   Commonly known as:  REMERON   Take 1 tablet by mouth nightly                                         traMADol 50 MG tablet   Commonly known as:  ULTRAM   1-2 tabs BID PRN                                         vitamin B-12 500 MCG tablet   Commonly known as:  CYANOCOBALAMIN   Take 500 mcg by mouth daily                                         vitamin D 2000 units Caps capsule   Take by mouth daily                                         vitamin D 49225 units Caps capsule   Commonly known as:  ERGOCALCIFEROL   Take 1 capsule by mouth once a week for 12 doses                                                 Allergies as of 2017        Reactions    Ibuprofen     Unable to take due to gastric bypass      Immunizations as of 2017     Name Date Dose VIS Date Route    Influenza Virus Vaccine 2015 0.5 mL 2014 Intramuscular    Tdap (Boostrix, Adacel) 2015 0.5 mL 2015 Intramuscular      Last Vitals          Most Recent Value    Temp  96.8 °F (36 °C)    Pulse  76    Resp  16    BP  (!)  151/75         After Visit Summary    This summary was created for you. Thank you for entrusting your care to us. The following information includes details about your hospital/visit stay along with steps you should take to help with your recovery once you leave the hospital.  In this packet, you will find information about the topics listed below:    · Instructions about your medications including a list of your home medications  · A summary of your hospital visit  · Follow-up appointments once you have left the hospital  · Your care plan at home      You may receive a survey regarding the care you received during your stay. Your input is valuable to us. We encourage you to complete and return your survey in the envelope provided. We hope you will choose us in the future for your healthcare needs.           Patient Information     Patient Name DEBBIE Gianni Darnell 1965      Care Provided at:     Name Address Phone       [address] [phone]            Your Visit    Here you will find information about your visit, including the reason for your visit. Please take this sheet with you when you visit your doctor or other health care provider in the future. It will help determine the best possible medical care for you at that time. If you have any questions once you leave the hospital, please call the department phone number listed below. Why you were here     Your primary diagnosis was:  Not on File      Visit Information     Date & Time Department Dept. Phone    9/21/2017 [place] [phone]       Follow-up Appointments    Below is a list of your follow-up and future appointments. This may not be a complete list as you may have made appointments directly with providers that we are not aware of or your providers may have made some for you. Please call your providers to confirm appointments. It is important to keep your appointments. Please bring your current insurance card, photo ID, co-pay, and all medication bottles to your appointment. If self-pay, payment is expected at the time of service. Future Appointments     9/25/2017 8:00 AM     Appointment with [facility] EXAM ROOM 5 at [place] ([phone])   PREPS:  * Bring list of current medications * Please report to second floor, 2C (main elevators, turn right) 10 minutes prior to appointment ght)   [address] [address]       9/29/2017 8:00 AM     Appointment with Kami Linares MD at [address] ([phone])   Please arrive 15 minutes prior to appointment, bring photo ID and insurance card. [address] [facility] [address]  [facility] [address]       10/2/2017 8:00 AM     Appointment with Marcela Myers PT at [address] [facility] & [facility] ([phone]) Smoking harms nonsmokers. When nonsmokers are around people who smoke, they absorb nicotine, carbon monoxide, and other ingredients of tobacco smoke. DO NOT SMOKE AROUND CHILDREN     Children exposed to secondhand smoke are at an increased risk of:  Sudden Infant Death Syndrome (SIDS), acute respiratory infections, inflammation of the middle ear, and severe asthma. Over a longer time, it causes heart disease and lung cancer. There is no safe level of exposure to secondhand smoke. [employer]armahart Signup     Our records indicate that you have an active AG&P account. You can view your After Visit Summary by going to [email or web address] and logging in with your AG&P username and password. If you don't have a AG&P username and password but a parent or guardian has access to your record, the parent or guardian should login with their own AG&P username and password and access your record to view the After Visit Summary. Additional Information  If you have questions, please contact the physician practice where you receive care. Remember, AG&P is NOT to be used for urgent needs. For medical emergencies, dial 911. For questions regarding your [employer]t account call [phone]. If you have a clinical question, please call your doctor's office. View your information online  ? Review your current list of  medications, immunization, and allergies. ? Review your future test results online . ? Review your discharge instructions provided by your caregivers at discharge    Certain functionality such as prescription refills, scheduling appointments or sending messages to your provider are not activated if your provider does not use Control4 in his/her office    For questions regarding your [employer] account call [phone]. If you have a clinical question, please call your doctor's office. The information on all pages of the After Visit Summary has been reviewed with me, the patient and/or responsible adult, by my health care provider(s). I had the opportunity to ask questions regarding this information. I understand I should dispose of my armband safely at home to protect my health information. A complete copy of the After Visit Summary has been given to me, the patient and/or responsible adult.            Patient Signature/Responsible Adult:____________________    Clinician Signature:_____________________    Date:_____________________    Time:_____________________

## 2017-09-21 NOTE — PROGRESS NOTES
Patient being discharged in stable condition. Written and verbal instructions given to patient and family, they voice no questions are concerns.

## 2017-09-21 NOTE — IP AVS SNAPSHOT
Patient Information     Patient Name DEBBIE Rodriguez Been 1965         This is your updated medication list to keep with you all times      ASK your doctor about these medications     amitriptyline 50 MG tablet   Commonly known as:  ELAVIL   Take 1 tablet by mouth nightly       CYMBALTA 60 MG extended release capsule   Generic drug:  DULoxetine       docusate sodium 100 MG capsule   Commonly known as:  COLACE       ferrous sulfate 325 (65 Fe) MG EC tablet   Commonly known as:  FE TABS   Take 1 tablet by mouth 2 times daily       gabapentin 400 MG capsule   Commonly known as:  NEURONTIN   Take 1 capsule by mouth 2 times daily       HAIR SKIN AND NAILS FORMULA PO       HYDROcodone-acetaminophen 7.5-325 MG per tablet   Commonly known as:  NORCO   Take 1 tablet by mouth 2 times daily as needed for Pain .       iron sucrose 20 MG/ML injection   Commonly known as:  VENOFER   Infuse 10 mLs intravenously every 3 days for 5 doses Infuse slowly over at least 5 minutes.        metFORMIN 500 MG tablet   Commonly known as:  GLUCOPHAGE   Take 1 tablet by mouth 2 times daily (with meals)       mirtazapine 15 MG tablet   Commonly known as:  REMERON   Take 1 tablet by mouth nightly       traMADol 50 MG tablet   Commonly known as:  ULTRAM   1-2 tabs BID PRN       vitamin B-12 500 MCG tablet   Commonly known as:  CYANOCOBALAMIN       vitamin D 2000 units Caps capsule       vitamin D 89684 units Caps capsule   Commonly known as:  ERGOCALCIFEROL   Take 1 capsule by mouth once a week for 12 doses

## 2017-09-26 ENCOUNTER — HOSPITAL ENCOUNTER (OUTPATIENT)
Dept: NURSING | Age: 52
Discharge: HOME | End: 2017-09-26
Payer: COMMERCIAL

## 2017-09-26 VITALS
RESPIRATION RATE: 16 BRPM | TEMPERATURE: 97.1 F | DIASTOLIC BLOOD PRESSURE: 65 MMHG | HEART RATE: 86 BPM | SYSTOLIC BLOOD PRESSURE: 114 MMHG

## 2017-09-26 DIAGNOSIS — D50.9 IRON DEFICIENCY ANEMIA, UNSPECIFIED IRON DEFICIENCY ANEMIA TYPE: ICD-10-CM

## 2017-09-26 PROCEDURE — 6360000002 HC RX W HCPCS: Performed by: FAMILY MEDICINE

## 2017-09-26 PROCEDURE — 96365 THER/PROPH/DIAG IV INF INIT: CPT

## 2017-09-26 PROCEDURE — 2580000003 HC RX 258: Performed by: FAMILY MEDICINE

## 2017-09-26 RX ORDER — DIPHENHYDRAMINE HYDROCHLORIDE 50 MG/ML
50 INJECTION INTRAMUSCULAR; INTRAVENOUS ONCE
Status: CANCELLED | OUTPATIENT
Start: 2017-09-26 | End: 2017-09-26

## 2017-09-26 RX ORDER — HEPARIN SODIUM (PORCINE) LOCK FLUSH IV SOLN 100 UNIT/ML 100 UNIT/ML
500 SOLUTION INTRAVENOUS PRN
Status: CANCELLED | OUTPATIENT
Start: 2017-09-26

## 2017-09-26 RX ORDER — 0.9 % SODIUM CHLORIDE 0.9 %
10 VIAL (ML) INJECTION ONCE
Status: CANCELLED | OUTPATIENT
Start: 2017-09-26 | End: 2017-09-26

## 2017-09-26 RX ORDER — SODIUM CHLORIDE 0.9 % (FLUSH) 0.9 %
10 SYRINGE (ML) INJECTION PRN
Status: CANCELLED | OUTPATIENT
Start: 2017-09-26

## 2017-09-26 RX ORDER — SODIUM CHLORIDE 0.9 % (FLUSH) 0.9 %
5 SYRINGE (ML) INJECTION PRN
Status: CANCELLED | OUTPATIENT
Start: 2017-09-26

## 2017-09-26 RX ORDER — SODIUM CHLORIDE 9 MG/ML
INJECTION, SOLUTION INTRAVENOUS CONTINUOUS
Status: CANCELLED | OUTPATIENT
Start: 2017-09-26

## 2017-09-26 RX ORDER — SODIUM CHLORIDE 0.9 % (FLUSH) 0.9 %
10 SYRINGE (ML) INJECTION PRN
Status: DISCONTINUED | OUTPATIENT
Start: 2017-09-26 | End: 2017-09-27 | Stop reason: HOSPADM

## 2017-09-26 RX ORDER — METHYLPREDNISOLONE SODIUM SUCCINATE 125 MG/2ML
125 INJECTION, POWDER, LYOPHILIZED, FOR SOLUTION INTRAMUSCULAR; INTRAVENOUS ONCE
Status: CANCELLED | OUTPATIENT
Start: 2017-09-26 | End: 2017-09-26

## 2017-09-26 RX ADMIN — IRON SUCROSE 200 MG: 20 INJECTION, SOLUTION INTRAVENOUS at 08:25

## 2017-09-26 RX ADMIN — Medication 10 ML: at 08:24

## 2017-09-26 ASSESSMENT — PAIN - FUNCTIONAL ASSESSMENT: PAIN_FUNCTIONAL_ASSESSMENT: 0-10

## 2017-09-26 ASSESSMENT — PAIN DESCRIPTION - DESCRIPTORS: DESCRIPTORS: BURNING;CONSTANT

## 2017-09-26 NOTE — IP AVS SNAPSHOT
Patient Information     Patient Name DEBBIE Maynard 1965         This is your updated medication list to keep with you all times      ASK your doctor about these medications     amitriptyline 50 MG tablet   Commonly known as:  ELAVIL   Take 1 tablet by mouth nightly       CYMBALTA 60 MG extended release capsule   Generic drug:  DULoxetine       docusate sodium 100 MG capsule   Commonly known as:  COLACE       ferrous sulfate 325 (65 Fe) MG EC tablet   Commonly known as:  FE TABS   Take 1 tablet by mouth 2 times daily       gabapentin 400 MG capsule   Commonly known as:  NEURONTIN   Take 1 capsule by mouth 2 times daily       HAIR SKIN AND NAILS FORMULA PO       HYDROcodone-acetaminophen 7.5-325 MG per tablet   Commonly known as:  NORCO   Take 1 tablet by mouth 2 times daily as needed for Pain .       iron sucrose 20 MG/ML injection   Commonly known as:  VENOFER   Infuse 10 mLs intravenously every 3 days for 5 doses Infuse slowly over at least 5 minutes.        metFORMIN 500 MG tablet   Commonly known as:  GLUCOPHAGE   Take 1 tablet by mouth 2 times daily (with meals)       mirtazapine 15 MG tablet   Commonly known as:  REMERON   Take 1 tablet by mouth nightly       traMADol 50 MG tablet   Commonly known as:  ULTRAM   1-2 tabs BID PRN       vitamin B-12 500 MCG tablet   Commonly known as:  CYANOCOBALAMIN       vitamin D 2000 units Caps capsule       vitamin D 73239 units Caps capsule   Commonly known as:  ERGOCALCIFEROL   Take 1 capsule by mouth once a week for 12 doses

## 2017-09-26 NOTE — IP AVS SNAPSHOT
After Visit Summary  (Discharge Instructions)    Medication List for Home    Based on the information you provided to us as well as any changes during this visit, the following is your updated medication list.  Compare this with your prescription bottles at home. If you have any questions or concerns, contact your primary care physician's office. Daily Medication List (This medication list can be shared with any healthcare provider who is helping you manage your medications)      ASK your doctor about these medications if you have questions        Last Dose    Next Dose Due AM NOON PM NIGHT    amitriptyline 50 MG tablet   Commonly known as:  ELAVIL   Take 1 tablet by mouth nightly                                         CYMBALTA 60 MG extended release capsule   Generic drug:  DULoxetine   Take 60 mg by mouth daily                                         docusate sodium 100 MG capsule   Commonly known as:  COLACE   Take 100 mg by mouth as needed for Constipation                                         ferrous sulfate 325 (65 Fe) MG EC tablet   Commonly known as:  FE TABS   Take 1 tablet by mouth 2 times daily                                         gabapentin 400 MG capsule   Commonly known as:  NEURONTIN   Take 1 capsule by mouth 2 times daily                                         HAIR SKIN AND NAILS FORMULA PO   Take by mouth                                         HYDROcodone-acetaminophen 7.5-325 MG per tablet   Commonly known as:  NORCO   Take 1 tablet by mouth 2 times daily as needed for Pain .                                         iron sucrose 20 MG/ML injection   Commonly known as:  VENOFER   Infuse 10 mLs intravenously every 3 days for 5 doses Infuse slowly over at least 5 minutes.                 200 mg on 9/26/2017  8:25 AM                            metFORMIN 500 MG tablet   Commonly known as:  GLUCOPHAGE   Take 1 tablet by mouth 2 times daily (with meals) mirtazapine 15 MG tablet   Commonly known as:  REMERON   Take 1 tablet by mouth nightly                                         traMADol 50 MG tablet   Commonly known as:  ULTRAM   1-2 tabs BID PRN                                         vitamin B-12 500 MCG tablet   Commonly known as:  CYANOCOBALAMIN   Take 500 mcg by mouth daily                                         vitamin D 2000 units Caps capsule   Take by mouth daily                                         vitamin D 59867 units Caps capsule   Commonly known as:  ERGOCALCIFEROL   Take 1 capsule by mouth once a week for 12 doses                                                 Allergies as of 2017        Reactions    Ibuprofen     Unable to take due to gastric bypass      Immunizations as of 2017     Name Date Dose VIS Date Route    Influenza Virus Vaccine 2015 0.5 mL 2014 Intramuscular    Tdap (Boostrix, Adacel) 2015 0.5 mL 2015 Intramuscular      Last Vitals          Most Recent Value    Temp  97.1 °F (36.2 °C)    Pulse  68    Resp  16    BP  126/78         After Visit Summary    This summary was created for you. Thank you for entrusting your care to us. The following information includes details about your hospital/visit stay along with steps you should take to help with your recovery once you leave the hospital.  In this packet, you will find information about the topics listed below:    · Instructions about your medications including a list of your home medications  · A summary of your hospital visit  · Follow-up appointments once you have left the hospital  · Your care plan at home      You may receive a survey regarding the care you received during your stay. Your input is valuable to us. We encourage you to complete and return your survey in the envelope provided. We hope you will choose us in the future for your healthcare needs.           Patient Information     Patient Name DEBBIE Rupa Aleman 1965      Care Provided at:     Name Address Phone       [address] [phone]            Your Visit    Here you will find information about your visit, including the reason for your visit. Please take this sheet with you when you visit your doctor or other health care provider in the future. It will help determine the best possible medical care for you at that time. If you have any questions once you leave the hospital, please call the department phone number listed below. Why you were here     Your primary diagnosis was:  Not on File      Visit Information     Date & Time Department Dept. Phone    9/26/2017 Cintia Noriega [phone]       Follow-up Appointments    Below is a list of your follow-up and future appointments. This may not be a complete list as you may have made appointments directly with providers that we are not aware of or your providers may have made some for you. Please call your providers to confirm appointments. It is important to keep your appointments. Please bring your current insurance card, photo ID, co-pay, and all medication bottles to your appointment. If self-pay, payment is expected at the time of service. Future Appointments     9/27/2017 8:00 AM     Appointment with [facility] at [address] [place] [address] ([phone])   PREPS:  * Arrive 15 minutes prior  * No powder, lotion, or deodorant under the arms or around the breast area   [address]       9/29/2017 8:00 AM     Appointment with Fracisco Lopes MD at [address] [facility] ([phone])   Please arrive 15 minutes prior to appointment, bring photo ID and insurance card. [address] [facility] [address] [place] [address]       10/2/2017 8:00 AM     Appointment with Ruma Gray PT at [address] [facility] ([phone])   [address] [facility] [address]       10/4/2017 8:00 AM     Appointment with Michael Yost at [address] ([phone])   [address] [address]       10/9/2017 8:00 AM     Appointment with Michael Rodasa at [address] ([phone])   [address] [address]       10/11/2017 8:00 AM     Appointment with Baljinder Villegas PT at [address] [facility] [address] & [address] ([phone])   [address] [facility]       10/16/2017 8:00 AM     Appointment with Michael Rodasa at [address] [facility] [address] & [address] ([phone])   [address] [address]       10/18/2017 8:00 AM     Appointment with Michael Rodasa at [address] [facility] [address] ([phone])   [address] [address]       10/23/2017 8:00 AM     Appointment with Michael Rodasa at [address] [facility] [address] & [address] ([phone])   [address]  [facility] [address]       10/23/2017 9:00 AM     Appointment with Evern Prader, MD at [address] ([phone])   Please arrive 15 minutes prior to appointment time, bring insurance card and photo ID.    [address] [facility] [facility] [address]  [facility] [address]       10/25/2017 8:00 AM     Appointment with Baljinder Villegas PT at [address] [place] [address] & [address] ([phone])   [facility] [address]  [place] [address]       2/6/2018 8:45 AM     Appointment with Shireen Christopher MD at [facility] ([phone])   Please arrive 15 minutes prior to appointment, bring photo ID and insurance card. Please arrive 15 minutes prior to appointment, bring photo ID and insurance card. [address]  [facility] [address]         Preventive Care        Date Due    Hepatitis C screening is recommended for all adults regardless of risk factors born between [facility] at least once (lifetime) who have never been tested.  1965    HIV screening is recommended for all people regardless of risk factors aged 15-65 years at least once (lifetime) who have never been HIV tested. 6/8/1979    Pneumococcal Vaccine - Pneumovax for adults aged 19-64 years with: chronic heart disease, chronic lung disease, diabetes mellitus, alcoholism, chronic liver disease, or cigarette smoking. (1 of 1 - PPSV23) 6/8/1983    Colonoscopy 6/8/2014    Eye Exam By An Eye Doctor 1/15/2016    Yearly Flu Vaccine (1) 9/1/2017    Mammograms are recommended every 2 years for low/average risk patients aged 48 - 69, and every year for high risk patients per updated national guidelines. However these guidelines can be individualized by your provider. 6/1/2018    Diabetic Foot Exam 8/9/2018    Hemoglobin A1C (Test For Long-Term Glucose Control) 8/9/2018    Urine Check For Kidney Problems 8/9/2018    Cholesterol Screening 8/11/2018    Tetanus Combination Vaccine (2 - Td) 12/31/2025                 Care Plan Once You Return Home    This section includes instructions you will need to follow once you leave the hospital.  Your care team will discuss these with you, so you and those caring for you know how to best care for your health needs at home. This section may also include educational information about certain health topics that may be of help to you. Discharge Instructions       ACTIVITY:  Continue usual care with your doctor. Call your doctor immediately if any severe problems or go to the nearest emergency room. I have been treated and hereby acknowledge receiving this instruction sheet. Important information for a smoker       SMOKING: QUIT SMOKING. THIS IS THE MOST IMPORTANT ACTION YOU CAN TAKE TO IMPROVE YOUR CURRENT AND FUTURE HEALTH. Call the [facility] [place] NOW ([phone])    Smoking harms nonsmokers. When nonsmokers are around people who smoke, they absorb nicotine, carbon monoxide, and other ingredients of tobacco smoke.      DO NOT SMOKE AROUND CHILDREN Children exposed to secondhand smoke are at an increased risk of:  Sudden Infant Death Syndrome (SIDS), acute respiratory infections, inflammation of the middle ear, and severe asthma. Over a longer time, it causes heart disease and lung cancer. There is no safe level of exposure to secondhand smoke. MyChart Signup     Our records indicate that you have an active oneDrumhart account. You can view your After Visit Summary by going to [email or web address] and logging in with your Bflyt username and password. If you don't have a LeadPages username and password but a parent or guardian has access to your record, the parent or guardian should login with their own Bflyt username and password and access your record to view the After Visit Summary. Additional Information  If you have questions, please contact the physician practice where you receive care. Remember, oneDrumhart is NOT to be used for urgent needs. For medical emergencies, dial 911. For questions regarding your MyChart account call [phone]. If you have a clinical question, please call your doctor's office. View your information online  ? Review your current list of  medications, immunization, and allergies. ? Review your future test results online . ? Review your discharge instructions provided by your caregivers at discharge    Certain functionality such as prescription refills, scheduling appointments or sending messages to your provider are not activated if your provider does not use Pockethernet in his/her office    For questions regarding your MyChart account call [phone]. If you have a clinical question, please call your doctor's office. The information on all pages of the After Visit Summary has been reviewed with me, the patient and/or responsible adult, by my health care provider(s).  I had the opportunity to ask questions regarding this information. I understand I should dispose of my armband safely at home to protect my health information. A complete copy of the After Visit Summary has been given to me, the patient and/or responsible adult.            Patient Signature/Responsible Adult:____________________    Clinician Signature:_____________________    Date:_____________________    Time:_____________________

## 2017-09-26 NOTE — PROGRESS NOTES
_M___ Safety:       (Environmental)   [place] to environment   Ensure ID band is correct and in place/ allergy band as needed   Assess for fall risk   Initiate fall precautions as applicable (fall band, side rails, etc.)   Call light within reach   Bed in low position/ wheels locked    _M___ Pain:        Assess pain level and characteristics   Administer analgesics as ordered   Assess effectiveness of pain management and report to MD as needed    _M___ Knowledge Deficit:   Assess baseline knowledge   Provide teaching at level of understanding   Provide teaching via preferred learning method   Evaluate teaching effectiveness    _M___ Hemodynamic/Respiratory Status:       (Pre and Post Procedure Monitoring)   Assess/Monitor vital signs and LOC         Assess vital signs/ LOC until patient meets discharge criteria    notify MD of any issues    

## 2017-09-26 NOTE — PROGRESS NOTES
Pt here for iron infusion. Pt familiar with procedure. Rights and responsibilities reviewed with patient. 825: Iron infusion started. 932: Infusion complete. Pt tolerated well. 936: Discharge instructions given. Pt verbalizes understanding. Pt discharged ambulatory in stable condition.

## 2017-09-27 ENCOUNTER — HOSPITAL ENCOUNTER (OUTPATIENT)
Dept: MAMMOGRAPHY | Age: 52
Discharge: HOME | End: 2017-09-27
Payer: COMMERCIAL

## 2017-09-27 DIAGNOSIS — Z12.31 ENCOUNTER FOR SCREENING MAMMOGRAM FOR BREAST CANCER: ICD-10-CM

## 2017-09-27 PROCEDURE — G0202 SCR MAMMO BI INCL CAD: HCPCS

## 2017-09-29 DIAGNOSIS — G89.29 CHRONIC MIDLINE LOW BACK PAIN, WITH SCIATICA PRESENCE UNSPECIFIED: ICD-10-CM

## 2017-09-29 DIAGNOSIS — M54.5 CHRONIC MIDLINE LOW BACK PAIN, WITH SCIATICA PRESENCE UNSPECIFIED: ICD-10-CM

## 2017-09-29 DIAGNOSIS — G62.9 NEUROPATHY: ICD-10-CM

## 2017-09-29 DIAGNOSIS — G82.54 INCOMPLETE QUADRIPLEGIA AT C5-C8 LEVEL (HCC): ICD-10-CM

## 2017-10-02 ENCOUNTER — HOSPITAL ENCOUNTER (OUTPATIENT)
Dept: PHYSICAL THERAPY | Age: 52
Setting detail: THERAPIES SERIES
Discharge: HOME | End: 2017-10-02
Payer: COMMERCIAL

## 2017-10-02 ENCOUNTER — HOSPITAL ENCOUNTER (OUTPATIENT)
Dept: WOMENS IMAGING | Age: 52
Discharge: HOME | End: 2017-10-02
Payer: COMMERCIAL

## 2017-10-02 DIAGNOSIS — R92.2 BREAST DENSITY: ICD-10-CM

## 2017-10-02 PROCEDURE — 97110 THERAPEUTIC EXERCISES: CPT

## 2017-10-02 PROCEDURE — 76642 ULTRASOUND BREAST LIMITED: CPT

## 2017-10-02 RX ORDER — HYDROCODONE BITARTRATE AND ACETAMINOPHEN 7.5; 325 MG/1; MG/1
1 TABLET ORAL 3 TIMES DAILY PRN
Qty: 90 TABLET | Refills: 0 | Status: SHIPPED | OUTPATIENT
Start: 2017-10-04 | End: 2017-11-08 | Stop reason: SDUPTHER

## 2017-10-02 ASSESSMENT — PAIN DESCRIPTION - PAIN TYPE: TYPE: CHRONIC PAIN

## 2017-10-02 ASSESSMENT — PAIN DESCRIPTION - LOCATION: LOCATION: LEG;HIP;SHOULDER
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
## 2017-10-04 ENCOUNTER — HOSPITAL ENCOUNTER (OUTPATIENT)
Dept: PHYSICAL THERAPY | Age: 52
Setting detail: THERAPIES SERIES
Discharge: HOME | End: 2017-10-04
Payer: COMMERCIAL

## 2017-10-04 PROCEDURE — 97113 AQUATIC THERAPY/EXERCISES: CPT

## 2017-10-04 ASSESSMENT — PAIN SCALES - GENERAL: PAINLEVEL_OUTOF10: 9

## 2017-10-04 NOTE — PROGRESS NOTES
Strengthening LE  Step-Ups (F,L,R): Forward and lateral x10 bilateral at 4' steps         Activity Tolerance:  Activity Tolerance: Patient Tolerated treatment well  Activity Tolerance: Pain decreased to 5/10 at bilateral legs    Assessment: Body structures, Functions, Activity limitations: Decreased functional mobility , Decreased ROM, Decreased strength, Decreased endurance, Decreased sensation, Decreased balance, Decreased high-level IADLs, Decreased coordination  Assessment: Rehab tech in pool with patient throughout session but patient did not require assistance. Patient tolerated pool exercises well. Left knee pain with lunges but was better once technique was corrected and patient instructed not to go so deep into lunge position. Patient very talkative throughout session and needed cues to stay on task. Leg pain decreased to 5/10 by end of session. Discharge Recommendations: Continue to assess pending progress    Patient Education:  Patient Education: Continue with current HEP, Monitor pain/fatigue    Plan:  Times per week: 2x/week  Plan weeks: 6 weeks  Specific instructions for Next Treatment: Ther ex, aquatics, neuro ex for core/UE/LE strengthening, balance, decreased pain, gait  Current Treatment Recommendations: Strengthening, ROM, Balance Training, Functional Mobility Training, Endurance Training, Gait Training, Stair training, Neuromuscular Re-education, Pain Management, Home Exercise Program, Safety Education & Training, Patient/Caregiver Education & Training, Equipment Evaluation, Education, & procurement  Plan Comment: Continue with plan of care    Goals:  Patient goals : Walk normal, floor transfer    Short term goals  Time Frame for Short term goals: 4 weeks  Short term goal 1: Pt to demo gait with device as needed x100 feet with each swing foot passing stance foot for improved safety with walking.   Short term goal 2: Pt to demo up/down 4 steps with 1 handrail with safe technique with modified

## 2017-10-09 ENCOUNTER — HOSPITAL ENCOUNTER (OUTPATIENT)
Dept: PHYSICAL THERAPY | Age: 52
Setting detail: THERAPIES SERIES
Discharge: HOME | End: 2017-10-09
Payer: COMMERCIAL

## 2017-10-09 PROCEDURE — 97113 AQUATIC THERAPY/EXERCISES: CPT

## 2017-10-09 ASSESSMENT — PAIN SCALES - GENERAL: PAINLEVEL_OUTOF10: 6

## 2017-10-09 NOTE — PROGRESS NOTES
x15  Marching: x1 minute with 1 UE   Hamstring Curls: x15 bilateral   4-Way Hip: 3-way x15 bilateral - cues needed for posture  Dynamic Strengthening LE  Step-Ups (F,L,R): Forward and lateral x15 bilateral at 4' steps  Deep H2O LE  Hang: x3 minutes with noodle in 4'10\" at end of session. Activity Tolerance:  Activity Tolerance: Patient Tolerated treatment well    Assessment: Body structures, Functions, Activity limitations: Decreased functional mobility , Decreased ROM, Decreased strength, Decreased endurance, Decreased sensation, Decreased balance, Decreased high-level IADLs, Decreased coordination  Assessment: Patient completed exercises in pool today without tech in pool. Patient tolerated fairly well. Pain remained 5/10 at end of session. Cues needed for posture and abdominal bracing. Slight increase in Right shoulder pain with UE exercises. Held lunges due to increased knee pain with this activity. Discharge Recommendations: Continue to assess pending progress    Patient Education:  Patient Education: Continue with current HEP, Monitor pain/fatigue     Plan:  Times per week: 2x/week  Plan weeks: 6 weeks  Specific instructions for Next Treatment: Ther ex, aquatics, neuro ex for core/UE/LE strengthening, balance, decreased pain, gait  Current Treatment Recommendations: Strengthening, ROM, Balance Training, Functional Mobility Training, Endurance Training, Gait Training, Stair training, Neuromuscular Re-education, Pain Management, Home Exercise Program, Safety Education & Training, Patient/Caregiver Education & Training, Equipment Evaluation, Education, & procurement  Plan Comment: Continue with plan of care    Goals:  Patient goals : Walk normal, floor transfer    Short term goals  Time Frame for Short term goals: 4 weeks  Short term goal 1: Pt to demo gait with device as needed x100 feet with each swing foot passing stance foot for improved safety with walking.   Short term goal 2: Pt to demo up/down

## 2017-10-10 ENCOUNTER — OFFICE VISIT (OUTPATIENT)
Dept: PHYSICAL MEDICINE AND REHAB | Age: 52
End: 2017-10-10
Payer: COMMERCIAL

## 2017-10-10 VITALS
HEIGHT: 67 IN | SYSTOLIC BLOOD PRESSURE: 117 MMHG | HEART RATE: 73 BPM | BODY MASS INDEX: 26.06 KG/M2 | WEIGHT: 166 LBS | DIASTOLIC BLOOD PRESSURE: 75 MMHG

## 2017-10-10 DIAGNOSIS — M54.5 CHRONIC MIDLINE LOW BACK PAIN, WITH SCIATICA PRESENCE UNSPECIFIED: ICD-10-CM

## 2017-10-10 DIAGNOSIS — G82.54 INCOMPLETE QUADRIPLEGIA AT C5-C8 LEVEL (HCC): ICD-10-CM

## 2017-10-10 DIAGNOSIS — G89.29 CHRONIC BILATERAL LOW BACK PAIN, WITH SCIATICA PRESENCE UNSPECIFIED: ICD-10-CM

## 2017-10-10 DIAGNOSIS — M54.16 LUMBAR RADICULITIS: Primary | ICD-10-CM

## 2017-10-10 DIAGNOSIS — M48.061 LUMBAR STENOSIS: ICD-10-CM

## 2017-10-10 DIAGNOSIS — G62.9 NEUROPATHY: ICD-10-CM

## 2017-10-10 DIAGNOSIS — M54.5 CHRONIC BILATERAL LOW BACK PAIN, WITH SCIATICA PRESENCE UNSPECIFIED: ICD-10-CM

## 2017-10-10 DIAGNOSIS — M25.532 LEFT WRIST PAIN: ICD-10-CM

## 2017-10-10 DIAGNOSIS — G89.29 CHRONIC MIDLINE LOW BACK PAIN, WITH SCIATICA PRESENCE UNSPECIFIED: ICD-10-CM

## 2017-10-10 PROCEDURE — 99213 OFFICE O/P EST LOW 20 MIN: CPT | Performed by: NURSE PRACTITIONER

## 2017-10-10 ASSESSMENT — ENCOUNTER SYMPTOMS: BACK PAIN: 1

## 2017-10-10 NOTE — PROGRESS NOTES
[facility]  [facility]  [address]  Dept: [phone]  Dept Fax: [phone]: [phone]    Visit Date: 10/10/2017    Functionality Assessment/Goals Worksheet     On a scale of 0 (Does not Interfere) to 10 (Completely Interferes)     1. Which number describes how during the past week pain has interfered with       the following:  A. General Activity:  9  B. Mood: 10  C. Walking Ability:  10  D. Normal Work (Includes both work outside the home and housework):  9  E. Relations with Other People:   8  F. Sleep:   6  G. Enjoyment of Life:   10    2. Patient Prefers to Take their Pain Medications:     [x]  On a regular basis   [x]  Only when necessary    []  Does not take pain medications    3. What are the Patient's Goals/Expectations for Visiting Pain Management? []  Learn about my pain    [x]  Receive Medication   []  Physical Therapy     []  Treat Depression   []  Receive Injections    []  Treat Sleep   []  Deal with Anxiety and Stress   []  Treat Opoid Dependence/Addiction   []  Other:      HPI:   Omid Brandon is a 47 y.o. female is here today for    Chief Complaint:  Bilateral leg pain, lower back, left hand, neck pain     Family present    HPI   2 week FU.to review MRI. Main complain is bilateral leg pain throughout legs and feet- described as burning constant, numbness \"severe constant pain\". From hips to toes. Pain across lower back is bruising sore pain, sharp pain. Pain bothersome 60% in legs and 40% back. Pain is equal bilaterally. Activity remains limited, not tolerating walking or standing long periods of time. Feels that alternating Ultram and Norco helps much better then just the increase in Norco.   Currently ambulating without any assist devices. Medications reviewed. Patient denies  side effects with medications. Patient states she is  taking medications as prescribed.  She denies receiving pain medications material. There is a broad central disc protrusion. There are thickened ligaments and mild facet degenerative changes bilaterally. There is resultant mild central canal with moderate right more than left    subarticular stenosis. There is bilateral foraminal stenosis, mild/moderate.       Mild bulging disc material is associated at the L5-S1 level with a superimposed central and right paracentral disc protrusion, small. There is mild facet degeneration bilaterally. There is no compromise of the thecal sac, though effacement of the ventral    epidural fat is demonstrated. There is some mild stenosis of the right lateral recess, potentially compromising the intracanal right S1 neural elements, which appears slightly posteriorly deviated due to the disc protrusion. There is only very mild    foraminal stenosis bilaterally.       Surrounding tissues are nonspecific.           Impression   NO EVIDENCE OF ACUTE ABNORMALITY.       DEGENERATIVE DISC AND FACET CHANGES ARE PRESENT, MOST IMPRESSIVELY AT THE L3-4 AND L4-5 AND L5-S1 LEVELS. SECONDARY STENOSIS OF THESE REGIONS PRODUCED, MOST IMPRESSIVELY INVOLVING THE LEFT L3-4 FORAMEN, MODERATE SEVERE TO SEVERE, THE RIGHT MORE THAN LEFT    LATERAL RECESSES AT L4-5, MODERATE, WITH BILATERAL L4-5 FORAMINAL STENOSIS, MILD/MODERATE, AND MILD RIGHT LATERAL RECESS STENOSIS AT L5-S1. THESE ARE POSSIBLE EXPLANATION FOR BILATERAL LOWER EXTREMITY RADICULOPATHY, AS DISCUSSED.           The patient is allergic to ibuprofen. Past Medical History  Mery Grissom  has a past medical history of DM2 (diabetes mellitus, type 2) ([address] Endometriosis; Esophageal stricture; Female bladder prolapse; Former smoker; History of gastric bypass; History of hypertension; Hypotension; Iron deficiency anemia; MDRO (multiple drug resistant organisms) resistance; Obesity; Pulmonary nodule; Spinal cord injury, C5-C7 ([address] and Vitamin D deficiency.     Past Surgical History  The patient  has a past surgical history Rfl:     Cholecalciferol (VITAMIN D) 2000 UNITS CAPS capsule, Take by mouth daily , Disp: , Rfl:     mirtazapine (REMERON) 15 MG tablet, Take 1 tablet by mouth nightly, Disp: 30 tablet, Rfl: 0    metFORMIN (GLUCOPHAGE) 500 MG tablet, Take 1 tablet by mouth 2 times daily (with meals), Disp: 60 tablet, Rfl: 0    iron sucrose (VENOFER) 20 MG/ML injection, Infuse 10 mLs intravenously every 3 days for 5 doses Infuse slowly over at least 5 minutes. , Disp: 50 mL, Rfl: 0    Subjective:      Review of Systems   Musculoskeletal: Positive for arthralgias, back pain, gait problem, myalgias, neck pain and neck stiffness. Neurological: Positive for weakness and numbness. Objective:     Vitals:    10/10/17 0948   BP: 117/75   Pulse: 73   Weight: 166 lb (75.3 kg)   Height: 5' 7\" (1.702 m)       Physical Exam   Constitutional: She is oriented to person, place, and time. She appears well-developed and well-nourished. No distress. HENT:   Head: Normocephalic and atraumatic. Right Ear: External ear normal.   Left Ear: External ear normal.   Nose: Nose normal.   Mouth/Throat: Oropharynx is clear and moist. No oropharyngeal exudate. Eyes: Conjunctivae and EOM are normal. Pupils are equal, round, and reactive to light. Right eye exhibits no discharge. Left eye exhibits no discharge. No scleral icterus. Neck: Normal range of motion and full passive range of motion without pain. Neck supple. No muscular tenderness present. No neck rigidity. No edema, no erythema and normal range of motion present. No thyromegaly present. Cardiovascular: Normal rate, regular rhythm, normal heart sounds and intact distal pulses. Exam reveals no gallop and no friction rub. No murmur heard. Pulmonary/Chest: Effort normal and breath sounds normal. No respiratory distress. She has no wheezes. She has no rales. She exhibits no tenderness. Abdominal: Soft. Bowel sounds are normal. She exhibits no distension. There is no tenderness. Babinski's sign on the right side. Reflex Scores:       Tricep reflexes are 1+ on the right side and 1+ on the left side. Bicep reflexes are 1+ on the right side and 1+ on the left side. Brachioradialis reflexes are 1+ on the right side and 1+ on the left side. Patellar reflexes are 2+ on the right side and 2+ on the left side. Achilles reflexes are 2+ on the right side and 2+ on the left side. SLR -  Motor 3/5 LUE LLE RUE 4/5 RLE 4/5   Skin: Skin is warm. No rash noted. She is not diaphoretic. No erythema. No pallor. Psychiatric: She has a normal mood and affect. Her speech is normal and behavior is normal. Judgment and thought content normal. Her mood appears not anxious. Her affect is not angry, not blunt, not labile and not inappropriate. She is not agitated, not aggressive, not hyperactive, not slowed, not withdrawn, not actively hallucinating and not combative. Thought content is not paranoid and not delusional. Cognition and memory are normal. Cognition and memory are not impaired. She does not express impulsivity or inappropriate judgment. She does not exhibit a depressed mood. She expresses no homicidal and no suicidal ideation. She expresses no suicidal plans and no homicidal plans. She exhibits normal recent memory and normal remote memory. She is attentive. Nursing note and vitals reviewed. Assessment:     1. Lumbar radiculitis    2. Lumbar stenosis    3. Incomplete quadriplegia at C5-C8 level ([address]    4. Left wrist pain    5. Neuropathy ([address]    6. Chronic midline low back pain, with sciatica presence unspecified    7. Chronic bilateral low back pain, with sciatica presence unspecified            Plan:      · OARRS reviewed. · Discussed long term side effects of medications. · Discussed tolerance, dependency and addiction. · Previous UDS reviewed. · UDS preformed today for compliance.   · Patient told can not receive any pain medications from any other source. · Discussed with pt.may not be pain free. · No evidence of abuse, diversion or aberrant behavior. · Medications and/or procedures improve function and quality of life. · L-MRI reviewed in detail. · Discussed L-facet MBB and LESI, radicular symptoms are main complaint. · Plan LESI @ L3-4, Procedure and risks discussed in detail with patient. · Continue Neurontin 400 mg BID, Continue Norco 7.5/325 TID prn   · Continue elavil and Cymbalta. · Marley Coe recently filled 10/4/2017- amandeep     Previous Treatments tried:  · PT: Yes,  any benefit? minimal, how many weeks? Currently participating in water therapy, had some earlier 2017 6 weeks,  12 last date done: Summer 2016  · NSAIDs: No,  any benefit? No,  how long taken: unable to take  · Chiropractic: Yes,  any benefit? No  · Muscle relaxants: Yes,  any benefit? No  · Narcotics: Yes,  any benefit? Yes  · Spine surgeon consult: Yes Dr Tres Montero. Prescribed:   No orders of the defined types were placed in this encounter. Return for LESI @ L3-4. , follow up after procedure.          Electronically signed by Aimee Smith CNP on 10/10/2017 at 10:28 AM

## 2017-10-11 ENCOUNTER — HOSPITAL ENCOUNTER (OUTPATIENT)
Dept: PHYSICAL THERAPY | Age: 52
Setting detail: THERAPIES SERIES
Discharge: HOME | End: 2017-10-11
Payer: COMMERCIAL

## 2017-10-11 PROCEDURE — 97113 AQUATIC THERAPY/EXERCISES: CPT

## 2017-10-11 ASSESSMENT — PAIN DESCRIPTION - PAIN TYPE: TYPE: CHRONIC PAIN

## 2017-10-11 ASSESSMENT — PAIN SCALES - GENERAL: PAINLEVEL_OUTOF10: 8

## 2017-10-11 ASSESSMENT — PAIN DESCRIPTION - LOCATION: LOCATION: LEG;HIP;SHOULDER

## 2017-10-11 NOTE — PROGRESS NOTES
Calf stretch at 4' steps 3x 15 seconds Bilateral   Static Strengthening UEs  Shoulder Flex: x10 in 4'10\"  Shoulder ABD/ADD: x10 in 4'10\" - pain in right shoulder today  Shoulder Horiz ABD/ADD: x10 in 4'10\"  Rows: x10 in 4'10\"        Static Strengthening LEs  Heel/Toe Raises: x15 each  Squats: x15  Marching: x1 minute with 1 UE   Hamstring Curls: x15 bilateral   4-Way Hip: 3-way x15 bilateral - cues needed for posture  Lunges (F,L,R): Forward x5 bilateral - pain at left knee with flexion  Dynamic Strengthening LE  Step-Ups (F,L,R): Forward and lateral x10 bilateral at 4' steps - could only do 10x instead of 15x due to pain today  Deep H2O LE  Hang: x3 minutes with noodle in 4'10\" at end of session. Hip Flex/Ext: on noodle 10x in 4'10\" - limited range  Hip ABD/ADD: on noodle 10x in 4'10\" - limited range        Activity Tolerance:  Activity Tolerance: Patient Tolerated treatment well  Activity Tolerance: Patient reports no change in pain after session. Assessment: Body structures, Functions, Activity limitations: Decreased functional mobility , Decreased ROM, Decreased strength, Decreased endurance, Decreased sensation, Decreased balance, Decreased high-level IADLs, Decreased coordination  Assessment: Patient limited with certain activities due to increased pain today. Able to add in 2 new exercises though. Still needs cueing on posture with activities.   Prognosis: Good  Discharge Recommendations: Continue to assess pending progress    Patient Education:  Patient Education: Continue with current HEP, Monitor pain/fatigue                      Plan:  Times per week: 2x/week  Plan weeks: 6 weeks  Specific instructions for Next Treatment: Ther ex, aquatics, neuro ex for core/UE/LE strengthening, balance, decreased pain, gait  Current Treatment Recommendations: Strengthening, ROM, Balance Training, Functional Mobility Training, Endurance Training, Gait Training, Stair training, Neuromuscular Re-education, Pain

## 2017-10-16 ENCOUNTER — HOSPITAL ENCOUNTER (OUTPATIENT)
Dept: PHYSICAL THERAPY | Age: 52
Setting detail: THERAPIES SERIES
End: 2017-10-16
Payer: COMMERCIAL

## 2017-10-16 ENCOUNTER — APPOINTMENT (OUTPATIENT)
Dept: GENERAL RADIOLOGY | Age: 52
End: 2017-10-16
Attending: PAIN MEDICINE
Payer: COMMERCIAL

## 2017-10-16 ENCOUNTER — ANESTHESIA (OUTPATIENT)
Dept: OPERATING ROOM | Age: 52
End: 2017-10-16
Payer: COMMERCIAL

## 2017-10-16 ENCOUNTER — ANESTHESIA EVENT (OUTPATIENT)
Dept: OPERATING ROOM | Age: 52
End: 2017-10-16
Payer: COMMERCIAL

## 2017-10-16 ENCOUNTER — HOSPITAL ENCOUNTER (OUTPATIENT)
Age: 52
Setting detail: OUTPATIENT SURGERY
Discharge: HOME | End: 2017-10-16
Attending: PAIN MEDICINE | Admitting: PAIN MEDICINE
Payer: COMMERCIAL

## 2017-10-16 VITALS
HEIGHT: 67 IN | HEART RATE: 64 BPM | RESPIRATION RATE: 14 BRPM | TEMPERATURE: 98.1 F | WEIGHT: 165.4 LBS | BODY MASS INDEX: 25.96 KG/M2 | OXYGEN SATURATION: 98 % | SYSTOLIC BLOOD PRESSURE: 123 MMHG | DIASTOLIC BLOOD PRESSURE: 77 MMHG

## 2017-10-16 VITALS — SYSTOLIC BLOOD PRESSURE: 144 MMHG | DIASTOLIC BLOOD PRESSURE: 85 MMHG | OXYGEN SATURATION: 99 %

## 2017-10-16 PROCEDURE — 2580000003 HC RX 258: Performed by: PAIN MEDICINE

## 2017-10-16 PROCEDURE — 6360000004 HC RX CONTRAST MEDICATION: Performed by: PAIN MEDICINE

## 2017-10-16 PROCEDURE — 7100000011 HC PHASE II RECOVERY - ADDTL 15 MIN: Performed by: PAIN MEDICINE

## 2017-10-16 PROCEDURE — 2580000003 HC RX 258: Performed by: NURSE ANESTHETIST, CERTIFIED REGISTERED

## 2017-10-16 PROCEDURE — 7100000010 HC PHASE II RECOVERY - FIRST 15 MIN: Performed by: PAIN MEDICINE

## 2017-10-16 PROCEDURE — 3209999900 FLUORO FOR SURGICAL PROCEDURES

## 2017-10-16 PROCEDURE — 2500000003 HC RX 250 WO HCPCS: Performed by: PAIN MEDICINE

## 2017-10-16 PROCEDURE — 6360000002 HC RX W HCPCS: Performed by: NURSE ANESTHETIST, CERTIFIED REGISTERED

## 2017-10-16 PROCEDURE — 2500000003 HC RX 250 WO HCPCS: Performed by: NURSE ANESTHETIST, CERTIFIED REGISTERED

## 2017-10-16 PROCEDURE — 3700000000 HC ANESTHESIA ATTENDED CARE: Performed by: PAIN MEDICINE

## 2017-10-16 PROCEDURE — 6360000002 HC RX W HCPCS: Performed by: PAIN MEDICINE

## 2017-10-16 PROCEDURE — 3600000054 HC PAIN LEVEL 3 BASE: Performed by: PAIN MEDICINE

## 2017-10-16 PROCEDURE — 62323 NJX INTERLAMINAR LMBR/SAC: CPT | Performed by: PAIN MEDICINE

## 2017-10-16 RX ORDER — SODIUM CHLORIDE 9 MG/ML
INJECTION, SOLUTION INTRAVENOUS CONTINUOUS PRN
Status: DISCONTINUED | OUTPATIENT
Start: 2017-10-16 | End: 2017-10-16 | Stop reason: SDUPTHER

## 2017-10-16 RX ORDER — PROPOFOL 10 MG/ML
INJECTION, EMULSION INTRAVENOUS PRN
Status: DISCONTINUED | OUTPATIENT
Start: 2017-10-16 | End: 2017-10-16 | Stop reason: SDUPTHER

## 2017-10-16 RX ORDER — LIDOCAINE HYDROCHLORIDE 10 MG/ML
INJECTION, SOLUTION INFILTRATION; PERINEURAL PRN
Status: DISCONTINUED | OUTPATIENT
Start: 2017-10-16 | End: 2017-10-16 | Stop reason: SDUPTHER

## 2017-10-16 RX ORDER — DEXAMETHASONE SODIUM PHOSPHATE 4 MG/ML
INJECTION, SOLUTION INTRA-ARTICULAR; INTRALESIONAL; INTRAMUSCULAR; INTRAVENOUS; SOFT TISSUE PRN
Status: DISCONTINUED | OUTPATIENT
Start: 2017-10-16 | End: 2017-10-16 | Stop reason: HOSPADM

## 2017-10-16 RX ORDER — LIDOCAINE HYDROCHLORIDE 10 MG/ML
INJECTION, SOLUTION INFILTRATION; PERINEURAL PRN
Status: DISCONTINUED | OUTPATIENT
Start: 2017-10-16 | End: 2017-10-16 | Stop reason: HOSPADM

## 2017-10-16 RX ORDER — 0.9 % SODIUM CHLORIDE 0.9 %
VIAL (ML) INJECTION PRN
Status: DISCONTINUED | OUTPATIENT
Start: 2017-10-16 | End: 2017-10-16 | Stop reason: HOSPADM

## 2017-10-16 RX ADMIN — PROPOFOL 20 MG: 10 INJECTION, EMULSION INTRAVENOUS at 11:08

## 2017-10-16 RX ADMIN — PROPOFOL 50 MG: 10 INJECTION, EMULSION INTRAVENOUS at 11:05

## 2017-10-16 RX ADMIN — PROPOFOL 10 MG: 10 INJECTION, EMULSION INTRAVENOUS at 11:09

## 2017-10-16 RX ADMIN — LIDOCAINE HYDROCHLORIDE 20 MG: 10 INJECTION, SOLUTION INFILTRATION; PERINEURAL at 11:05

## 2017-10-16 RX ADMIN — PROPOFOL 20 MG: 10 INJECTION, EMULSION INTRAVENOUS at 11:06

## 2017-10-16 RX ADMIN — SODIUM CHLORIDE: 9 INJECTION, SOLUTION INTRAVENOUS at 11:02

## 2017-10-16 ASSESSMENT — PAIN DESCRIPTION - LOCATION: LOCATION: BACK

## 2017-10-16 ASSESSMENT — PULMONARY FUNCTION TESTS
PIF_VALUE: 0

## 2017-10-16 ASSESSMENT — PAIN DESCRIPTION - DESCRIPTORS: DESCRIPTORS: BURNING

## 2017-10-16 ASSESSMENT — PAIN - FUNCTIONAL ASSESSMENT: PAIN_FUNCTIONAL_ASSESSMENT: 0-10

## 2017-10-16 ASSESSMENT — PAIN DESCRIPTION - ORIENTATION: ORIENTATION: LOWER

## 2017-10-16 ASSESSMENT — PAIN SCALES - GENERAL: PAINLEVEL_OUTOF10: 7

## 2017-10-16 ASSESSMENT — PAIN DESCRIPTION - DIRECTION: RADIATING_TOWARDS: BILATERAL LEGS

## 2017-10-16 ASSESSMENT — PAIN DESCRIPTION - PAIN TYPE: TYPE: CHRONIC PAIN

## 2017-10-16 NOTE — H&P
[facility]  History and Physical Update    Pt Name: Ronnie Pina  MRN: [id number]  YOB: 1965  Date of evaluation: 10/10/2017    I have examined the patient and reviewed the H&P/Consult and there are no changes to the patient or plans.         Electronically signed by Faye Acosta MD on 10/16/2017 at 9:48 AM
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
## 2017-10-16 NOTE — OP NOTE
with lumbar epidural steroid injection. The procedure and risks were discussed with the patient and an informed consent was obtained    Procedure: The patient is placed in prone position. Skin over the back was prepped and draped in sterile manner. Then using fluoroscopy the L3 interspace was observed and the skin and deep tissues in the  paramedian area were infiltrated with 3 ml of 1% lidocaine. The #20-gauge, 3-1/2 inch Tuohy needle was inserted through the skin wheal and the epidural space was identified using loss of resistance technique . This was confirmed with AP and lateral views using fluoroscopy after injecting about 2 ml of Omnipaque-180 and observing the spread of the contrast in the epidural space. Then after negative aspiration a total of 6 mg of dexamethasone with 2 ml of normal saline was injected into the epidural space. The needle is removed and a Band-Aid was placed over the needle insertion site. No paresthesia. Patient's vital signs remained stable and the patient tolerated the procedure well. The patient will be discharged home in stable condition and will be followed in the office in the next few weeks for further planning.     Electronically signed by Lashae Ford MD on 10/16/2017 at 11:12 AM

## 2017-10-18 ENCOUNTER — HOSPITAL ENCOUNTER (OUTPATIENT)
Dept: PHYSICAL THERAPY | Age: 52
Setting detail: THERAPIES SERIES
Discharge: HOME | End: 2017-10-18
Payer: COMMERCIAL

## 2017-10-18 PROCEDURE — 97113 AQUATIC THERAPY/EXERCISES: CPT

## 2017-10-18 ASSESSMENT — PAIN SCALES - GENERAL: PAINLEVEL_OUTOF10: 7
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
## 2017-10-19 ENCOUNTER — TELEPHONE (OUTPATIENT)
Dept: FAMILY MEDICINE CLINIC | Age: 52
End: 2017-10-19

## 2017-10-22 ENCOUNTER — HOSPITAL ENCOUNTER (EMERGENCY)
Age: 52
Discharge: HOME | End: 2017-10-22
Payer: COMMERCIAL

## 2017-10-22 ENCOUNTER — APPOINTMENT (OUTPATIENT)
Dept: GENERAL RADIOLOGY | Age: 52
End: 2017-10-22
Payer: COMMERCIAL

## 2017-10-22 ENCOUNTER — APPOINTMENT (OUTPATIENT)
Dept: CT IMAGING | Age: 52
End: 2017-10-22
Payer: COMMERCIAL

## 2017-10-22 VITALS
WEIGHT: 165 LBS | TEMPERATURE: 98.4 F | DIASTOLIC BLOOD PRESSURE: 76 MMHG | HEIGHT: 67 IN | HEART RATE: 74 BPM | OXYGEN SATURATION: 100 % | SYSTOLIC BLOOD PRESSURE: 127 MMHG | RESPIRATION RATE: 14 BRPM | BODY MASS INDEX: 25.9 KG/M2

## 2017-10-22 DIAGNOSIS — S01.81XA FACIAL LACERATION, INITIAL ENCOUNTER: ICD-10-CM

## 2017-10-22 DIAGNOSIS — S09.90XA INJURY OF HEAD, INITIAL ENCOUNTER: Primary | ICD-10-CM

## 2017-10-22 DIAGNOSIS — S80.01XA CONTUSION OF RIGHT KNEE, INITIAL ENCOUNTER: ICD-10-CM

## 2017-10-22 DIAGNOSIS — S60.00XA CONTUSION OF FINGER OF LEFT HAND, UNSPECIFIED FINGER, INITIAL ENCOUNTER: ICD-10-CM

## 2017-10-22 LAB
ANION GAP SERPL CALCULATED.3IONS-SCNC: 5 MEQ/L (ref 8–16)
ANISOCYTOSIS: ABNORMAL
BASOPHILS # BLD: 0.3 %
BASOPHILS ABSOLUTE: 0 THOU/MM3 (ref 0–0.1)
BUN BLDV-MCNC: 10 MG/DL (ref 7–22)
CALCIUM SERPL-MCNC: 8.3 MG/DL (ref 8.5–10.5)
CHLORIDE BLD-SCNC: 105 MEQ/L (ref 98–111)
CO2: 30 MEQ/L (ref 23–33)
CREAT SERPL-MCNC: 0.5 MG/DL (ref 0.4–1.2)
EOSINOPHIL # BLD: 2.3 %
EOSINOPHILS ABSOLUTE: 0.1 THOU/MM3 (ref 0–0.4)
GFR SERPL CREATININE-BSD FRML MDRD: > 90 ML/MIN/1.73M2
GLUCOSE BLD-MCNC: 88 MG/DL (ref 70–108)
HCT VFR BLD CALC: 40.2 % (ref 37–47)
HEMOGLOBIN: 13.7 GM/DL (ref 12–16)
LYMPHOCYTES # BLD: 31.2 %
LYMPHOCYTES ABSOLUTE: 2 THOU/MM3 (ref 1–4.8)
MCH RBC QN AUTO: 29.8 PG (ref 27–31)
MCHC RBC AUTO-ENTMCNC: 33.9 GM/DL (ref 33–37)
MCV RBC AUTO: 87.7 FL (ref 81–99)
MONOCYTES # BLD: 8.7 %
MONOCYTES ABSOLUTE: 0.6 THOU/MM3 (ref 0.4–1.3)
NUCLEATED RED BLOOD CELLS: 0 /100 WBC
OSMOLALITY CALCULATION: 277.9 MOSMOL/KG (ref 275–300)
PDW BLD-RTO: 14.6 % (ref 11.5–14.5)
PLATELET # BLD: 219 THOU/MM3 (ref 130–400)
PMV BLD AUTO: 8 MCM (ref 7.4–10.4)
POTASSIUM SERPL-SCNC: 4.2 MEQ/L (ref 3.5–5.2)
RBC # BLD: 4.59 MILL/MM3 (ref 4.2–5.4)
RBC # BLD: NORMAL 10*6/UL
SEG NEUTROPHILS: 57.5 %
SEGMENTED NEUTROPHILS ABSOLUTE COUNT: 3.7 THOU/MM3 (ref 1.8–7.7)
SODIUM BLD-SCNC: 140 MEQ/L (ref 135–145)
TROPONIN T: < 0.01 NG/ML
WBC # BLD: 6.4 THOU/MM3 (ref 4.8–10.8)

## 2017-10-22 PROCEDURE — 99284 EMERGENCY DEPT VISIT MOD MDM: CPT

## 2017-10-22 PROCEDURE — A6222 GAUZE <=16 IN NO W/SAL W/O B: HCPCS

## 2017-10-22 PROCEDURE — A6446 CONFORM BAND S W>=3" <5"/YD: HCPCS

## 2017-10-22 PROCEDURE — 80048 BASIC METABOLIC PNL TOTAL CA: CPT

## 2017-10-22 PROCEDURE — 72125 CT NECK SPINE W/O DYE: CPT

## 2017-10-22 PROCEDURE — 71020 XR CHEST STANDARD TWO VW: CPT

## 2017-10-22 PROCEDURE — 2580000003 HC RX 258: Performed by: PHYSICIAN ASSISTANT

## 2017-10-22 PROCEDURE — 73140 X-RAY EXAM OF FINGER(S): CPT

## 2017-10-22 PROCEDURE — 73564 X-RAY EXAM KNEE 4 OR MORE: CPT

## 2017-10-22 PROCEDURE — 70450 CT HEAD/BRAIN W/O DYE: CPT

## 2017-10-22 PROCEDURE — 6370000000 HC RX 637 (ALT 250 FOR IP): Performed by: PHYSICIAN ASSISTANT

## 2017-10-22 PROCEDURE — 84484 ASSAY OF TROPONIN QUANT: CPT

## 2017-10-22 PROCEDURE — 93005 ELECTROCARDIOGRAM TRACING: CPT

## 2017-10-22 PROCEDURE — 85025 COMPLETE CBC W/AUTO DIFF WBC: CPT

## 2017-10-22 PROCEDURE — 36415 COLL VENOUS BLD VENIPUNCTURE: CPT

## 2017-10-22 RX ORDER — ONDANSETRON 4 MG/1
4 TABLET, ORALLY DISINTEGRATING ORAL EVERY 8 HOURS PRN
Qty: 20 TABLET | Refills: 0 | Status: SHIPPED | OUTPATIENT
Start: 2017-10-22 | End: 2018-09-04 | Stop reason: ALTCHOICE

## 2017-10-22 RX ORDER — 0.9 % SODIUM CHLORIDE 0.9 %
1000 INTRAVENOUS SOLUTION INTRAVENOUS ONCE
Status: COMPLETED | OUTPATIENT
Start: 2017-10-22 | End: 2017-10-22

## 2017-10-22 RX ORDER — ACETAMINOPHEN 500 MG
1000 TABLET ORAL ONCE
Status: COMPLETED | OUTPATIENT
Start: 2017-10-22 | End: 2017-10-22

## 2017-10-22 RX ORDER — SODIUM CHLORIDE 9 MG/ML
INJECTION, SOLUTION INTRAVENOUS CONTINUOUS
Status: DISCONTINUED | OUTPATIENT
Start: 2017-10-22 | End: 2017-10-22 | Stop reason: HOSPADM

## 2017-10-22 RX ORDER — BUTALBITAL, ASPIRIN, AND CAFFEINE 50; 325; 40 MG/1; MG/1; MG/1
1 CAPSULE ORAL EVERY 6 HOURS PRN
Qty: 20 CAPSULE | Refills: 0 | Status: SHIPPED | OUTPATIENT
Start: 2017-10-22 | End: 2018-08-31 | Stop reason: ALTCHOICE

## 2017-10-22 RX ADMIN — SODIUM CHLORIDE 1000 ML: 9 INJECTION, SOLUTION INTRAVENOUS at 12:52

## 2017-10-22 RX ADMIN — ACETAMINOPHEN 1000 MG: 500 TABLET ORAL at 12:52

## 2017-10-22 ASSESSMENT — PAIN DESCRIPTION - PAIN TYPE
TYPE: ACUTE PAIN
TYPE: ACUTE PAIN

## 2017-10-22 ASSESSMENT — ENCOUNTER SYMPTOMS
COUGH: 0
NAUSEA: 0
SHORTNESS OF BREATH: 0
VOMITING: 0
WHEEZING: 0
RHINORRHEA: 0
ABDOMINAL PAIN: 0
EYE PAIN: 0
SORE THROAT: 0
BACK PAIN: 0
DIARRHEA: 0
EYE DISCHARGE: 0

## 2017-10-22 ASSESSMENT — PAIN DESCRIPTION - ORIENTATION: ORIENTATION: RIGHT

## 2017-10-22 ASSESSMENT — PAIN SCALES - GENERAL
PAINLEVEL_OUTOF10: 5
PAINLEVEL_OUTOF10: 7
PAINLEVEL_OUTOF10: 7

## 2017-10-22 ASSESSMENT — PAIN DESCRIPTION - LOCATION
LOCATION: FACE
LOCATION: FACE

## 2017-10-22 NOTE — ED NOTES
Sarita [facility] in to apply dermal adhesive to brow laceration.      Augusta Huang RN  10/22/17 2081

## 2017-10-22 NOTE — ED PROVIDER NOTES
[facility]  eMERGENCY dEPARTMENT eNCOUnter          [address] [facility]       Chief Complaint   Patient presents with    Fall    Facial Injury    Abrasion       Nurses Notes reviewed and I agree except as noted in the HPI. HISTORY OF PRESENT ILLNESS    Vivek Florentino is a 47 y.o. female who presents to the Emergency Department for the evaluation of a fall. The patient states she believes she tripped and fell crossing the street to [facility] earlier today, though she is unable to remember the incident. She did hit her head and reports a laceration to the head and abrasions to the right knee and left hand. She also c/o mild lightheadedness. Her tetanus is up to date. She denies being on any anticoagulants. She denies any nausea, vomiting, or neck pain. No further complaints at the time of the initial encounter. Location/Symptom: Fall. Timing/Onset: Today, PTA. Context/Setting: Tripped walking into [facility]. Hit head, doesn't remember what happened. Quality: Unknown. Modifying Factors: None. Severity: Mild to moderate. The HPI was provided by the patient. REVIEW OF SYSTEMS     Review of Systems   Constitutional: Negative for appetite change, chills, fatigue and fever. HENT: Negative for congestion, ear pain, rhinorrhea and sore throat. Eyes: Negative for pain, discharge and visual disturbance. Respiratory: Negative for cough, shortness of breath and wheezing. Cardiovascular: Negative for chest pain, palpitations and leg swelling. Gastrointestinal: Negative for abdominal pain, diarrhea, nausea and vomiting. Genitourinary: Negative for difficulty urinating, dysuria and vaginal discharge. Musculoskeletal: Negative for arthralgias, back pain, joint swelling and neck pain. Skin: Positive for wound. Negative for pallor and rash. Neurological: Positive for light-headedness. Negative for dizziness, syncope, weakness and headaches. Hematological: Negative for adenopathy. tablet, R-0Normal      traMADol (ULTRAM) 50 MG tablet 1-2 tabs BID PRN, Disp-120 tablet, R-0Normal      vitamin D (ERGOCALCIFEROL) 99098 units CAPS capsule Take 1 capsule by mouth once a week for 12 doses, Disp-12 capsule, R-0Normal      docusate sodium (COLACE) 100 MG capsule Take 100 mg by mouth as needed for Constipation      vitamin B-12 (CYANOCOBALAMIN) 500 MCG tablet Take 500 mcg by mouth daily      Cholecalciferol (VITAMIN D) 2000 UNITS CAPS capsule Take by mouth daily       iron sucrose (VENOFER) 20 MG/ML injection Infuse 10 mLs intravenously every 3 days for 5 doses Infuse slowly over at least 5 minutes. , Disp-50 mL, R-0NO PRINT      Multiple Vitamins-Minerals (HAIR SKIN AND NAILS FORMULA PO) Take by mouthHistorical Med             ALLERGIES     is allergic to ibuprofen. FAMILY HISTORY     indicated that her mother is . She indicated that her father is . She indicated that her brother is . family history includes Cancer in her brother and father; Diabetes in her mother; Heart Disease in her mother; High Blood Pressure in her father. SOCIAL HISTORY      reports that she quit smoking about 16 years ago. She has a 23.00 pack-year smoking history. She has never used smokeless tobacco. She reports that she does not drink alcohol or use drugs. PHYSICAL EXAM     INITIAL VITALS:  height is 5' 7\" (1.702 m) and weight is 165 lb (74.8 kg). Her oral temperature is 98.4 °F (36.9 °C). Her blood pressure is 127/76 and her pulse is 74. Her respiration is 14 and oxygen saturation is 100%. Physical Exam   Constitutional: She is oriented to person, place, and time. She appears well-developed and well-nourished. HENT:   Head: Normocephalic. Head is with laceration (Small, superficial to the right lateral eye). Right Ear: External ear normal.   Left Ear: External ear normal.   Eyes: Conjunctivae are normal. Right eye exhibits no discharge. Left eye exhibits no discharge.  No scleral icterus. Neck: Normal range of motion. Neck supple. No JVD present. Cardiovascular: Normal rate, regular rhythm and normal heart sounds. Pulmonary/Chest: Effort normal and breath sounds normal. No stridor. No respiratory distress. She has no decreased breath sounds. She has no wheezes. She has no rhonchi. She has no rales. Abdominal: Soft. She exhibits no distension. Musculoskeletal: Normal range of motion. She exhibits no edema. Right wrist: Normal. She exhibits normal range of motion, no tenderness, no bony tenderness and no swelling. Left wrist: Normal. She exhibits normal range of motion, no tenderness, no bony tenderness and no swelling. Right hip: Normal. She exhibits normal range of motion. Left hip: Normal. She exhibits normal range of motion. Right knee: She exhibits normal range of motion. Left knee: She exhibits normal range of motion. Left hand: She exhibits tenderness. She exhibits normal range of motion, no deformity and no swelling. Normal sensation noted. Normal strength noted. Neurological: She is alert and oriented to person, place, and time. She exhibits normal muscle tone. GCS eye subscore is 4. GCS verbal subscore is 5. GCS motor subscore is 6. Skin: Skin is warm and dry. Abrasion (Right knee and left hand) noted. She is not diaphoretic. No erythema. Psychiatric: She has a normal mood and affect. Her behavior is normal.   Nursing note and vitals reviewed. DIFFERENTIAL DIAGNOSIS:   Abrasion, Contusion, Laceration, Fall. DIAGNOSTIC RESULTS     EKG: All EKG's are interpreted by the Emergency Department Physician who either signs or Co-signs this chart in the absence of a cardiologist.  EKG read and interpreted by myself with comparison to 2-Jan-2016 gives impression of normal sinus rhythm with heart rate of 73; interval 126; QRS 72;QTc 418; axis 51. No STEMI.      RADIOLOGY: non-plain film images(s) such as CT, Ultrasound and MRI are read by the radiologist.  [address] [employer]   Final Result   Prior anterior cervical fusion. Otherwise normal cervical spine. No acute fracture seen. **This report has been created using voice recognition software. It may contain minor errors which are inherent in voice recognition technology. **      Final report electronically signed by Dr. Matt Coyne on 10/22/2017 1:57 PM      CT HEAD WO CONTRAST   Final Result    No evidence of an acute process. No change from prior. **This report has been created using voice recognition software. It may contain minor errors which are inherent in voice recognition technology. **      Final report electronically signed by Dr. Julia Pabon on 10/22/2017 1:55 PM      XR FINGER LEFT (MIN 2 VIEWS)   Final Result   Impression: Soft tissue swelling little finger. No acute fracture. No foreign body. **This report has been created using voice recognition software. It may contain minor errors which are inherent in voice recognition technology. **      Final report electronically signed by Dr. Matt Coyne on 10/22/2017 1:36 PM      XR KNEE RIGHT (MIN 4 VIEWS)   Final Result   No acute fractures seen. **This report has been created using voice recognition software. It may contain minor errors which are inherent in voice recognition technology. **            Final report electronically signed by Dr. Matt Coyne on 10/22/2017 1:34 PM      XR CHEST STANDARD (2 VW)   Final Result   Normal chest. No acute findings. Prior anterior cervical fusion. **This report has been created using voice recognition software. It may contain minor errors which are inherent in voice recognition technology. **      Final report electronically signed by Dr. Matt Coyne on 10/22/2017 1:27 PM          LABS:   Labs Reviewed   CBC WITH AUTO DIFFERENTIAL - Abnormal; Notable for the following:        Result Value    RDW 14.6 (*) All other components within normal limits   BASIC METABOLIC PANEL - Abnormal; Notable for the following:     Calcium 8.3 (*)     All other components within normal limits   ANION GAP - Abnormal; Notable for the following: Anion Gap 5.0 (*)     All other components within normal limits   TROPONIN   GLOMERULAR FILTRATION RATE, ESTIMATED   OSMOLALITY   URINE RT REFLEX TO CULTURE       EMERGENCY DEPARTMENT COURSE:   Vitals:    Vitals:    10/22/17 1222 10/22/17 1411   BP: (!) 149/99 127/76   Pulse: 71 74   Resp: 14 14   Temp: 98.4 °F (36.9 °C)    TempSrc: Oral    SpO2: 99% 100%   Weight: 165 lb (74.8 kg)    Height: 5' 7\" (1.702 m)        12:24 PM: The patient was seen and evaluated. CRITICAL CARE:   None     CONSULTS:  None    PROCEDURES:  Lac Repair  Date/Time: 10/22/2017 4:44 PM  Performed by: Cheri Paul  Authorized by: Salma TRINIDAD     Consent:     Consent obtained:  Verbal    Consent given by:  Patient    Risks discussed:  Infection, pain, retained foreign body, tendon damage, poor cosmetic result, need for additional repair, nerve damage, poor wound healing and vascular damage    Alternatives discussed:  No treatment  Anesthesia (see MAR for exact dosages):      Anesthesia method:  None  Laceration details:     Location:  Face    Face location:  R eyebrow    Length (cm):  2  Repair type:     Repair type:  Simple  Pre-procedure details:     Preparation:  Patient was prepped and draped in usual sterile fashion  Exploration:     Hemostasis achieved with:  Direct pressure    Wound extent: no areolar tissue violation noted, no fascia violation noted, no foreign bodies/material noted, no muscle damage noted, no nerve damage noted, no tendon damage noted, no underlying fracture noted and no vascular damage noted    Treatment:     Area cleansed with:  Blank-Adilia    Amount of cleaning:  Standard    Visualized foreign bodies/material removed: no    Skin repair:     Repair method:  Tissue adhesive  Approximation:     Approximation:  Close  Post-procedure details:     Patient tolerance of procedure: Tolerated well, no immediate complications          FINAL IMPRESSION      1. Injury of head, initial encounter    2. Facial laceration, initial encounter    3. Contusion of right knee, initial encounter    4. Contusion of finger of left hand, unspecified finger, initial encounter          DISPOSITION/PLAN   Discharge      PATIENT REFERRED TO:  Marc Merida Jordan  [phone]    In 2 days        DISCHARGE MEDICATIONS:  Discharge Medication List as of 10/22/2017  2:49 PM      START taking these medications    Details   butalbital-aspirin-caffeine (FIORINAL) -40 MG per capsule Take 1 capsule by mouth every 6 hours as needed for Headaches, Disp-20 capsule, R-0Print      ondansetron (ZOFRAN ODT) 4 MG disintegrating tablet Take 1 tablet by mouth every 8 hours as needed for Nausea, Disp-20 tablet, R-0Print             (Please note that portions of this note were completed with a voice recognition program.  Efforts were made to edit the dictations but occasionally words are mis-transcribed.)    Scribe:  Ana Sher 10/22/17 12:24 PM Scribing for and in the presence of Yony Liao PA-C    Signed by: Pedro Jernigan, 10/22/17 4:44 PM    Provider:  I personally performed the services described in the documentation, reviewed and edited the documentation which was dictated to the scribe in my presence, and it accurately records my words and actions.     Yony Liao PA-C 10/22/17 4:44 PM        BENNETT Cannon  10/22/17 0742

## 2017-10-22 NOTE — ED TRIAGE NOTES
Presents with laceration to right brow, headache, and abrasions to right knee and left hand after falling around 1200 today. Pt was walking across street to [place] and fell forward. Pt does not think she lost consciousness prior to fall. She does c/o light-headedness at this time. Pt alert and oriented. Speech is clear and appropriate. Bleeding controlled.

## 2017-10-23 ENCOUNTER — HOSPITAL ENCOUNTER (OUTPATIENT)
Dept: PHYSICAL THERAPY | Age: 52
Setting detail: THERAPIES SERIES
Discharge: HOME | End: 2017-10-23
Payer: COMMERCIAL

## 2017-10-23 LAB
EKG ATRIAL RATE: 73 BPM
EKG P AXIS: 76 DEGREES
EKG P-R INTERVAL: 126 MS
EKG Q-T INTERVAL: 380 MS
EKG QRS DURATION: 72 MS
EKG QTC CALCULATION (BAZETT): 418 MS
EKG R AXIS: 51 DEGREES
EKG T AXIS: 59 DEGREES
EKG VENTRICULAR RATE: 73 BPM

## 2017-10-23 PROCEDURE — 97113 AQUATIC THERAPY/EXERCISES: CPT

## 2017-10-23 ASSESSMENT — PAIN SCALES - GENERAL: PAINLEVEL_OUTOF10: 9

## 2017-10-23 NOTE — PROGRESS NOTES
x2 laps each direction using rail today (Previous session walked with noodle.)  LE Streches: Calf stretch at 4' steps 3x 15 seconds Bilateral   Static Strengthening UEs  Shoulder Flex: x10 in 4'10\"  Shoulder ABD/ADD: x10 in 4'10\" - pain in right shoulder today  Shoulder Horiz ABD/ADD: x10 in 4'10\"  Rows: x10 in 4'10\"        Static Strengthening LEs  Heel/Toe Raises: x15 each  Squats: x15  Marching: x1 minute with 1 UE   Hamstring Curls: x15 bilateral   4-Way Hip: 3-way x15 bilateral - cues needed for posture  Lunges (F,L,R): Forward x10 bilateral - pain at left knee with flexion  Dynamic Strengthening LE  Step-Ups (F,L,R): Forward and lateral x10 bilateral at 4' steps - could only do 10x instead of 15x due to left knee pain  Deep H2O LE  Hang: x3 minutes with noodle in 4'10\" at end of session. Hip Flex/Ext: on noodle 15x in 4'10\" - limited range  Hip ABD/ADD: on noodle 15x in 4'10\" - limited range      Activity Tolerance:  Activity Tolerance: Patient Tolerated treatment well  Activity Tolerance: Pain decreased to 6/10 at end of session. Assessment: Body structures, Functions, Activity limitations: Decreased functional mobility , Decreased ROM, Decreased strength, Decreased endurance, Decreased sensation, Decreased balance, Decreased high-level IADLs, Decreased coordination  Assessment: Patient limiting range of motion and repetitions due to left knee pain. Discussed safety with patient to to use her walker or cane. Occasional cues for posture correction. Pain decreased by end of session. Prognosis: Good  Discharge Recommendations: Continue to assess pending progress    Patient Education:  Patient Education: Continue with current HEP, Monitor pain/fatigue, Use walker/cane for safety and fall prevention.      Plan:  Times per week: 2x/week  Plan weeks: 6 weeks  Specific instructions for Next Treatment: Ther ex, aquatics, neuro ex for core/UE/LE strengthening, balance, decreased pain, gait  Current Treatment Recommendations: Strengthening, ROM, Balance Training, Functional Mobility Training, Endurance Training, Gait Training, Stair training, Neuromuscular Re-education, Pain Management, Home Exercise Program, Safety Education & Training, Patient/Caregiver Education & Training, Equipment Evaluation, Education, & procurement  Plan Comment: Continue with plan of care    Goals:  Patient goals : Walk normal, floor transfer    Short term goals  Time Frame for Short term goals: 4 weeks  Short term goal 1: Pt to demo gait with device as needed x100 feet with each swing foot passing stance foot for improved safety with walking. Short term goal 2: Pt to demo up/down 4 steps with 1 handrail with safe technique with modified independence for safety with getting in/out of the pool. Short term goal 3: Pt to demo left knee extension to flexion  AROM improved from lacking 14 to 90 deg to lacking 6 to 120 deg for greater ease with walking and stair climbing. Short term goal 4: Pt to demo left ankle dorsiflexion AROM improved from lacking 8 to lacking 2 deg for greater ease with clearing foot for safety with walking. Short term goal 5: Pt to demo right shoulder abduction AROM improved from 58 to 96 deg for greater ease with dressing and grooming. Long term goals  Time Frame for Long term goals : 6 weeks  Long term goal 1: Pt to demo improved Tinetti score from 12/28 to 17/28 for reduced risk of falling in the home and community. Anna Alford.  Randolph Otero, [facility] Kenny Coronel, 10/23/2017

## 2017-10-24 NOTE — TELEPHONE ENCOUNTER
Patient would like a refill on the following:  Pharmacy verified. HYDROcodone-acetaminophen (NORCO) 7.5-325 MG per tablet [[id number]]   Order Details   Dose: 1 tablet Route: Oral Frequency: 3 TIMES DAILY PRN for Pain   Dispense Quantity:  90 tablet Refills:  0 Fills remaining:  --           Sig: Take 1 tablet by mouth 3 times daily as needed for Pain .          Written Date:  10/02/17 Expiration Date:  17     Start Date:  10/04/17 End Date:  17     Ordering Provider:  -- Authorizing Provider:  Joana Sung MD Ordering User:  Joana Sung MD          Diagnosis Association: Incomplete quadriplegia at C5-C8 level ([facility] [address] (G82.54); Neuropathy (HCC) (G62.9);  Chronic midline low back pain, with sciatica presence unspecified (M54.5 , G89.29)      Original Order:  HYDROcodone-acetaminophen (NORCO) 7.5-325 MG per tablet [[id number]]      Pharmacy:  [facility] [address] [phone]         traMADol (ULTRAM) 50 MG tablet [[id number]]   Order Details   Dose, Route, Frequency: As Directed    Dispense Quantity:  120 tablet Refills:  0 Fills remaining:  --           Si-2 tabs BID PRN

## 2017-10-25 ENCOUNTER — TELEPHONE (OUTPATIENT)
Dept: PHYSICAL MEDICINE AND REHAB | Age: 52
End: 2017-10-25

## 2017-10-25 ENCOUNTER — HOSPITAL ENCOUNTER (OUTPATIENT)
Dept: PHYSICAL THERAPY | Age: 52
Setting detail: THERAPIES SERIES
Discharge: HOME | End: 2017-10-25
Payer: COMMERCIAL

## 2017-10-25 PROCEDURE — 97110 THERAPEUTIC EXERCISES: CPT

## 2017-10-25 PROCEDURE — G8978 MOBILITY CURRENT STATUS: HCPCS

## 2017-10-25 PROCEDURE — G8979 MOBILITY GOAL STATUS: HCPCS

## 2017-10-25 RX ORDER — TRAMADOL HYDROCHLORIDE 50 MG/1
50 TABLET ORAL 2 TIMES DAILY PRN
Qty: 120 TABLET | Refills: 0 | Status: SHIPPED | OUTPATIENT
Start: 2017-10-25 | End: 2017-11-08 | Stop reason: SDUPTHER

## 2017-10-25 ASSESSMENT — PAIN DESCRIPTION - ORIENTATION: ORIENTATION: RIGHT;LEFT

## 2017-10-25 ASSESSMENT — PAIN DESCRIPTION - PAIN TYPE: TYPE: CHRONIC PAIN

## 2017-10-25 ASSESSMENT — PAIN SCALES - GENERAL: PAINLEVEL_OUTOF10: 8

## 2017-10-25 NOTE — PROGRESS NOTES
note               Subjective:  Patient assessed for rehabilitation services?: Yes  Comments: 9/29/17 Dr. Mariam Tran, 10/23/17 Dr. Estelle Lopez- 11/14/17. Subjective: Patient reports that she fell on Sunday, 10/22/17 and has abrasions on left hand, knees and laceration right eye and eyebrow region. Patient did go to ER. Patient not sure how she fell. She has been noting increased weakness in last 2 months. Patient now using cane or standard walker. Patient would like to have 4 wheeled walker. Patient had to reschedule appointment with [facility], for 11/14/17. Patient has not noted much change from pool. For about an hour after therapeutic pool she notes less pain in back and legs for about an hour. She did have epidural but did not notice any change from it. Patient gets wore out easily with exercises. Pain:  Patient Currently in Pain: Yes  Pain Assessment: 0-10  Pain Level: 8  Pain Type: Chronic pain  Pain Location: Leg, Hip, Shoulder, Back  Pain Orientation: Right, Left  Pain Radiating Towards: bilaeral lumbar, buttocks and legs      Objective         TINETTI BALANCE TEST    BALANCE Patient is seated in a hard, armless chair   1 SITTING BALANCE 1 - Steady, safe   2. RISES FROM CHAIR 1 - Able, uses arms to help   3. ATTEMPTS TO RISE 2 - Able to rise, 1 attempt   4. IMMEDIATE STANDING BALANCE (FIRST 5 SECONDS) 2 - Steady without walker or other support   5. STANDING BALANCE 2 - Narrow stance without support   6. NUDGED 1 - Staggers, grabs, catches   7. EYES CLOSED 0 - Unsteady   8. TURNING 360 DEGREES 0 - Discontinuous steps and 0 - Unsteady (grabs,staggers)   9. SITTING DOWN 1 - Uses arms or not a smooth motion   BALANCE SCORE 10/16       GAIT SECTION Patient stands with therapist, walks across room (+/- aids), fist at usual pace, then at rapid pace. 1.  INDICATION OF GAIT (Immediately after told to \"go\" 1 - No hesitancy   2.   STEP LENGTH AND HEIGHT 1 - Step through Right and 1 - ease with dressing and grooming. GOAL NOT MET shoulder flexion 110 degrees, abdutions 60 degrees. continue goal..    Long term goals  Time Frame for Long term goals : 6 weeks  Long term goal 1: Pt to andres improved Tinetti score from 12/28 to 17/28 for reduced risk of falling in the home and community. GOAL MET Tinetti 19/28  (gait 9/12 and [id number])  Revise goal Tinetti to 21/28. PT G-Codes  Functional Assessment Tool Used: Tinetti  Score: 19/28  Functional Limitation: Mobility: Walking and moving around  Mobility: Walking and Moving Around Current Status (): At least 20 percent but less than 40 percent impaired, limited or restricted  Mobility: Walking and Moving Around Goal Status ():  At least 20 percent but less than 40 percent impaired, limited or restricted    Alexis Busch, [address] [facility]

## 2017-11-03 ENCOUNTER — HOSPITAL ENCOUNTER (OUTPATIENT)
Dept: PHYSICAL THERAPY | Age: 52
Setting detail: THERAPIES SERIES
Discharge: HOME | End: 2017-11-03
Payer: COMMERCIAL

## 2017-11-03 PROCEDURE — 97110 THERAPEUTIC EXERCISES: CPT

## 2017-11-03 ASSESSMENT — PAIN DESCRIPTION - ORIENTATION: ORIENTATION: RIGHT;LEFT

## 2017-11-03 ASSESSMENT — PAIN DESCRIPTION - PAIN TYPE: TYPE: CHRONIC PAIN

## 2017-11-03 ASSESSMENT — PAIN DESCRIPTION - LOCATION: LOCATION: LEG;FINGER (COMMENT WHICH ONE)

## 2017-11-03 ASSESSMENT — PAIN SCALES - GENERAL: PAINLEVEL_OUTOF10: 8

## 2017-11-03 NOTE — PROGRESS NOTES
for safety with getting in/out of the pool. GOAL NOT MET Patient uses bilateral handrails with non reciprocal pattern. Reporting of left knee pain. rightknee pain also from abrasion on knee. Short term goal 3: Pt to demo left knee extension to flexion  AROM improved from lacking 14 to 90 deg to lacking 6 to 120 deg for greater ease with walking and stair climbing. GOAL NOT MET left knee ROM 15 degrees to 110 degrees. Short term goal 4: Pt to demo left ankle dorsiflexion AROM improved from lacking 8 to lacking 2 deg for greater ease with clearing foot for safety with walking. GOAL MET left ankle ROM to 10 degrees past neutral now and noting improved heelstrike during gait. No new goal   Short term goal 5: Pt to demo right shoulder abduction AROM improved from 58 to 96 deg for greater ease with dressing and grooming. GOAL NOT MET shoulder flexion 110 degrees, abdutions 60 degrees. continue goal..    Long term goals  Time Frame for Long term goals : 6 weeks  Long term goal 1: Pt to demo improved Tinetti score from 12/28 to 17/28 for reduced risk of falling in the home and community. GOAL MET Tinetti 19/28  (gait 9/12 and [id number])  Revise goal Tinetti to 21/28.           Michelle Cano  [id number]

## 2017-11-06 ENCOUNTER — HOSPITAL ENCOUNTER (OUTPATIENT)
Dept: PHYSICAL THERAPY | Age: 52
Setting detail: THERAPIES SERIES
End: 2017-11-06
Payer: COMMERCIAL

## 2017-11-08 ENCOUNTER — OFFICE VISIT (OUTPATIENT)
Dept: FAMILY MEDICINE CLINIC | Age: 52
End: 2017-11-08
Payer: COMMERCIAL

## 2017-11-08 VITALS
TEMPERATURE: 98 F | DIASTOLIC BLOOD PRESSURE: 70 MMHG | HEART RATE: 84 BPM | WEIGHT: 165.4 LBS | HEIGHT: 67 IN | SYSTOLIC BLOOD PRESSURE: 128 MMHG | BODY MASS INDEX: 25.96 KG/M2 | OXYGEN SATURATION: 98 %

## 2017-11-08 DIAGNOSIS — G82.54 INCOMPLETE QUADRIPLEGIA AT C5-C8 LEVEL (HCC): ICD-10-CM

## 2017-11-08 DIAGNOSIS — M54.5 CHRONIC MIDLINE LOW BACK PAIN, WITH SCIATICA PRESENCE UNSPECIFIED: ICD-10-CM

## 2017-11-08 DIAGNOSIS — J06.9 VIRAL URI: Primary | ICD-10-CM

## 2017-11-08 DIAGNOSIS — R05.9 COUGH: ICD-10-CM

## 2017-11-08 DIAGNOSIS — G62.9 NEUROPATHY: ICD-10-CM

## 2017-11-08 DIAGNOSIS — G89.29 CHRONIC MIDLINE LOW BACK PAIN, WITH SCIATICA PRESENCE UNSPECIFIED: ICD-10-CM

## 2017-11-08 PROCEDURE — 1036F TOBACCO NON-USER: CPT | Performed by: FAMILY MEDICINE

## 2017-11-08 PROCEDURE — G8427 DOCREV CUR MEDS BY ELIG CLIN: HCPCS | Performed by: FAMILY MEDICINE

## 2017-11-08 PROCEDURE — G8417 CALC BMI ABV UP PARAM F/U: HCPCS | Performed by: FAMILY MEDICINE

## 2017-11-08 PROCEDURE — G8484 FLU IMMUNIZE NO ADMIN: HCPCS | Performed by: FAMILY MEDICINE

## 2017-11-08 PROCEDURE — 3014F SCREEN MAMMO DOC REV: CPT | Performed by: FAMILY MEDICINE

## 2017-11-08 PROCEDURE — 3017F COLORECTAL CA SCREEN DOC REV: CPT | Performed by: FAMILY MEDICINE

## 2017-11-08 PROCEDURE — 99213 OFFICE O/P EST LOW 20 MIN: CPT | Performed by: FAMILY MEDICINE

## 2017-11-08 RX ORDER — BROMPHENIRAMINE MALEATE, PSEUDOEPHEDRINE HYDROCHLORIDE, AND DEXTROMETHORPHAN HYDROBROMIDE 2; 30; 10 MG/5ML; MG/5ML; MG/5ML
5 SYRUP ORAL 4 TIMES DAILY PRN
Qty: 120 ML | Refills: 0 | Status: SHIPPED | OUTPATIENT
Start: 2017-11-08 | End: 2018-02-06

## 2017-11-08 RX ORDER — HYDROCODONE BITARTRATE AND ACETAMINOPHEN 7.5; 325 MG/1; MG/1
1 TABLET ORAL EVERY 8 HOURS PRN
COMMUNITY
End: 2017-12-11 | Stop reason: SDUPTHER

## 2017-11-08 RX ORDER — HYDROCODONE BITARTRATE AND ACETAMINOPHEN 7.5; 325 MG/1; MG/1
1 TABLET ORAL 3 TIMES DAILY PRN
Qty: 90 TABLET | Refills: 0 | Status: SHIPPED | OUTPATIENT
Start: 2017-11-08 | End: 2017-12-08

## 2017-11-08 RX ORDER — M-VIT,TX,IRON,MINS/CALC/FOLIC 27MG-0.4MG
1 TABLET ORAL DAILY
COMMUNITY

## 2017-11-08 RX ORDER — GABAPENTIN 800 MG/1
800 TABLET ORAL 2 TIMES DAILY
COMMUNITY
End: 2017-12-11 | Stop reason: SDUPTHER

## 2017-11-08 NOTE — PATIENT INSTRUCTIONS
from your lungs by breathing deeply and coughing. · Gargle with warm salt water once an hour. This can help reduce swelling and throat pain. Use 1 teaspoon of salt mixed in 1 cup of warm water. · Do not smoke or allow others to smoke around you. If you need help quitting, talk to your doctor about stop-smoking programs and medicines. These can increase your chances of quitting for good. To avoid spreading the virus  · Cough or sneeze into a tissue. Then throw the tissue away. · If you don't have a tissue, use your hand to cover your cough or sneeze. Then clean your hand. You can also cough into your sleeve. · Wash your hands often. Use soap and warm water. Wash for 15 to 20 seconds each time. · If you don't have soap and water near you, you can clean your hands with alcohol wipes or gel. When should you call for help? Call your doctor now or seek immediate medical care if:  · You have a new or higher fever. · Your fever lasts more than 48 hours. · You have trouble breathing. · You have a fever with a stiff neck or a severe headache. · You are sensitive to light. · You feel very sleepy or confused. Watch closely for changes in your health, and be sure to contact your doctor if:  · You do not get better as expected. Where can you learn more? Go to [email or web address] org and sign in to your [employer] account. Enter M932 in the [employer] box to learn more about \"Viral Respiratory Infection: Care Instructions. \"     If you do not have an account, please click on the \"Sign Up Now\" link. Current as of: March 25, 2017  Content Version: 11.3  © 4915-9247 [employer]. Care instructions adapted under license by [facility] ([facility]). If you have questions about a medical condition or this instruction, always ask your healthcare professional. [address] any warranty or liability for your use of this information.

## 2017-11-08 NOTE — PROGRESS NOTES
place, and time. Psychiatric: She has a normal mood and affect. Her behavior is normal.   Vitals reviewed. Impression/Plan:  1. Viral URI  Likely viral URI. No antibiotic indicated  -start brompheniramine-pseudoephedrine-DM (BROMFED DM) 30-2-10 MG/5ML syrup; Take 5 mLs by mouth 4 times daily as needed for Congestion or Cough  Dispense: 120 mL; Refill: 0    2. Cough  As above  - brompheniramine-pseudoephedrine-DM (BROMFED DM) 30-2-10 MG/5ML syrup; Take 5 mLs by mouth 4 times daily as needed for Congestion or Cough  Dispense: 120 mL; Refill: 0    Cough may take 4 weeks to resolve  Rest and hydration  Symptomatic management. They voiced understanding. All questions answered. They agreed with treatment plan. Educational materials given - see patient instructions. Discussed use, benefit, and side effects of prescribed medications. Reviewed health maintenance. Return if symptoms worsen or fail to improve.        Electronically signed by Irvin Eli MD on 11/8/2017 at 11:19 AM

## 2017-11-10 ENCOUNTER — HOSPITAL ENCOUNTER (OUTPATIENT)
Dept: PHYSICAL THERAPY | Age: 52
Setting detail: THERAPIES SERIES
Discharge: HOME | End: 2017-11-10
Payer: COMMERCIAL

## 2017-11-10 PROCEDURE — 97110 THERAPEUTIC EXERCISES: CPT

## 2017-11-10 ASSESSMENT — PAIN DESCRIPTION - ORIENTATION: ORIENTATION: RIGHT;LEFT

## 2017-11-10 ASSESSMENT — PAIN SCALES - GENERAL: PAINLEVEL_OUTOF10: 7

## 2017-11-10 ASSESSMENT — PAIN DESCRIPTION - PAIN TYPE: TYPE: CHRONIC PAIN

## 2017-11-10 ASSESSMENT — PAIN DESCRIPTION - LOCATION: LOCATION: LEG;HAND
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
## 2017-11-13 ENCOUNTER — HOSPITAL ENCOUNTER (OUTPATIENT)
Dept: PHYSICAL THERAPY | Age: 52
Setting detail: THERAPIES SERIES
Discharge: HOME | End: 2017-11-13
Payer: COMMERCIAL

## 2017-11-13 PROCEDURE — 97113 AQUATIC THERAPY/EXERCISES: CPT

## 2017-11-13 ASSESSMENT — PAIN SCALES - GENERAL: PAINLEVEL_OUTOF10: 8

## 2017-11-13 NOTE — PROGRESS NOTES
Recommendations: Strengthening, ROM, Balance Training, Functional Mobility Training, Endurance Training, Gait Training, Stair training, Neuromuscular Re-education, Pain Management, Home Exercise Program, Safety Education & Training, Patient/Caregiver Education & Training, Equipment Evaluation, Education, & procurement  Plan Comment: Continue with plan of care    Goals:  Patient goals : Walk normal, floor transfer     Short term goals  Time Frame for Short term goals: 4 weeks  Short term goal 2: Pt to demo up/down 4 steps with 1 handrail with safe technique with modified independence for safety with getting in/out of the pool. Short term goal 3: Pt to demo left knee extension to flexion  AROM improved from lacking 14 to 90 deg to lacking 6 to 120 deg for greater ease with walking and stair climbing. Short term goal 5: Pt to demo right shoulder abduction AROM improved from 58 to 96 deg for greater ease with dressing and grooming. Long term goals  Time Frame for Long term goals : 6 weeks  Long term goal 1: Pt to demo improved Tinetti score from 19/28 to 21/28 for reduced risk of falling in the home and community. Jordan Gruber.  Victoria Troy, [address], 11/13/2017

## 2017-11-14 ENCOUNTER — OFFICE VISIT (OUTPATIENT)
Dept: PHYSICAL MEDICINE AND REHAB | Age: 52
End: 2017-11-14
Payer: COMMERCIAL

## 2017-11-14 VITALS
HEART RATE: 75 BPM | HEIGHT: 67 IN | DIASTOLIC BLOOD PRESSURE: 75 MMHG | WEIGHT: 165.34 LBS | SYSTOLIC BLOOD PRESSURE: 125 MMHG | BODY MASS INDEX: 25.95 KG/M2

## 2017-11-14 DIAGNOSIS — G89.4 CHRONIC PAIN SYNDROME: ICD-10-CM

## 2017-11-14 DIAGNOSIS — G89.29 CHRONIC BILATERAL LOW BACK PAIN, WITH SCIATICA PRESENCE UNSPECIFIED: ICD-10-CM

## 2017-11-14 DIAGNOSIS — M48.062 SPINAL STENOSIS OF LUMBAR REGION WITH NEUROGENIC CLAUDICATION: ICD-10-CM

## 2017-11-14 DIAGNOSIS — M25.532 LEFT WRIST PAIN: ICD-10-CM

## 2017-11-14 DIAGNOSIS — M47.816 LUMBAR FACET ARTHROPATHY: ICD-10-CM

## 2017-11-14 DIAGNOSIS — G89.29 CHRONIC MIDLINE LOW BACK PAIN, WITH SCIATICA PRESENCE UNSPECIFIED: ICD-10-CM

## 2017-11-14 DIAGNOSIS — G82.54 INCOMPLETE QUADRIPLEGIA AT C5-C8 LEVEL (HCC): ICD-10-CM

## 2017-11-14 DIAGNOSIS — G62.9 NEUROPATHY: ICD-10-CM

## 2017-11-14 DIAGNOSIS — M54.5 CHRONIC BILATERAL LOW BACK PAIN, WITH SCIATICA PRESENCE UNSPECIFIED: ICD-10-CM

## 2017-11-14 DIAGNOSIS — M47.816 SPONDYLOSIS OF LUMBAR REGION WITHOUT MYELOPATHY OR RADICULOPATHY: Primary | ICD-10-CM

## 2017-11-14 DIAGNOSIS — M54.16 LUMBAR RADICULITIS: ICD-10-CM

## 2017-11-14 DIAGNOSIS — M54.5 CHRONIC MIDLINE LOW BACK PAIN, WITH SCIATICA PRESENCE UNSPECIFIED: ICD-10-CM

## 2017-11-14 PROCEDURE — G8484 FLU IMMUNIZE NO ADMIN: HCPCS | Performed by: NURSE PRACTITIONER

## 2017-11-14 PROCEDURE — 3014F SCREEN MAMMO DOC REV: CPT | Performed by: NURSE PRACTITIONER

## 2017-11-14 PROCEDURE — 1036F TOBACCO NON-USER: CPT | Performed by: NURSE PRACTITIONER

## 2017-11-14 PROCEDURE — G8427 DOCREV CUR MEDS BY ELIG CLIN: HCPCS | Performed by: NURSE PRACTITIONER

## 2017-11-14 PROCEDURE — G8417 CALC BMI ABV UP PARAM F/U: HCPCS | Performed by: NURSE PRACTITIONER

## 2017-11-14 PROCEDURE — 99213 OFFICE O/P EST LOW 20 MIN: CPT | Performed by: NURSE PRACTITIONER

## 2017-11-14 PROCEDURE — 3017F COLORECTAL CA SCREEN DOC REV: CPT | Performed by: NURSE PRACTITIONER

## 2017-11-14 ASSESSMENT — ENCOUNTER SYMPTOMS: BACK PAIN: 1

## 2017-11-14 NOTE — PROGRESS NOTES
[facility]  [address]  Dept: [phone]  Dept Fax: [phone]: [phone]    Visit Date: 11/14/2017    Functionality Assessment/Goals Worksheet     On a scale of 0 (Does not Interfere) to 10 (Completely Interferes)     1. Which number describes how during the past week pain has interfered with       the following:  A. General Activity:  9  B. Mood: 10  C. Walking Ability:  9  D. Normal Work (Includes both work outside the home and housework):  10  E. Relations with Other People:   8  F. Sleep:   3  G. Enjoyment of Life:   10    2. Patient Prefers to Take their Pain Medications:     [x]  On a regular basis   [x]  Only when necessary    []  Does not take pain medications    3. What are the Patient's Goals/Expectations for Visiting Pain Management? [x]  Learn about my pain    [x]  Receive Medication   [x]  Physical Therapy     []  Treat Depression   [x]  Receive Injections    []  Treat Sleep   [x]  Deal with Anxiety and Stress   []  Treat Opoid Dependence/Addiction   []  Other:      HPI:   Steve Leija is a 47 y.o. female is here today for    Chief Complaint:  Leg pain, lower back pain, neck pain, left hand pain     HPI   FU LESI # 1 from 10/16/2017. Reports no relief from LESI #1, \"I feel like I did not have anything done\". Main complaint remains bilateral leg pain and and feet- burning, fire, numbness pain- constant. Continues to have lower back pain. Remains 60% bothersome in legs and 40% across the back. Medications reviewed. Patient denies  side effects with medications. Patient states she is  taking medications as prescribed. She denies receiving pain medications from other sources. She had 1 ER visit since last visit for fall. Pain scale with out pain medications is 10/10. Pain scale with pain medications is  5-8/10.   Last dose of Norco was 11/14/2017, Ultram was 11/13/2017   Drug screen reviewed from subarticular stenosis. There is bilateral foraminal stenosis, mild/moderate.       Mild bulging disc material is associated at the L5-S1 level with a superimposed central and right paracentral disc protrusion, small. There is mild facet degeneration bilaterally. There is no compromise of the thecal sac, though effacement of the ventral    epidural fat is demonstrated. There is some mild stenosis of the right lateral recess, potentially compromising the intracanal right S1 neural elements, which appears slightly posteriorly deviated due to the disc protrusion. There is only very mild    foraminal stenosis bilaterally.       Surrounding tissues are nonspecific.           Impression   NO EVIDENCE OF ACUTE ABNORMALITY.       DEGENERATIVE DISC AND FACET CHANGES ARE PRESENT, MOST IMPRESSIVELY AT THE L3-4 AND L4-5 AND L5-S1 LEVELS. SECONDARY STENOSIS OF THESE REGIONS PRODUCED, MOST IMPRESSIVELY INVOLVING THE LEFT L3-4 FORAMEN, MODERATE SEVERE TO SEVERE, THE RIGHT MORE THAN LEFT    LATERAL RECESSES AT L4-5, MODERATE, WITH BILATERAL L4-5 FORAMINAL STENOSIS, MILD/MODERATE, AND MILD RIGHT LATERAL RECESS STENOSIS AT L5-S1. THESE ARE POSSIBLE EXPLANATION FOR BILATERAL LOWER EXTREMITY RADICULOPATHY, AS DISCUSSED. The patient is allergic to ibuprofen. Past Medical History  Mason Casanova  has a past medical history of Depression; DM2 (diabetes mellitus, type 2) ([address] Endometriosis; Esophageal stricture; Female bladder prolapse; Former smoker; History of gastric bypass; History of hypertension; Hypotension; Iron deficiency anemia; MDRO (multiple drug resistant organisms) resistance; Obesity; Pulmonary nodule; Spinal cord injury, C5-C7 ([address] and Vitamin D deficiency. Past Surgical History  The patient  has a past surgical history that includes Gastric bypass surgery (); Hysterectomy ();  section, low transverse; Cholecystectomy; Tonsillectomy; Foot surgery (3/3/15); bladder suspension (11);  Upper and swelling. Right foot: There is tenderness and swelling. Left foot: There is decreased range of motion, tenderness and swelling. Neurological: She is alert and oriented to person, place, and time. She has normal strength. She is not disoriented. She displays no atrophy. No cranial nerve deficit or sensory deficit. She exhibits normal muscle tone. She displays a negative Romberg sign. Coordination and gait normal. She displays no Babinski's sign on the right side. Reflex Scores:       Tricep reflexes are 1+ on the right side and 1+ on the left side. Bicep reflexes are 1+ on the right side and 1+ on the left side. Brachioradialis reflexes are 1+ on the right side and 1+ on the left side. Patellar reflexes are 2+ on the right side and 2+ on the left side. Achilles reflexes are 2+ on the right side and 2+ on the left side. SLR -  Motor 3/5 LUE LLE RUE 4/5 RLE 4/5   Skin: Skin is warm. No rash noted. She is not diaphoretic. No erythema. No pallor. Psychiatric: She has a normal mood and affect. Her speech is normal and behavior is normal. Judgment and thought content normal. Her mood appears not anxious. Her affect is not angry, not blunt, not labile and not inappropriate. She is not agitated, not aggressive, not hyperactive, not slowed, not withdrawn, not actively hallucinating and not combative. Thought content is not paranoid and not delusional. Cognition and memory are normal. Cognition and memory are not impaired. She does not express impulsivity or inappropriate judgment. She does not exhibit a depressed mood. She expresses no homicidal and no suicidal ideation. She expresses no suicidal plans and no homicidal plans. She exhibits normal recent memory and normal remote memory. She is attentive. Nursing note and vitals reviewed. Assessment:     1. Spondylosis of lumbar region without myelopathy or radiculopathy    2.  Chronic bilateral low back pain, with

## 2017-11-16 ENCOUNTER — HOSPITAL ENCOUNTER (OUTPATIENT)
Dept: PHYSICAL THERAPY | Age: 52
Setting detail: THERAPIES SERIES
Discharge: HOME | End: 2017-11-16
Payer: COMMERCIAL

## 2017-11-16 PROCEDURE — 97110 THERAPEUTIC EXERCISES: CPT

## 2017-11-16 ASSESSMENT — PAIN SCALES - GENERAL: PAINLEVEL_OUTOF10: 7
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
## 2017-11-20 ENCOUNTER — HOSPITAL ENCOUNTER (OUTPATIENT)
Dept: PHYSICAL THERAPY | Age: 52
Setting detail: THERAPIES SERIES
Discharge: HOME | End: 2017-11-20
Payer: COMMERCIAL

## 2017-11-20 PROCEDURE — 97113 AQUATIC THERAPY/EXERCISES: CPT

## 2017-11-20 ASSESSMENT — PAIN DESCRIPTION - PAIN TYPE: TYPE: CHRONIC PAIN

## 2017-11-20 ASSESSMENT — PAIN DESCRIPTION - LOCATION: LOCATION: LEG;HAND

## 2017-11-20 ASSESSMENT — PAIN SCALES - GENERAL: PAINLEVEL_OUTOF10: 6

## 2017-11-20 ASSESSMENT — PAIN DESCRIPTION - ORIENTATION: ORIENTATION: RIGHT;LEFT

## 2017-11-20 NOTE — PROGRESS NOTES
Location: Leg, Hand  Pain Orientation: Right, Left      Objective          LE Warm Up: Ambulation (F,L,R): x2 laps each direction using rail today   LE Streches: Calf stretch at 4' steps 3x 15 seconds Bilateral   Static Strengthening UEs  Shoulder Flex: x15 in 4'10\"  Shoulder ABD/ADD: x15 in 4'10\"   Shoulder Horiz ABD/ADD: x15 in 4'10\"  Rows: x15 in 4'10\"        Static Strengthening LEs  Heel/Toe Raises: x15 each  Squats: x15  Marching: x1 minute with 1 UE   Hamstring Curls: x15 bilateral   4-Way Hip: 3-way x15 bilateral - cues needed for posture  Lunges (F,L,R): Forward x15 RLE, 10 on LLE  - pain at left knee with flexion  Dynamic Strengthening LE  Step-Ups (F,L,R): Forward and lateral x10 bilateral at 4' steps -   Deep H2O LE  Hang: x3 minutes with noodle in 4'10\" at end of session. Hip Flex/Ext: on noodle 15x in 4'10\" - limited range  Hip ABD/ADD: on noodle 15x in 4'10\" - limited range                             Activity Tolerance:  Activity Tolerance: Patient limited by pain  Activity Tolerance: Pain level decreased to 5/10 following pool session. Burning less in legs following therapeutic pool. Assessment: Body structures, Functions, Activity limitations: Decreased functional mobility , Decreased ROM, Decreased strength, Decreased endurance, Decreased sensation, Decreased balance, Decreased high-level IADLs, Decreased coordination  Assessment: Note improvement today with walking into clinic with staight cane with good vinita and no path deviation. Tolerated pool with decreased pain level to 4/10 to 5/10 and less burning in LEs. Prognosis: Good  Discharge Recommendations: Continue to assess pending progress    Patient Education:  Patient Education: Continue with HEP. Monitor symptoms.                        Plan:  Times per week: 2x/week  Plan weeks: 6 weeks  Specific instructions for Next Treatment: Ther ex, aquatics, neuro ex for core/UE/LE strengthening, balance, decreased pain, gait  Add land based sessions for gait, balance and strength. Current Treatment Recommendations: Strengthening, ROM, Balance Training, Functional Mobility Training, Endurance Training, Gait Training, Stair training, Neuromuscular Re-education, Pain Management, Home Exercise Program, Safety Education & Training, Patient/Caregiver Education & Training, Equipment Evaluation, Education, & procurement  Plan Comment: Continue with plan of care    Goals:  Patient goals : Walk normal, floor transfer     Short term goals  Time Frame for Short term goals: 4 weeks  Short term goal 1: Pt to demo gait with device as needed x100 feet with each swing foot passing stance foot for improved safety with walking. GOAL MET Patient ambulating with straight cane today with improved gait with equal step length but decreased wt shift through LLE due to knee pain. May also benefit from roller walker or 4 ww walker. to improve safey in ambulation. Short term goal 2: Pt to demo up/down 4 steps with 1 handrail with safe technique with modified independence for safety with getting in/out of the pool. Short term goal 3: Pt to demo left knee extension to flexion  AROM improved from lacking 14 to 90 deg to lacking 6 to 120 deg for greater ease with walking and stair climbing. Short term goal 4: Pt to demo left ankle dorsiflexion AROM improved from lacking 8 to lacking 2 deg for greater ease with clearing foot for safety with walking. GOAL MET left ankle ROM to 10 degrees past neutral now and noting improved heelstrike during gait. No new goal   Short term goal 5: Pt to demo right shoulder abduction AROM improved from 58 to 96 deg for greater ease with dressing and grooming. Long term goals  Time Frame for Long term goals : 6 weeks  Long term goal 1: Pt to demo improved Tinetti score from 19/28 to 21/28 for reduced risk of falling in the home and community.          Lauro Simmons, [address] [facility]

## 2017-11-27 ENCOUNTER — HOSPITAL ENCOUNTER (OUTPATIENT)
Dept: PHYSICAL THERAPY | Age: 52
Setting detail: THERAPIES SERIES
Discharge: HOME | End: 2017-11-27
Payer: COMMERCIAL

## 2017-11-27 ENCOUNTER — TELEPHONE (OUTPATIENT)
Dept: PHYSICAL MEDICINE AND REHAB | Age: 52
End: 2017-11-27

## 2017-11-27 DIAGNOSIS — G89.29 OTHER CHRONIC PAIN: Primary | ICD-10-CM

## 2017-11-27 PROCEDURE — 97110 THERAPEUTIC EXERCISES: CPT

## 2017-11-27 ASSESSMENT — PAIN SCALES - GENERAL: PAINLEVEL_OUTOF10: 8

## 2017-11-27 ASSESSMENT — PAIN DESCRIPTION - PAIN TYPE: TYPE: CHRONIC PAIN

## 2017-11-27 ASSESSMENT — PAIN DESCRIPTION - LOCATION: LOCATION: BACK;LEG;HAND
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
## 2017-11-27 NOTE — TELEPHONE ENCOUNTER
Okay please order therapy with that additional diagnosis. Nothing I can do about the Remeron.  Not prescribed from me, she will need to see her psychiatrist.

## 2017-11-27 NOTE — TELEPHONE ENCOUNTER
Patient stopped in the office to notify us that her insurance will not cover the following medication due to taking 3 anti depressants. She would like to know what can be done so she can obtain her medication. Patient would also like to go to [facility] [place] to do therapy. Would like the order to be faxed. Also states that the therapy order also needs to state Bilat shoulder in addition to other dx per [facility] therapy. Please call patient to advise.     mirtazapine (REMERON) 15 MG tablet [[id number]]   Order Details   Dose: 15 mg Route: Oral Frequency: NIGHTLY   Dispense Quantity:  30 tablet Refills:  2 Fills remaining:  --           Sig: Take 1 tablet by mouth nightly          Written Date:  10/16/17 Expiration Date:  10/16/18     Start Date:  10/16/17 End Date:  --     Ordering Provider:  -- Authorizing Provider:  Tinnie Cabot, MD Ordering User:  Tinnie Cabot, MD             Original Order:  mirtazapine (REMERON) 15 MG tablet [[id number]]      Pharmacy:  [facility] [id number] [place] [address] [phone] - F [phone]

## 2017-12-07 ENCOUNTER — ANESTHESIA EVENT (OUTPATIENT)
Dept: OPERATING ROOM | Age: 52
End: 2017-12-07
Payer: COMMERCIAL

## 2017-12-07 ENCOUNTER — HOSPITAL ENCOUNTER (OUTPATIENT)
Age: 52
Setting detail: OUTPATIENT SURGERY
Discharge: HOME | End: 2017-12-07
Attending: PAIN MEDICINE | Admitting: PAIN MEDICINE
Payer: COMMERCIAL

## 2017-12-07 ENCOUNTER — ANESTHESIA (OUTPATIENT)
Dept: OPERATING ROOM | Age: 52
End: 2017-12-07
Payer: COMMERCIAL

## 2017-12-07 ENCOUNTER — APPOINTMENT (OUTPATIENT)
Dept: GENERAL RADIOLOGY | Age: 52
End: 2017-12-07
Attending: PAIN MEDICINE
Payer: COMMERCIAL

## 2017-12-07 VITALS
DIASTOLIC BLOOD PRESSURE: 71 MMHG | TEMPERATURE: 97.9 F | BODY MASS INDEX: 26.74 KG/M2 | SYSTOLIC BLOOD PRESSURE: 107 MMHG | OXYGEN SATURATION: 95 % | RESPIRATION RATE: 13 BRPM | HEIGHT: 67 IN | HEART RATE: 57 BPM | WEIGHT: 170.4 LBS

## 2017-12-07 VITALS — DIASTOLIC BLOOD PRESSURE: 71 MMHG | SYSTOLIC BLOOD PRESSURE: 122 MMHG | OXYGEN SATURATION: 95 %

## 2017-12-07 LAB — GLUCOSE BLD-MCNC: 81 MG/DL (ref 70–108)

## 2017-12-07 PROCEDURE — 7100000010 HC PHASE II RECOVERY - FIRST 15 MIN: Performed by: PAIN MEDICINE

## 2017-12-07 PROCEDURE — 64494 INJ PARAVERT F JNT L/S 2 LEV: CPT | Performed by: PAIN MEDICINE

## 2017-12-07 PROCEDURE — 3600000058 HC PAIN LEVEL 5 BASE: Performed by: PAIN MEDICINE

## 2017-12-07 PROCEDURE — 82948 REAGENT STRIP/BLOOD GLUCOSE: CPT

## 2017-12-07 PROCEDURE — 6360000002 HC RX W HCPCS: Performed by: PAIN MEDICINE

## 2017-12-07 PROCEDURE — 6360000002 HC RX W HCPCS: Performed by: NURSE ANESTHETIST, CERTIFIED REGISTERED

## 2017-12-07 PROCEDURE — 64493 INJ PARAVERT F JNT L/S 1 LEV: CPT | Performed by: PAIN MEDICINE

## 2017-12-07 PROCEDURE — 3700000000 HC ANESTHESIA ATTENDED CARE: Performed by: PAIN MEDICINE

## 2017-12-07 PROCEDURE — A6402 STERILE GAUZE <= 16 SQ IN: HCPCS | Performed by: PAIN MEDICINE

## 2017-12-07 PROCEDURE — 7100000011 HC PHASE II RECOVERY - ADDTL 15 MIN: Performed by: PAIN MEDICINE

## 2017-12-07 PROCEDURE — 3209999900 FLUORO FOR SURGICAL PROCEDURES

## 2017-12-07 PROCEDURE — 2500000003 HC RX 250 WO HCPCS: Performed by: PAIN MEDICINE

## 2017-12-07 PROCEDURE — 64495 INJ PARAVERT F JNT L/S 3 LEV: CPT | Performed by: PAIN MEDICINE

## 2017-12-07 RX ORDER — BETAMETHASONE SODIUM PHOSPHATE AND BETAMETHASONE ACETATE 3; 3 MG/ML; MG/ML
INJECTION, SUSPENSION INTRA-ARTICULAR; INTRALESIONAL; INTRAMUSCULAR; SOFT TISSUE PRN
Status: DISCONTINUED | OUTPATIENT
Start: 2017-12-07 | End: 2017-12-07 | Stop reason: HOSPADM

## 2017-12-07 RX ORDER — BUPIVACAINE HYDROCHLORIDE 2.5 MG/ML
INJECTION, SOLUTION EPIDURAL; INFILTRATION; INTRACAUDAL PRN
Status: DISCONTINUED | OUTPATIENT
Start: 2017-12-07 | End: 2017-12-07 | Stop reason: HOSPADM

## 2017-12-07 RX ORDER — PROPOFOL 10 MG/ML
INJECTION, EMULSION INTRAVENOUS PRN
Status: DISCONTINUED | OUTPATIENT
Start: 2017-12-07 | End: 2017-12-07 | Stop reason: SDUPTHER

## 2017-12-07 RX ORDER — LIDOCAINE HYDROCHLORIDE 10 MG/ML
INJECTION, SOLUTION INFILTRATION; PERINEURAL PRN
Status: DISCONTINUED | OUTPATIENT
Start: 2017-12-07 | End: 2017-12-07 | Stop reason: HOSPADM

## 2017-12-07 RX ADMIN — LIDOCAINE HYDROCHLORIDE 40 MG: 10 INJECTION, SOLUTION INFILTRATION; PERINEURAL at 08:24

## 2017-12-07 RX ADMIN — PROPOFOL 50 MG: 10 INJECTION, EMULSION INTRAVENOUS at 08:25

## 2017-12-07 RX ADMIN — PROPOFOL 50 MG: 10 INJECTION, EMULSION INTRAVENOUS at 08:24

## 2017-12-07 ASSESSMENT — PULMONARY FUNCTION TESTS
PIF_VALUE: 0

## 2017-12-07 ASSESSMENT — PAIN - FUNCTIONAL ASSESSMENT: PAIN_FUNCTIONAL_ASSESSMENT: 0-10

## 2017-12-07 NOTE — OP NOTE
Pre-Procedure Note    Patient Name: Luna Armando   YOB: 1965  Room/Bed: [address] [place] [address] Pool/[facility]  Medical Record Number: [id number]  Date: 11/14/2017      Indication:  Lower back pain  Consent: On file. Vital Signs:   Vitals:    12/07/17 0709   BP: 114/70   Pulse: 66   Resp: 16   Temp: 98 °F (36.7 °C)   SpO2: 99%       Pre-Sedation Documentation and Exam:   Vital signs have been reviewed (see flow sheet for vitals). Sedation/ Anesthesia Plan:   MAC    Patient is an appropriate candidate for plan of sedation: yes    Preoperative Diagnosis:   L-spondylosis  Postoperative Diagnosis:  As above    Procedure Performed:  Diagnostic/Confirmatory median branch blocks at the levels of L3-4,L4-5 and L5-S1 bilateral under fluoroscopic guidance #1. Indication for the Procedure: The patient has a history of chronic low back pain that is not responding well to the conservative treatment. The patient's pain is mostly axial in nature. Pain is interfering with the activities of daily living. Physical examination revealed facet tenderness and facet loading is positive. The procedure and risks  were discussed with the patient and an informed consent was obtained. Procedure: Bilateral    Patient is placed in prone position and skin over  the back was prepped and draped in sterile manner. Then using fluoroscopy the junction of the transverse process of the vertebra with the superior process of the facet joint was observed and the view was optimized. The skin and deep tissues posterior were infiltrated with 12 ml of 1% lidocaine. Then a #22-gauge 3-1/2  inch spinal needle was introduced through the skin wheal under fluoroscopy guidance such that the tip of the needle lies at the junction of the transverse process with the superior processes of the facet joint. Then after negative aspiration a total of 12 ml of 0.25% Marcaine and Celestone (total 12 mg) was injected through the needles.   This was done at the

## 2017-12-07 NOTE — ANESTHESIA POSTPROCEDURE EVALUATION
Department of Anesthesiology  Postprocedure Note    Patient: Tami Barnett  MRN: [id number]  YOB: 1965  Date of evaluation: 12/7/2017  Time:  9:02 AM     Procedure Summary     Date:  12/07/17 Room / Location:  [facility] 03 / [address]    Anesthesia Start:  0823 Anesthesia Stop:  4536    Procedure:  LUMBAR FACET MBB L3-4, L4-5, L5-S1 BILATERAL (Bilateral Back) Diagnosis:  (LUMBAR SPONDYLOSIS)    Surgeon:  Mayra Gomez MD Responsible Provider:  Keny Wilkinson DO    Anesthesia Type:  MAC ASA Status:  2          Anesthesia Type: MAC    Jose Phase I: Jose Score: 10    Jose Phase II: Jose Score: 9    Last vitals: Reviewed and per EMR flowsheets. Anesthesia Post Evaluation    Comments: Taran Ramirez 59  POST-ANESTHESIA NOTE       Name:  Tami Barnett                                         Age:  47 y.o. MRN:  [id number]      Last Vitals:  /71   Pulse 57   Temp 97.9 °F (36.6 °C) (Temporal)   Resp 13   Ht 5' 7\" (1.702 m)   Wt 170 lb 6.4 oz (77.3 kg)   SpO2 95%   BMI 26.69 kg/m²   Patient Vitals in the past 4 hrs:  12/07/17 0836, BP:107/71, Temp:97.9 °F (36.6 °C), Temp src:Temporal, Pulse:57, Resp:13, SpO2:95 %  12/07/17 0709, BP:114/70, Temp:98 °F (36.7 °C), Temp src:Temporal, Pulse:66, Resp:16, SpO2:99 %, Height:5' 7\" (1.702 m), Weight:170 lb 6.4 oz (77.3 kg)    Level of Consciousness:  Awake    Respiratory:  Stable    Oxygen Saturation:  Stable    Cardiovascular:  Stable    Hydration:  Adequate    PONV:  Stable    Post-op Pain:  Adequate analgesia    Post-op Assessment:  No apparent anesthetic complications    Additional Follow-Up / Treatment / Comment:  None    Daniele Sorto DO  December 7, 2017   9:02 AM

## 2017-12-07 NOTE — ANESTHESIA PRE PROCEDURE
neurogenic claudication M48.061    Lumbar radiculitis M54.16    Depression F32.9       Past Medical History:        Diagnosis Date    Depression     DM2 [facility]diabetes mellitus, type 2) [facility] [address]     Endometriosis     resolved after hysto    Esophageal stricture     Female bladder prolapse     had sling surgery    Former smoker     History of gastric bypass 2010    Lost 130 lbs.  History of hypertension     resolved after gastric bypass    Hypotension     Iron deficiency anemia     Obesity     Pulmonary nodule     Awaiting records, pt not sure when next CT due. Hasn't grown in years.     Spinal cord injury, C5-C7 ([facility] [address]     Vitamin D deficiency        Past Surgical History:        Procedure Laterality Date    BLADDER SUSPENSION  8 2 11     SECTION, LOW TRANSVERSE      x3    CHOLECYSTECTOMY      ESOPHAGEAL DILATATION  5 20 15    See media tab    FOOT SURGERY  3/3/15    Dr. Kayden Owen     [address]    Endometriosis    LEG SURGERY Left 2017    left thigh    NECK SURGERY  2015    ACDF C6-7    OTHER SURGICAL HISTORY  2015    repair scalp laceration    OTHER SURGICAL HISTORY  6/10/2016    EXCISION AND DEBRIDEMENT OF LEFT THIGH SEROMA--Dr. Dave    OTHER SURGICAL HISTORY Left 2016    drainage of left chronic thigh seroma    OTHER SURGICAL HISTORY  10/16/2017    Lumbar epidural steroid injection at L3-4    HI NJX DX/THER SBST EPIDURAL/SUBARACH LUMBAR/SACRAL N/A 10/16/2017    LESI @ L performed by Brittanie Siddiqui MD at [address] [place] [address] ENDOSCOPY   15    See media tab       Social History:    Social History   Substance Use Topics    Smoking status: Former Smoker     Packs/day: 1.00     Years: 23.00     Quit date: 2001    Smokeless tobacco: Never Used    Alcohol use No                                Counseling given: Not Answered      Vital Signs (Current):   Vitals:    12/07/17 0709   BP: 114/70   Pulse: 66   Resp: 16   Temp: 98 °F (36.7 °C)   TempSrc: Temporal   SpO2: 99%   Weight: 170 lb 6.4 oz (77.3 kg)   Height: 5' 7\" (1.702 m)                                              BP Readings from Last 3 Encounters:   12/07/17 114/70   11/14/17 125/75   11/08/17 128/70       NPO Status: Time of last liquid consumption: 0600 (sip of water with med)                        Time of last solid consumption: 1900                        Date of last liquid consumption: 12/07/17                        Date of last solid food consumption: 12/06/17    BMI:   Wt Readings from Last 3 Encounters:   12/07/17 170 lb 6.4 oz (77.3 kg)   11/14/17 165 lb 5.5 oz (75 kg)   11/08/17 165 lb 6.4 oz (75 kg)     Body mass index is 26.69 kg/m².     CBC:   Lab Results   Component Value Date    WBC 6.4 10/22/2017    RBC 4.59 10/22/2017    HGB 13.7 10/22/2017    HCT 40.2 10/22/2017    MCV 87.7 10/22/2017    RDW 14.6 10/22/2017     10/22/2017       CMP:   Lab Results   Component Value Date     10/22/2017    K 4.2 10/22/2017     10/22/2017    CO2 30 10/22/2017    BUN 10 10/22/2017    CREATININE 0.5 10/22/2017    LABGLOM >90 10/22/2017    GLUCOSE 88 10/22/2017    PROT 5.8 06/10/2016    CALCIUM 8.3 10/22/2017    BILITOT 0.3 06/10/2016    ALKPHOS 64 06/10/2016    AST 14 06/10/2016    ALT 11 06/10/2016       POC Tests:   Recent Labs      12/07/17   0715   POCGLU  81       Coags:   Lab Results   Component Value Date    INR 1.04 12/31/2015    APTT 26.2 12/31/2015       HCG (If Applicable): No results found for: PREGTESTUR, PREGSERUM, HCG, HCGQUANT     ABGs: No results found for: PHART, PO2ART, EZJ9RJG, QCB7JGK, BEART, V5KYJWHG     Type & Screen (If Applicable):  Lab Results   Component Value Date    [address] POS 12/31/2015       Anesthesia Evaluation  Patient summary reviewed  Airway: Mallampati: II  TM distance: >3 FB   Neck ROM: full  Mouth opening: > = 3 FB Dental: Pulmonary:                              Cardiovascular:                      Neuro/Psych:   (+) neuromuscular disease:, psychiatric history:            GI/Hepatic/Renal:             Endo/Other:                     Abdominal:           Vascular:                                        Anesthesia Plan      MAC     ASA 2       Induction: intravenous. Anesthetic plan and risks discussed with patient, spouse and child/children. Plan discussed with CRNA. Tao Raman.  DO Macario   12/7/2017

## 2017-12-11 ENCOUNTER — TELEPHONE (OUTPATIENT)
Dept: FAMILY MEDICINE CLINIC | Age: 52
End: 2017-12-11

## 2017-12-11 DIAGNOSIS — G82.54 INCOMPLETE QUADRIPLEGIA AT C5-C8 LEVEL (HCC): ICD-10-CM

## 2017-12-11 DIAGNOSIS — M54.5 CHRONIC MIDLINE LOW BACK PAIN, WITH SCIATICA PRESENCE UNSPECIFIED: ICD-10-CM

## 2017-12-11 DIAGNOSIS — G62.9 NEUROPATHY: ICD-10-CM

## 2017-12-11 DIAGNOSIS — G89.29 CHRONIC MIDLINE LOW BACK PAIN, WITH SCIATICA PRESENCE UNSPECIFIED: ICD-10-CM

## 2017-12-11 RX ORDER — HYDROCODONE BITARTRATE AND ACETAMINOPHEN 7.5; 325 MG/1; MG/1
1 TABLET ORAL EVERY 8 HOURS PRN
Qty: 90 TABLET | Refills: 0 | Status: SHIPPED | OUTPATIENT
Start: 2017-12-11 | End: 2018-01-11 | Stop reason: SDUPTHER

## 2017-12-11 RX ORDER — GABAPENTIN 800 MG/1
800 TABLET ORAL 3 TIMES DAILY
Qty: 90 TABLET | Refills: 0 | Status: SHIPPED | OUTPATIENT
Start: 2017-12-11 | End: 2018-01-11 | Stop reason: SDUPTHER

## 2017-12-11 RX ORDER — GABAPENTIN 800 MG/1
800 TABLET ORAL 3 TIMES DAILY
Qty: 90 TABLET | Refills: 0 | Status: SHIPPED | OUTPATIENT
Start: 2017-12-11 | End: 2017-12-11 | Stop reason: SDUPTHER

## 2017-12-11 RX ORDER — TRAMADOL HYDROCHLORIDE 50 MG/1
TABLET ORAL
Qty: 120 TABLET | Refills: 0 | Status: SHIPPED | OUTPATIENT
Start: 2017-12-11 | End: 2018-01-11 | Stop reason: ALTCHOICE

## 2017-12-11 NOTE — TELEPHONE ENCOUNTER
The Amitriptyline and Remeron were refill too soon. Patient was notified. I do not see a current prescription for Gabapentin that was written by Dr. Uzair Giordano for this dosage. Will check with Dr. Uzair Giordano if she will refill. Patient requesting Neurontin 800 mg 1 tablet BID to [facility].   Please advise

## 2017-12-11 NOTE — TELEPHONE ENCOUNTER
Patient is wanting to know why her med refills were denied. She has been out of meds all wknd. Please contact patient.

## 2018-01-04 ENCOUNTER — INITIAL CONSULT (OUTPATIENT)
Dept: NEUROLOGY | Age: 53
End: 2018-01-04
Payer: COMMERCIAL

## 2018-01-04 VITALS
OXYGEN SATURATION: 98 % | SYSTOLIC BLOOD PRESSURE: 136 MMHG | HEIGHT: 67 IN | HEART RATE: 66 BPM | BODY MASS INDEX: 27.12 KG/M2 | DIASTOLIC BLOOD PRESSURE: 88 MMHG | WEIGHT: 172.8 LBS

## 2018-01-04 DIAGNOSIS — R26.0 ATAXIC GAIT: ICD-10-CM

## 2018-01-04 DIAGNOSIS — R20.2 NUMBNESS AND TINGLING OF BOTH LEGS: ICD-10-CM

## 2018-01-04 DIAGNOSIS — R20.0 NUMBNESS AND TINGLING OF BOTH LEGS: ICD-10-CM

## 2018-01-04 DIAGNOSIS — M47.14 THORACIC MYELOPATHY: Primary | ICD-10-CM

## 2018-01-04 DIAGNOSIS — R29.898 LEFT LEG WEAKNESS: ICD-10-CM

## 2018-01-04 PROCEDURE — 3017F COLORECTAL CA SCREEN DOC REV: CPT | Performed by: PSYCHIATRY & NEUROLOGY

## 2018-01-04 PROCEDURE — G8484 FLU IMMUNIZE NO ADMIN: HCPCS | Performed by: PSYCHIATRY & NEUROLOGY

## 2018-01-04 PROCEDURE — 3014F SCREEN MAMMO DOC REV: CPT | Performed by: PSYCHIATRY & NEUROLOGY

## 2018-01-04 PROCEDURE — G8427 DOCREV CUR MEDS BY ELIG CLIN: HCPCS | Performed by: PSYCHIATRY & NEUROLOGY

## 2018-01-04 PROCEDURE — 99244 OFF/OP CNSLTJ NEW/EST MOD 40: CPT | Performed by: PSYCHIATRY & NEUROLOGY

## 2018-01-04 PROCEDURE — G8417 CALC BMI ABV UP PARAM F/U: HCPCS | Performed by: PSYCHIATRY & NEUROLOGY

## 2018-01-04 NOTE — PATIENT INSTRUCTIONS
1. B12, Folate, B6.  2. MRI Thoracic spine with and without contrast.   3. MRI Cervical spine without contrast.   4. Call with any new symptoms or concerns. 5. Follow up in 1 months.

## 2018-01-04 NOTE — PROGRESS NOTES
 gabapentin (NEURONTIN) 800 MG tablet Take 1 tablet by mouth 3 times daily Dose increase 90 tablet 0    Multiple Vitamins-Minerals (THERAPEUTIC MULTIVITAMIN-MINERALS) tablet Take 1 tablet by mouth daily      brompheniramine-pseudoephedrine-DM (BROMFED DM) 30-2-10 MG/5ML syrup Take 5 mLs by mouth 4 times daily as needed for Congestion or Cough 120 mL 0    butalbital-aspirin-caffeine (FIORINAL) -40 MG per capsule Take 1 capsule by mouth every 6 hours as needed for Headaches 20 capsule 0    ondansetron (ZOFRAN ODT) 4 MG disintegrating tablet Take 1 tablet by mouth every 8 hours as needed for Nausea 20 tablet 0    DULoxetine (CYMBALTA) 60 MG extended release capsule Take 1 capsule by mouth daily 30 capsule 2    mirtazapine (REMERON) 15 MG tablet Take 1 tablet by mouth nightly 30 tablet 2    metFORMIN (GLUCOPHAGE) 500 MG tablet Take 1 tablet by mouth 2 times daily (with meals) 60 tablet 2    docusate sodium (COLACE) 100 MG capsule Take 100 mg by mouth as needed for Constipation      vitamin B-12 (CYANOCOBALAMIN) 500 MCG tablet Take 500 mcg by mouth daily      Cholecalciferol (VITAMIN D) 2000 UNITS CAPS capsule Take by mouth daily       amitriptyline (ELAVIL) 50 MG tablet Take 1 tablet by mouth nightly 30 tablet 2    iron sucrose (VENOFER) 20 MG/ML injection Infuse 10 mLs intravenously every 3 days for 5 doses Infuse slowly over at least 5 minutes. 50 mL 0    vitamin D (ERGOCALCIFEROL) 60538 units CAPS capsule Take 1 capsule by mouth once a week for 12 doses 12 capsule 0     No current facility-administered medications for this visit.         Social History     Social History    Marital status:      Spouse name: Daryl Lewis Number of children: N/A    Years of education: N/A     Occupational History    Unemployed      Social History Main Topics    Smoking status: Former Smoker     Packs/day: 1.00     Years: 23.00     Quit date: 1/1/2001    Smokeless tobacco: Never Used    Alcohol use No    Drug use: No    Sexual activity: Not Asked     Other Topics Concern    None     Social History Narrative    None       Family History   Problem Relation Age of Onset    Diabetes Mother     Heart Disease Mother     Cancer Father      brain, pancreatic    High Blood Pressure Father     Cancer Brother      leukemia       Review of Systems   Complete review of systems is negative other than what is mentioned in HPI      Vitals:    01/04/18 0835   BP: 136/88   Site: Left Arm   Position: Sitting   Pulse: 66   SpO2: 98%   Weight: 172 lb 12.8 oz (78.4 kg)   Height: 5' 7\" (1.702 m)       Physical Examination:  General appearance - alert, well appearing, and in no distress, oriented to person, place, and time and normal weight  Mental status- Level of Alertness: awake  Orientation: person, place, time  Memory: normal  Fund of Knowledge: normal  Attention/Concentration: normal  Language: normal. Mood is normal.   Neck - supple, no significant adenopathy, carotids upstroke normal bilaterally,   There is no carotid bruit . No neck lymphadenopathy . No thyroid enlargement   Neurological -   Cranial Herenk-UQ-WAK:.   Cranial nerve II: Normal   Cranial nerve III: Pupils: equal, round, reactive to light  Cranial nerves III, IV, VI: Extraocular Movements: intact   Cranial nerve V: Facial sensation: intact   Cranial nerve VII:Facial strength: intact   Cranial nerve VIII: Hearing: intact   Cranial nerve IX: Palate Elevation intact bilaterally  Cranial nerve XI: Shoulder shrug intact bilaterally  Cranial nerve XII: Tongue midline   neck supple without rigidity  DTR's are decreased in the upper extremities, and brisk in the lower extremities and symmetric  Babinski sign negative  Romberg unable to performed  Motor exam is 5/5 in the bilateral upper and right lower extremities, the left lower extremity power was 4/5 proximal and distal weakness. Decreased left foot tapping compared to the rigth. Normal muscle tone .  There is no

## 2018-01-11 ENCOUNTER — TELEPHONE (OUTPATIENT)
Dept: PHYSICAL MEDICINE AND REHAB | Age: 53
End: 2018-01-11

## 2018-01-11 ENCOUNTER — OFFICE VISIT (OUTPATIENT)
Dept: PHYSICAL MEDICINE AND REHAB | Age: 53
End: 2018-01-11
Payer: COMMERCIAL

## 2018-01-11 VITALS
DIASTOLIC BLOOD PRESSURE: 74 MMHG | SYSTOLIC BLOOD PRESSURE: 128 MMHG | HEART RATE: 66 BPM | HEIGHT: 67 IN | BODY MASS INDEX: 27.13 KG/M2 | WEIGHT: 172.84 LBS

## 2018-01-11 DIAGNOSIS — G89.29 CHRONIC MIDLINE LOW BACK PAIN, WITH SCIATICA PRESENCE UNSPECIFIED: ICD-10-CM

## 2018-01-11 DIAGNOSIS — G89.29 OTHER CHRONIC PAIN: Primary | ICD-10-CM

## 2018-01-11 DIAGNOSIS — M48.062 SPINAL STENOSIS OF LUMBAR REGION WITH NEUROGENIC CLAUDICATION: ICD-10-CM

## 2018-01-11 DIAGNOSIS — G82.54 INCOMPLETE QUADRIPLEGIA AT C5-C8 LEVEL (HCC): ICD-10-CM

## 2018-01-11 DIAGNOSIS — M54.16 LUMBAR RADICULITIS: Primary | ICD-10-CM

## 2018-01-11 DIAGNOSIS — M54.5 CHRONIC BILATERAL LOW BACK PAIN, WITH SCIATICA PRESENCE UNSPECIFIED: ICD-10-CM

## 2018-01-11 DIAGNOSIS — G62.9 NEUROPATHY: ICD-10-CM

## 2018-01-11 DIAGNOSIS — M25.532 LEFT WRIST PAIN: ICD-10-CM

## 2018-01-11 DIAGNOSIS — G89.4 CHRONIC PAIN SYNDROME: ICD-10-CM

## 2018-01-11 DIAGNOSIS — M54.5 CHRONIC MIDLINE LOW BACK PAIN, WITH SCIATICA PRESENCE UNSPECIFIED: ICD-10-CM

## 2018-01-11 DIAGNOSIS — M47.816 SPONDYLOSIS OF LUMBAR REGION WITHOUT MYELOPATHY OR RADICULOPATHY: ICD-10-CM

## 2018-01-11 DIAGNOSIS — G89.29 CHRONIC BILATERAL LOW BACK PAIN, WITH SCIATICA PRESENCE UNSPECIFIED: ICD-10-CM

## 2018-01-11 PROCEDURE — G8484 FLU IMMUNIZE NO ADMIN: HCPCS | Performed by: NURSE PRACTITIONER

## 2018-01-11 PROCEDURE — G8427 DOCREV CUR MEDS BY ELIG CLIN: HCPCS | Performed by: NURSE PRACTITIONER

## 2018-01-11 PROCEDURE — 1036F TOBACCO NON-USER: CPT | Performed by: NURSE PRACTITIONER

## 2018-01-11 PROCEDURE — 99214 OFFICE O/P EST MOD 30 MIN: CPT | Performed by: NURSE PRACTITIONER

## 2018-01-11 PROCEDURE — 3014F SCREEN MAMMO DOC REV: CPT | Performed by: NURSE PRACTITIONER

## 2018-01-11 PROCEDURE — G8417 CALC BMI ABV UP PARAM F/U: HCPCS | Performed by: NURSE PRACTITIONER

## 2018-01-11 PROCEDURE — 3017F COLORECTAL CA SCREEN DOC REV: CPT | Performed by: NURSE PRACTITIONER

## 2018-01-11 RX ORDER — MORPHINE SULFATE 15 MG/1
15 TABLET, FILM COATED, EXTENDED RELEASE ORAL 2 TIMES DAILY
Qty: 28 TABLET | Refills: 0 | Status: SHIPPED | OUTPATIENT
Start: 2018-01-11 | End: 2018-02-07 | Stop reason: SDUPTHER

## 2018-01-11 RX ORDER — GABAPENTIN 800 MG/1
800 TABLET ORAL 3 TIMES DAILY
Qty: 90 TABLET | Refills: 0 | Status: SHIPPED | OUTPATIENT
Start: 2018-01-11 | End: 2018-02-07 | Stop reason: SDUPTHER

## 2018-01-11 RX ORDER — HYDROCODONE BITARTRATE AND ACETAMINOPHEN 7.5; 325 MG/1; MG/1
1 TABLET ORAL EVERY 8 HOURS PRN
Qty: 90 TABLET | Refills: 0 | Status: SHIPPED | OUTPATIENT
Start: 2018-01-11 | End: 2018-02-07 | Stop reason: SDUPTHER

## 2018-01-11 ASSESSMENT — ENCOUNTER SYMPTOMS: BACK PAIN: 1

## 2018-01-11 NOTE — PROGRESS NOTES
Headaches, Disp: 20 capsule, Rfl: 0    ondansetron (ZOFRAN ODT) 4 MG disintegrating tablet, Take 1 tablet by mouth every 8 hours as needed for Nausea, Disp: 20 tablet, Rfl: 0    DULoxetine (CYMBALTA) 60 MG extended release capsule, Take 1 capsule by mouth daily, Disp: 30 capsule, Rfl: 2    mirtazapine (REMERON) 15 MG tablet, Take 1 tablet by mouth nightly, Disp: 30 tablet, Rfl: 2    metFORMIN (GLUCOPHAGE) 500 MG tablet, Take 1 tablet by mouth 2 times daily (with meals), Disp: 60 tablet, Rfl: 2    docusate sodium (COLACE) 100 MG capsule, Take 100 mg by mouth as needed for Constipation, Disp: , Rfl:     vitamin B-12 (CYANOCOBALAMIN) 500 MCG tablet, Take 500 mcg by mouth daily, Disp: , Rfl:     Cholecalciferol (VITAMIN D) 2000 UNITS CAPS capsule, Take by mouth daily , Disp: , Rfl:     amitriptyline (ELAVIL) 50 MG tablet, Take 1 tablet by mouth nightly, Disp: 30 tablet, Rfl: 2    iron sucrose (VENOFER) 20 MG/ML injection, Infuse 10 mLs intravenously every 3 days for 5 doses Infuse slowly over at least 5 minutes. , Disp: 50 mL, Rfl: 0    vitamin D (ERGOCALCIFEROL) 44212 units CAPS capsule, Take 1 capsule by mouth once a week for 12 doses, Disp: 12 capsule, Rfl: 0    Subjective:      Review of Systems   Musculoskeletal: Positive for arthralgias, back pain, myalgias, neck pain and neck stiffness. Neurological: Positive for weakness and numbness. Objective:     Vitals:    01/11/18 0816   BP: 128/74   Site: Left Arm   Position: Sitting   Pulse: 66   Weight: 172 lb 13.5 oz (78.4 kg)   Height: 5' 7.01\" (1.702 m)       Physical Exam   Constitutional: She is oriented to person, place, and time. She appears well-developed and well-nourished. No distress. HENT:   Head: Normocephalic and atraumatic. Right Ear: External ear normal.   Left Ear: External ear normal.   Nose: Nose normal.   Mouth/Throat: Oropharynx is clear and moist. No oropharyngeal exudate.    Eyes: Conjunctivae and EOM are normal. Pupils are equal, round, and reactive to light. Right eye exhibits no discharge. Left eye exhibits no discharge. No scleral icterus. Neck: Normal range of motion and full passive range of motion without pain. Neck supple. No muscular tenderness present. No neck rigidity. No edema, no erythema and normal range of motion present. No thyromegaly present. Cardiovascular: Normal rate, regular rhythm, normal heart sounds and intact distal pulses. Exam reveals no gallop and no friction rub. No murmur heard. Pulmonary/Chest: Effort normal and breath sounds normal. No respiratory distress. She has no wheezes. She has no rales. She exhibits no tenderness. Abdominal: Soft. Bowel sounds are normal. She exhibits no distension. There is no tenderness. There is no rebound and no guarding. Musculoskeletal: She exhibits tenderness. Right shoulder: She exhibits decreased range of motion and tenderness. Left shoulder: She exhibits decreased range of motion and tenderness. Right elbow: Tenderness found. Left elbow: Tenderness found. Right wrist: She exhibits tenderness. Left wrist: She exhibits tenderness. Right hip: She exhibits tenderness. Left hip: She exhibits tenderness. Right knee: Tenderness found. Left knee: She exhibits decreased range of motion and swelling. Tenderness found. Right ankle: She exhibits swelling. Tenderness. Left ankle: She exhibits decreased range of motion and swelling. Tenderness. Cervical back: She exhibits decreased range of motion, tenderness, pain and spasm. Thoracic back: She exhibits tenderness. Lumbar back: She exhibits tenderness, pain and spasm. Back:         Right upper arm: She exhibits tenderness. Left upper arm: She exhibits tenderness. Right forearm: She exhibits tenderness. Left forearm: She exhibits tenderness. Right hand: She exhibits tenderness. Decreased strength noted. Left hand: She exhibits tenderness. Decreased strength noted. Right upper leg: She exhibits tenderness. Left upper leg: She exhibits tenderness. Right lower leg: She exhibits tenderness and swelling. Left lower leg: She exhibits tenderness and swelling. Right foot: There is tenderness and swelling. Left foot: There is decreased range of motion, tenderness and swelling. Neurological: She is alert and oriented to person, place, and time. She has normal strength. She is not disoriented. She displays no atrophy. No cranial nerve deficit or sensory deficit. She exhibits normal muscle tone. She displays a negative Romberg sign. Coordination and gait normal. She displays no Babinski's sign on the right side. Reflex Scores:       Tricep reflexes are 1+ on the right side and 1+ on the left side. Bicep reflexes are 1+ on the right side and 1+ on the left side. Brachioradialis reflexes are 1+ on the right side and 1+ on the left side. Patellar reflexes are 2+ on the right side and 2+ on the left side. Achilles reflexes are 2+ on the right side and 2+ on the left side. SLR -  Motor 3/5 LUE LLE RUE 4/5 RLE 4/5   Skin: Skin is warm. No rash noted. She is not diaphoretic. No erythema. No pallor. Psychiatric: She has a normal mood and affect. Her speech is normal and behavior is normal. Judgment and thought content normal. Her mood appears not anxious. Her affect is not angry, not blunt, not labile and not inappropriate. She is not agitated, not aggressive, not hyperactive, not slowed, not withdrawn, not actively hallucinating and not combative. Thought content is not paranoid and not delusional. Cognition and memory are normal. Cognition and memory are not impaired. She does not express impulsivity or inappropriate judgment. She does not exhibit a depressed mood. She expresses no homicidal and no suicidal ideation.  She

## 2018-01-15 RX ORDER — DULOXETIN HYDROCHLORIDE 60 MG/1
CAPSULE, DELAYED RELEASE ORAL
Qty: 30 CAPSULE | Refills: 1 | Status: SHIPPED | OUTPATIENT
Start: 2018-01-15 | End: 2018-02-19 | Stop reason: SDUPTHER

## 2018-01-17 ENCOUNTER — TELEPHONE (OUTPATIENT)
Dept: PHYSICAL MEDICINE AND REHAB | Age: 53
End: 2018-01-17

## 2018-01-19 ENCOUNTER — HOSPITAL ENCOUNTER (OUTPATIENT)
Dept: MRI IMAGING | Age: 53
Discharge: HOME | End: 2018-01-19
Payer: COMMERCIAL

## 2018-01-19 DIAGNOSIS — R20.2 NUMBNESS AND TINGLING OF BOTH LEGS: ICD-10-CM

## 2018-01-19 DIAGNOSIS — R20.0 NUMBNESS AND TINGLING OF BOTH LEGS: ICD-10-CM

## 2018-01-19 DIAGNOSIS — R26.0 ATAXIC GAIT: ICD-10-CM

## 2018-01-19 LAB — POC CREATININE WHOLE BLOOD: 0.6 MG/DL (ref 0.5–1.2)

## 2018-01-19 PROCEDURE — 6360000004 HC RX CONTRAST MEDICATION: Performed by: PSYCHIATRY & NEUROLOGY

## 2018-01-19 PROCEDURE — 82565 ASSAY OF CREATININE: CPT

## 2018-01-19 PROCEDURE — 72141 MRI NECK SPINE W/O DYE: CPT

## 2018-01-19 PROCEDURE — 72157 MRI CHEST SPINE W/O & W/DYE: CPT

## 2018-01-19 PROCEDURE — A9579 GAD-BASE MR CONTRAST NOS,1ML: HCPCS | Performed by: PSYCHIATRY & NEUROLOGY

## 2018-01-19 RX ADMIN — GADOTERIDOL 15 ML: 279.3 INJECTION, SOLUTION INTRAVENOUS at 09:13

## 2018-01-21 NOTE — PROGRESS NOTES
Please ask patient to follow up with us re MRI Cervical and thoracic spine results, within 2 weeks.    Jenn Maxwell MD

## 2018-01-22 ENCOUNTER — TELEPHONE (OUTPATIENT)
Dept: NEUROLOGY | Age: 53
End: 2018-01-22

## 2018-01-22 NOTE — PROGRESS NOTES
Please ask patient to follow up with us re MRI c spine results, within 2 weeks.    Sandy Rodriguez MD

## 2018-01-29 ENCOUNTER — OFFICE VISIT (OUTPATIENT)
Dept: NEUROLOGY | Age: 53
End: 2018-01-29
Payer: COMMERCIAL

## 2018-01-29 VITALS
SYSTOLIC BLOOD PRESSURE: 128 MMHG | WEIGHT: 175 LBS | DIASTOLIC BLOOD PRESSURE: 78 MMHG | BODY MASS INDEX: 27.47 KG/M2 | HEIGHT: 67 IN | HEART RATE: 72 BPM

## 2018-01-29 DIAGNOSIS — M47.14 THORACIC MYELOPATHY: Primary | ICD-10-CM

## 2018-01-29 DIAGNOSIS — R26.0 ATAXIC GAIT: ICD-10-CM

## 2018-01-29 DIAGNOSIS — R20.0 NUMBNESS AND TINGLING OF BOTH LEGS: ICD-10-CM

## 2018-01-29 DIAGNOSIS — R29.898 LEFT LEG WEAKNESS: ICD-10-CM

## 2018-01-29 DIAGNOSIS — R20.2 NUMBNESS AND TINGLING OF BOTH LEGS: ICD-10-CM

## 2018-01-29 PROCEDURE — G8417 CALC BMI ABV UP PARAM F/U: HCPCS | Performed by: PSYCHIATRY & NEUROLOGY

## 2018-01-29 PROCEDURE — 3014F SCREEN MAMMO DOC REV: CPT | Performed by: PSYCHIATRY & NEUROLOGY

## 2018-01-29 PROCEDURE — 1036F TOBACCO NON-USER: CPT | Performed by: PSYCHIATRY & NEUROLOGY

## 2018-01-29 PROCEDURE — G8427 DOCREV CUR MEDS BY ELIG CLIN: HCPCS | Performed by: PSYCHIATRY & NEUROLOGY

## 2018-01-29 PROCEDURE — 99213 OFFICE O/P EST LOW 20 MIN: CPT | Performed by: PSYCHIATRY & NEUROLOGY

## 2018-01-29 PROCEDURE — 3017F COLORECTAL CA SCREEN DOC REV: CPT | Performed by: PSYCHIATRY & NEUROLOGY

## 2018-01-29 PROCEDURE — G8484 FLU IMMUNIZE NO ADMIN: HCPCS | Performed by: PSYCHIATRY & NEUROLOGY
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
## 2018-01-29 NOTE — PATIENT INSTRUCTIONS
1.  EMG bilateral lower extremities, 2/2/20178@ 8:15 am  2. Referral made to spine specialist.   3.  Follow up after evaluation in 4 weeks. 4. Call if any questions or concerns.

## 2018-02-02 ENCOUNTER — PROCEDURE VISIT (OUTPATIENT)
Dept: NEUROLOGY | Age: 53
End: 2018-02-02
Payer: COMMERCIAL

## 2018-02-02 DIAGNOSIS — R20.0 NUMBNESS: ICD-10-CM

## 2018-02-02 DIAGNOSIS — M54.16 LUMBAR RADICULOPATHY: Primary | ICD-10-CM

## 2018-02-02 DIAGNOSIS — G62.9 NEUROPATHY: ICD-10-CM

## 2018-02-02 PROCEDURE — 95910 NRV CNDJ TEST 7-8 STUDIES: CPT | Performed by: PSYCHIATRY & NEUROLOGY

## 2018-02-02 PROCEDURE — 95886 MUSC TEST DONE W/N TEST COMP: CPT | Performed by: PSYCHIATRY & NEUROLOGY

## 2018-02-06 ENCOUNTER — OFFICE VISIT (OUTPATIENT)
Dept: FAMILY MEDICINE CLINIC | Age: 53
End: 2018-02-06
Payer: COMMERCIAL

## 2018-02-06 VITALS
WEIGHT: 180 LBS | DIASTOLIC BLOOD PRESSURE: 60 MMHG | HEART RATE: 70 BPM | SYSTOLIC BLOOD PRESSURE: 110 MMHG | BODY MASS INDEX: 28.25 KG/M2 | HEIGHT: 67 IN

## 2018-02-06 DIAGNOSIS — E55.9 VITAMIN D DEFICIENCY: ICD-10-CM

## 2018-02-06 DIAGNOSIS — Z13.220 SCREENING FOR HYPERLIPIDEMIA: ICD-10-CM

## 2018-02-06 DIAGNOSIS — E11.9 TYPE 2 DIABETES MELLITUS WITHOUT COMPLICATION, WITHOUT LONG-TERM CURRENT USE OF INSULIN (HCC): Primary | ICD-10-CM

## 2018-02-06 DIAGNOSIS — E61.1 IRON DEFICIENCY: ICD-10-CM

## 2018-02-06 DIAGNOSIS — F33.1 MODERATE EPISODE OF RECURRENT MAJOR DEPRESSIVE DISORDER (HCC): ICD-10-CM

## 2018-02-06 PROCEDURE — 3014F SCREEN MAMMO DOC REV: CPT | Performed by: FAMILY MEDICINE

## 2018-02-06 PROCEDURE — 3017F COLORECTAL CA SCREEN DOC REV: CPT | Performed by: FAMILY MEDICINE

## 2018-02-06 PROCEDURE — G8484 FLU IMMUNIZE NO ADMIN: HCPCS | Performed by: FAMILY MEDICINE

## 2018-02-06 PROCEDURE — 99214 OFFICE O/P EST MOD 30 MIN: CPT | Performed by: FAMILY MEDICINE

## 2018-02-06 PROCEDURE — 1036F TOBACCO NON-USER: CPT | Performed by: FAMILY MEDICINE

## 2018-02-06 PROCEDURE — G8427 DOCREV CUR MEDS BY ELIG CLIN: HCPCS | Performed by: FAMILY MEDICINE

## 2018-02-06 PROCEDURE — 3046F HEMOGLOBIN A1C LEVEL >9.0%: CPT | Performed by: FAMILY MEDICINE

## 2018-02-06 PROCEDURE — G8417 CALC BMI ABV UP PARAM F/U: HCPCS | Performed by: FAMILY MEDICINE

## 2018-02-06 ASSESSMENT — ENCOUNTER SYMPTOMS
BACK PAIN: 1
SHORTNESS OF BREATH: 0
EYE DISCHARGE: 0
BLOOD IN STOOL: 0
EYE REDNESS: 0
CHEST TIGHTNESS: 0
VOMITING: 0
NAUSEA: 0
VISUAL CHANGE: 0
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
## 2018-02-07 ENCOUNTER — OFFICE VISIT (OUTPATIENT)
Dept: PHYSICAL MEDICINE AND REHAB | Age: 53
End: 2018-02-07
Payer: COMMERCIAL

## 2018-02-07 VITALS
BODY MASS INDEX: 28.41 KG/M2 | WEIGHT: 181 LBS | HEART RATE: 76 BPM | SYSTOLIC BLOOD PRESSURE: 123 MMHG | DIASTOLIC BLOOD PRESSURE: 74 MMHG | HEIGHT: 67 IN

## 2018-02-07 DIAGNOSIS — G89.29 CHRONIC BILATERAL LOW BACK PAIN, WITH SCIATICA PRESENCE UNSPECIFIED: ICD-10-CM

## 2018-02-07 DIAGNOSIS — M79.604 PAIN IN BOTH LOWER EXTREMITIES: ICD-10-CM

## 2018-02-07 DIAGNOSIS — M79.605 PAIN IN BOTH LOWER EXTREMITIES: ICD-10-CM

## 2018-02-07 DIAGNOSIS — M54.5 CHRONIC BILATERAL LOW BACK PAIN, WITH SCIATICA PRESENCE UNSPECIFIED: ICD-10-CM

## 2018-02-07 DIAGNOSIS — G89.4 CHRONIC PAIN SYNDROME: ICD-10-CM

## 2018-02-07 DIAGNOSIS — M48.062 SPINAL STENOSIS OF LUMBAR REGION WITH NEUROGENIC CLAUDICATION: ICD-10-CM

## 2018-02-07 DIAGNOSIS — M54.16 LUMBAR RADICULITIS: Primary | ICD-10-CM

## 2018-02-07 DIAGNOSIS — G62.9 NEUROPATHY: ICD-10-CM

## 2018-02-07 DIAGNOSIS — G82.54 INCOMPLETE QUADRIPLEGIA AT C5-C8 LEVEL (HCC): ICD-10-CM

## 2018-02-07 DIAGNOSIS — M47.816 SPONDYLOSIS OF LUMBAR REGION WITHOUT MYELOPATHY OR RADICULOPATHY: ICD-10-CM

## 2018-02-07 PROCEDURE — G8417 CALC BMI ABV UP PARAM F/U: HCPCS | Performed by: NURSE PRACTITIONER

## 2018-02-07 PROCEDURE — 1036F TOBACCO NON-USER: CPT | Performed by: NURSE PRACTITIONER

## 2018-02-07 PROCEDURE — G8427 DOCREV CUR MEDS BY ELIG CLIN: HCPCS | Performed by: NURSE PRACTITIONER

## 2018-02-07 PROCEDURE — 3017F COLORECTAL CA SCREEN DOC REV: CPT | Performed by: NURSE PRACTITIONER

## 2018-02-07 PROCEDURE — G8484 FLU IMMUNIZE NO ADMIN: HCPCS | Performed by: NURSE PRACTITIONER

## 2018-02-07 PROCEDURE — 3014F SCREEN MAMMO DOC REV: CPT | Performed by: NURSE PRACTITIONER

## 2018-02-07 PROCEDURE — 99214 OFFICE O/P EST MOD 30 MIN: CPT | Performed by: NURSE PRACTITIONER

## 2018-02-07 RX ORDER — HYDROCODONE BITARTRATE AND ACETAMINOPHEN 7.5; 325 MG/1; MG/1
1 TABLET ORAL EVERY 8 HOURS PRN
Qty: 90 TABLET | Refills: 0 | Status: SHIPPED | OUTPATIENT
Start: 2018-02-10 | End: 2018-03-12

## 2018-02-07 RX ORDER — MORPHINE SULFATE 15 MG/1
15 TABLET, FILM COATED, EXTENDED RELEASE ORAL 2 TIMES DAILY
Qty: 60 TABLET | Refills: 0 | Status: SHIPPED | OUTPATIENT
Start: 2018-02-07 | End: 2018-03-09

## 2018-02-07 RX ORDER — GABAPENTIN 800 MG/1
800 TABLET ORAL 3 TIMES DAILY
Qty: 90 TABLET | Refills: 0 | Status: SHIPPED | OUTPATIENT
Start: 2018-02-10 | End: 2018-03-14 | Stop reason: SDUPTHER

## 2018-02-07 ASSESSMENT — ENCOUNTER SYMPTOMS: BACK PAIN: 1

## 2018-02-07 NOTE — PROGRESS NOTES
plans and no homicidal plans. She exhibits normal recent memory and normal remote memory. She is attentive. Nursing note and vitals reviewed. Assessment:     1. Lumbar radiculitis    2. Spinal stenosis of lumbar region with neurogenic claudication    3. Chronic bilateral low back pain, with sciatica presence unspecified    4. 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 Incomplete quadriplegia at C5-C8 level ([facility] [address]    6. Neuropathy (HCC)    7. Pain in both lower extremities    8. Chronic pain syndrome            Plan:      · OARRS reviewed. · Discussed long term side effects of medications. · Discussed tolerance, dependency and addiction. · Previous UDS reviewed. · UDS preformed today for compliance. · Patient told can not receive any pain medications from any other source. · Discussed with pt.may not be pain free. · No evidence of abuse, diversion or aberrant behavior. · Medications and/or procedures improve function and quality of life. · Procedure notes reviewed in detail. · No relief from Bilateral L-facet MBB # 1, No relief from LESI   · Continues to have severe lower back pain with radicular symptoms. · Bilateral lower extremity EMG reviewed in detail, C-MRI, L-MRI, T-MRI reviewed   · Plan TLESI @ L5 left side only. Procedure and risks discussed in detail with patient. · Awaiting evaluation from Dr. Nickolas Unger for SCS trial- then will plan to move forward with trail   · Continue current pain medications- MS Contin 15 mg BID scheduled for maintenance pain relief, Norco 7.5 TID prn, Neurontin 800 mg TID   · Continue physical therapy, TENS unit     Meds. Prescribed:   Orders Placed This Encounter   Medications    HYDROcodone-acetaminophen (NORCO) 7.5-325 MG per tablet     Sig: Take 1 tablet by mouth every 8 hours as needed for Pain for up to 30 days.      Dispense:  90 tablet     Refill:  0    morphine (MS CONTIN) 15 MG extended release tablet     Sig: Take 1 tablet by

## 2018-02-12 ENCOUNTER — OFFICE VISIT (OUTPATIENT)
Dept: PSYCHIATRY | Age: 53
End: 2018-02-12
Payer: COMMERCIAL

## 2018-02-12 DIAGNOSIS — G82.54 INCOMPLETE QUADRIPLEGIA AT C5-C8 LEVEL (HCC): ICD-10-CM

## 2018-02-12 DIAGNOSIS — M79.604 PAIN IN BOTH LOWER EXTREMITIES: ICD-10-CM

## 2018-02-12 DIAGNOSIS — M54.5 CHRONIC MIDLINE LOW BACK PAIN, WITH SCIATICA PRESENCE UNSPECIFIED: ICD-10-CM

## 2018-02-12 DIAGNOSIS — G62.9 NEUROPATHY: ICD-10-CM

## 2018-02-12 DIAGNOSIS — T79.2XXD TRAUMATIC SEROMA OF LEFT THIGH, SUBSEQUENT ENCOUNTER: ICD-10-CM

## 2018-02-12 DIAGNOSIS — M25.532 LEFT WRIST PAIN: ICD-10-CM

## 2018-02-12 DIAGNOSIS — S70.02XS HEMATOMA OF LEFT HIP, SEQUELA: ICD-10-CM

## 2018-02-12 DIAGNOSIS — G89.4 CHRONIC PAIN DISORDER: ICD-10-CM

## 2018-02-12 DIAGNOSIS — M79.605 PAIN IN BOTH LOWER EXTREMITIES: ICD-10-CM

## 2018-02-12 DIAGNOSIS — G89.29 CHRONIC MIDLINE LOW BACK PAIN, WITH SCIATICA PRESENCE UNSPECIFIED: ICD-10-CM

## 2018-02-12 PROCEDURE — 1036F TOBACCO NON-USER: CPT | Performed by: PSYCHIATRY & NEUROLOGY

## 2018-02-12 PROCEDURE — 90792 PSYCH DIAG EVAL W/MED SRVCS: CPT | Performed by: PSYCHIATRY & NEUROLOGY

## 2018-02-12 RX ORDER — AMITRIPTYLINE HYDROCHLORIDE 50 MG/1
50 TABLET, FILM COATED ORAL NIGHTLY
Qty: 30 TABLET | Refills: 2 | Status: SHIPPED | OUTPATIENT
Start: 2018-02-12 | End: 2018-04-04 | Stop reason: SDUPTHER

## 2018-02-12 NOTE — PROGRESS NOTES
examiner:  cooperative, attentive and good eye contact  Speech:  spontaneous, normal rate, normal volume and well articulated  Mood:  euthymic  Affect:  mood congruent  Thought processes:  linear, goal directed and coherent  Thought content:  Homocidal ideation: none  Suicidal Ideation:  denies suicidal ideation  Delusions:  no evidence of delusions  Perceptual Disturbance:  denies any perceptual disturbance  Cognition:  oriented to person, place, and time  Concentration succeeded  Memory intact  Fund of knowledge:  good  Abstract thinking:  good  Insight:  good  Judgment:  good    DIAGNOSTIC IMPRESSION  Chronic pain disorder    Plan  No follow up needed  OK to undergo SCS from my perspective  Current Outpatient Prescriptions   Medication Sig Dispense Refill    amitriptyline (ELAVIL) 50 MG tablet take 1 tablet by mouth NIGHTLY 30 tablet 2    HYDROcodone-acetaminophen (NORCO) 7.5-325 MG per tablet Take 1 tablet by mouth every 8 hours as needed for Pain for up to 30 days. 90 tablet 0    morphine (MS CONTIN) 15 MG extended release tablet Take 1 tablet by mouth 2 times daily for 30 days. 60 tablet 0    gabapentin (NEURONTIN) 800 MG tablet Take 1 tablet by mouth 3 times daily for 30 days.  90 tablet 0    Ascorbic Acid (VITAMIN C PO) Take by mouth      BIOTIN PO Take by mouth      Prenatal Vit-Fe Fumarate-FA (PRENATAL ONE DAILY PO) Take by mouth      DULoxetine (CYMBALTA) 60 MG extended release capsule take 1 capsule by mouth once daily 30 capsule 1    Tens Unit MISC by Does not apply route 1 each 0    Multiple Vitamins-Minerals (THERAPEUTIC MULTIVITAMIN-MINERALS) tablet Take 1 tablet by mouth daily      butalbital-aspirin-caffeine (FIORINAL) -40 MG per capsule Take 1 capsule by mouth every 6 hours as needed for Headaches 20 capsule 0    ondansetron (ZOFRAN ODT) 4 MG disintegrating tablet Take 1 tablet by mouth every 8 hours as needed for Nausea 20 tablet 0    mirtazapine (REMERON) 15 MG tablet Take 1 tablet by mouth nightly 30 tablet 2    metFORMIN (GLUCOPHAGE) 500 MG tablet Take 1 tablet by mouth 2 times daily (with meals) 60 tablet 2    iron sucrose (VENOFER) 20 MG/ML injection Infuse 10 mLs intravenously every 3 days for 5 doses Infuse slowly over at least 5 minutes. 50 mL 0    vitamin D (ERGOCALCIFEROL) 29530 units CAPS capsule Take 1 capsule by mouth once a week for 12 doses 12 capsule 0    docusate sodium (COLACE) 100 MG capsule Take 100 mg by mouth as needed for Constipation      vitamin B-12 (CYANOCOBALAMIN) 500 MCG tablet Take 500 mcg by mouth daily       No current facility-administered medications for this visit.

## 2018-02-20 RX ORDER — DULOXETIN HYDROCHLORIDE 60 MG/1
CAPSULE, DELAYED RELEASE ORAL
Qty: 30 CAPSULE | Refills: 1 | Status: SHIPPED | OUTPATIENT
Start: 2018-02-20 | End: 2018-04-19 | Stop reason: SDUPTHER

## 2018-02-20 NOTE — TELEPHONE ENCOUNTER
Lata Davenport called requesting a refill on the following medications:  Requested Prescriptions     Pending Prescriptions Disp Refills    DULoxetine (CYMBALTA) 60 MG extended release capsule [Pharmacy Med Name: DULOXETINE HCL DR 60 MG CAP] 30 capsule 1     Sig: take 1 capsule by mouth once daily       Date of last visit: 2/6/2018  Date of next visit (if applicable):Visit date not found  Date of last refill: 01/15/2018 X 30  Pharmacy Name: [address] [place] [address]  No future appointment, last appointment 02/06/2018      Thanks,  Vishal Barrera, [address] [place], [place] [address]

## 2018-03-01 ENCOUNTER — OFFICE VISIT (OUTPATIENT)
Dept: PHYSICAL MEDICINE AND REHAB | Age: 53
End: 2018-03-01
Payer: COMMERCIAL

## 2018-03-01 ENCOUNTER — TELEPHONE (OUTPATIENT)
Dept: PHYSICAL MEDICINE AND REHAB | Age: 53
End: 2018-03-01

## 2018-03-01 VITALS
BODY MASS INDEX: 28.69 KG/M2 | WEIGHT: 182.8 LBS | DIASTOLIC BLOOD PRESSURE: 77 MMHG | HEART RATE: 65 BPM | HEIGHT: 67 IN | SYSTOLIC BLOOD PRESSURE: 130 MMHG

## 2018-03-01 DIAGNOSIS — M54.16 LUMBAR RADICULITIS: Primary | ICD-10-CM

## 2018-03-01 DIAGNOSIS — G82.54 INCOMPLETE QUADRIPLEGIA AT C5-C8 LEVEL (HCC): ICD-10-CM

## 2018-03-01 DIAGNOSIS — M47.816 SPONDYLOSIS OF LUMBAR REGION WITHOUT MYELOPATHY OR RADICULOPATHY: ICD-10-CM

## 2018-03-01 DIAGNOSIS — M48.062 SPINAL STENOSIS OF LUMBAR REGION WITH NEUROGENIC CLAUDICATION: ICD-10-CM

## 2018-03-01 DIAGNOSIS — M79.605 PAIN IN BOTH LOWER EXTREMITIES: ICD-10-CM

## 2018-03-01 DIAGNOSIS — G89.29 CHRONIC BILATERAL LOW BACK PAIN, WITH SCIATICA PRESENCE UNSPECIFIED: ICD-10-CM

## 2018-03-01 DIAGNOSIS — M79.604 PAIN IN BOTH LOWER EXTREMITIES: ICD-10-CM

## 2018-03-01 DIAGNOSIS — G62.9 NEUROPATHY: ICD-10-CM

## 2018-03-01 DIAGNOSIS — G89.4 CHRONIC PAIN SYNDROME: ICD-10-CM

## 2018-03-01 DIAGNOSIS — M54.5 CHRONIC BILATERAL LOW BACK PAIN, WITH SCIATICA PRESENCE UNSPECIFIED: ICD-10-CM

## 2018-03-01 PROCEDURE — G8427 DOCREV CUR MEDS BY ELIG CLIN: HCPCS | Performed by: NURSE PRACTITIONER

## 2018-03-01 PROCEDURE — G8417 CALC BMI ABV UP PARAM F/U: HCPCS | Performed by: NURSE PRACTITIONER

## 2018-03-01 PROCEDURE — G8484 FLU IMMUNIZE NO ADMIN: HCPCS | Performed by: NURSE PRACTITIONER

## 2018-03-01 PROCEDURE — 3017F COLORECTAL CA SCREEN DOC REV: CPT | Performed by: NURSE PRACTITIONER

## 2018-03-01 PROCEDURE — 3014F SCREEN MAMMO DOC REV: CPT | Performed by: NURSE PRACTITIONER

## 2018-03-01 PROCEDURE — 1036F TOBACCO NON-USER: CPT | Performed by: NURSE PRACTITIONER

## 2018-03-01 PROCEDURE — 99213 OFFICE O/P EST LOW 20 MIN: CPT | Performed by: NURSE PRACTITIONER

## 2018-03-01 ASSESSMENT — ENCOUNTER SYMPTOMS: BACK PAIN: 1

## 2018-03-01 NOTE — PROGRESS NOTES
D deficiency. Past Surgical History  The patient  has a past surgical history that includes Gastric bypass surgery (); Hysterectomy ();  section, low transverse; Cholecystectomy; Tonsillectomy; Foot surgery (3/3/15); bladder suspension (11); Upper gastrointestinal endoscopy (5 20 15); Esophagus dilation (5 20 15); Neck surgery (2015); other surgical history (2015); other surgical history (6/10/2016); other surgical history (Left, 2016); Leg Surgery (Left, 2017); other surgical history (10/16/2017); pr njx dx/ther sbst epidural/subarach lumbar/sacral (N/A, 10/16/2017); other surgical history (Bilateral, 2017); and Nerve Block Lumb Facet Level 1 Bilateral (Bilateral, 2017). Family History  This patient's family history includes Cancer in her brother and father; Diabetes in her mother; Heart Disease in her mother; High Blood Pressure in her father. Social History  Lorena Boucher  reports that she quit smoking about 17 years ago. She has a 23.00 pack-year smoking history. She has never used smokeless tobacco. She reports that she does not drink alcohol or use drugs. Medications    Current Outpatient Prescriptions:     DULoxetine (CYMBALTA) 60 MG extended release capsule, take 1 capsule by mouth once daily, Disp: 30 capsule, Rfl: 1    amitriptyline (ELAVIL) 50 MG tablet, take 1 tablet by mouth NIGHTLY, Disp: 30 tablet, Rfl: 2    HYDROcodone-acetaminophen (NORCO) 7.5-325 MG per tablet, Take 1 tablet by mouth every 8 hours as needed for Pain for up to 30 days. , Disp: 90 tablet, Rfl: 0    morphine (MS CONTIN) 15 MG extended release tablet, Take 1 tablet by mouth 2 times daily for 30 days. , Disp: 60 tablet, Rfl: 0    gabapentin (NEURONTIN) 800 MG tablet, Take 1 tablet by mouth 3 times daily for 30 days. , Disp: 90 tablet, Rfl: 0    Ascorbic Acid (VITAMIN C PO), Take by mouth, Disp: , Rfl:     BIOTIN PO, Take by mouth, Disp: , Rfl:     Prenatal Vit-Fe Fumarate-FA exudate. Eyes: Conjunctivae and EOM are normal. Pupils are equal, round, and reactive to light. Right eye exhibits no discharge. Left eye exhibits no discharge. No scleral icterus. Neck: Normal range of motion and full passive range of motion without pain. Neck supple. No muscular tenderness present. No neck rigidity. No edema, no erythema and normal range of motion present. No thyromegaly present. Cardiovascular: Normal rate, regular rhythm, normal heart sounds and intact distal pulses. Exam reveals no gallop and no friction rub. No murmur heard. Pulmonary/Chest: Effort normal and breath sounds normal. No respiratory distress. She has no wheezes. She has no rales. She exhibits no tenderness. Abdominal: Soft. Bowel sounds are normal. She exhibits no distension. There is no tenderness. There is no rebound and no guarding. Musculoskeletal: She exhibits tenderness. Right shoulder: She exhibits decreased range of motion and tenderness. Left shoulder: She exhibits decreased range of motion and tenderness. Right elbow: Tenderness found. Left elbow: Tenderness found. Right wrist: She exhibits tenderness. Left wrist: She exhibits tenderness. Right hip: She exhibits tenderness. Left hip: She exhibits tenderness. Right knee: Tenderness found. Left knee: She exhibits decreased range of motion and swelling. Tenderness found. Right ankle: She exhibits swelling. Tenderness. Left ankle: She exhibits decreased range of motion and swelling. Tenderness. Cervical back: She exhibits decreased range of motion, tenderness, pain and spasm. Thoracic back: She exhibits tenderness. Lumbar back: She exhibits tenderness, pain and spasm. Back:         Right upper arm: She exhibits tenderness. Left upper arm: She exhibits tenderness. Right forearm: She exhibits tenderness.         Left forearm: She exhibits

## 2018-03-12 ENCOUNTER — ANESTHESIA (OUTPATIENT)
Dept: OPERATING ROOM | Age: 53
End: 2018-03-12
Payer: COMMERCIAL

## 2018-03-12 ENCOUNTER — ANESTHESIA EVENT (OUTPATIENT)
Dept: OPERATING ROOM | Age: 53
End: 2018-03-12
Payer: COMMERCIAL

## 2018-03-12 ENCOUNTER — APPOINTMENT (OUTPATIENT)
Dept: GENERAL RADIOLOGY | Age: 53
End: 2018-03-12
Attending: PAIN MEDICINE
Payer: COMMERCIAL

## 2018-03-12 ENCOUNTER — HOSPITAL ENCOUNTER (OUTPATIENT)
Age: 53
Setting detail: OUTPATIENT SURGERY
Discharge: HOME | End: 2018-03-12
Attending: PAIN MEDICINE | Admitting: PAIN MEDICINE
Payer: COMMERCIAL

## 2018-03-12 VITALS
RESPIRATION RATE: 6 BRPM | SYSTOLIC BLOOD PRESSURE: 146 MMHG | OXYGEN SATURATION: 100 % | DIASTOLIC BLOOD PRESSURE: 74 MMHG

## 2018-03-12 VITALS
BODY MASS INDEX: 27.56 KG/M2 | HEART RATE: 73 BPM | WEIGHT: 175.6 LBS | DIASTOLIC BLOOD PRESSURE: 64 MMHG | HEIGHT: 67 IN | OXYGEN SATURATION: 99 % | TEMPERATURE: 98.2 F | SYSTOLIC BLOOD PRESSURE: 142 MMHG | RESPIRATION RATE: 16 BRPM

## 2018-03-12 LAB — GLUCOSE BLD-MCNC: 78 MG/DL (ref 70–108)

## 2018-03-12 PROCEDURE — 3209999900 FLUORO FOR SURGICAL PROCEDURES

## 2018-03-12 PROCEDURE — 82948 REAGENT STRIP/BLOOD GLUCOSE: CPT

## 2018-03-12 PROCEDURE — 3700000000 HC ANESTHESIA ATTENDED CARE: Performed by: PAIN MEDICINE

## 2018-03-12 PROCEDURE — 7100000011 HC PHASE II RECOVERY - ADDTL 15 MIN: Performed by: PAIN MEDICINE

## 2018-03-12 PROCEDURE — 6360000002 HC RX W HCPCS: Performed by: PAIN MEDICINE

## 2018-03-12 PROCEDURE — 3600000055 HC PAIN LEVEL 3 ADDL 15 MIN: Performed by: PAIN MEDICINE

## 2018-03-12 PROCEDURE — 3700000001 HC ADD 15 MINUTES (ANESTHESIA): Performed by: PAIN MEDICINE

## 2018-03-12 PROCEDURE — 2500000003 HC RX 250 WO HCPCS: Performed by: PAIN MEDICINE

## 2018-03-12 PROCEDURE — 3600000054 HC PAIN LEVEL 3 BASE: Performed by: PAIN MEDICINE

## 2018-03-12 PROCEDURE — 64483 NJX AA&/STRD TFRM EPI L/S 1: CPT | Performed by: PAIN MEDICINE

## 2018-03-12 PROCEDURE — 6360000004 HC RX CONTRAST MEDICATION: Performed by: PAIN MEDICINE

## 2018-03-12 PROCEDURE — 6360000002 HC RX W HCPCS: Performed by: NURSE ANESTHETIST, CERTIFIED REGISTERED

## 2018-03-12 PROCEDURE — 7100000010 HC PHASE II RECOVERY - FIRST 15 MIN: Performed by: PAIN MEDICINE

## 2018-03-12 PROCEDURE — 2580000003 HC RX 258: Performed by: NURSE ANESTHETIST, CERTIFIED REGISTERED

## 2018-03-12 RX ORDER — 0.9 % SODIUM CHLORIDE 0.9 %
VIAL (ML) INJECTION PRN
Status: DISCONTINUED | OUTPATIENT
Start: 2018-03-12 | End: 2018-03-12 | Stop reason: SDUPTHER

## 2018-03-12 RX ORDER — LIDOCAINE HYDROCHLORIDE 10 MG/ML
INJECTION, SOLUTION INFILTRATION; PERINEURAL PRN
Status: DISCONTINUED | OUTPATIENT
Start: 2018-03-12 | End: 2018-03-12 | Stop reason: HOSPADM

## 2018-03-12 RX ORDER — DEXAMETHASONE SODIUM PHOSPHATE 4 MG/ML
INJECTION, SOLUTION INTRA-ARTICULAR; INTRALESIONAL; INTRAMUSCULAR; INTRAVENOUS; SOFT TISSUE PRN
Status: DISCONTINUED | OUTPATIENT
Start: 2018-03-12 | End: 2018-03-12 | Stop reason: HOSPADM

## 2018-03-12 RX ORDER — FENTANYL CITRATE 50 UG/ML
INJECTION, SOLUTION INTRAMUSCULAR; INTRAVENOUS PRN
Status: DISCONTINUED | OUTPATIENT
Start: 2018-03-12 | End: 2018-03-12 | Stop reason: SDUPTHER

## 2018-03-12 RX ORDER — BUPIVACAINE HYDROCHLORIDE 2.5 MG/ML
INJECTION, SOLUTION EPIDURAL; INFILTRATION; INTRACAUDAL PRN
Status: DISCONTINUED | OUTPATIENT
Start: 2018-03-12 | End: 2018-03-12 | Stop reason: HOSPADM

## 2018-03-12 RX ADMIN — FENTANYL CITRATE 50 MCG: 50 INJECTION INTRAMUSCULAR; INTRAVENOUS at 09:40

## 2018-03-12 RX ADMIN — SODIUM CHLORIDE 5 ML: 9 INJECTION, SOLUTION INTRAMUSCULAR; INTRAVENOUS; SUBCUTANEOUS at 09:59

## 2018-03-12 RX ADMIN — FENTANYL CITRATE 50 MCG: 50 INJECTION INTRAMUSCULAR; INTRAVENOUS at 09:59

## 2018-03-12 RX ADMIN — SODIUM CHLORIDE 5 ML: 9 INJECTION, SOLUTION INTRAMUSCULAR; INTRAVENOUS; SUBCUTANEOUS at 09:40

## 2018-03-12 ASSESSMENT — PULMONARY FUNCTION TESTS
PIF_VALUE: 0

## 2018-03-12 ASSESSMENT — PAIN SCALES - GENERAL: PAINLEVEL_OUTOF10: 0

## 2018-03-12 ASSESSMENT — PAIN DESCRIPTION - DESCRIPTORS: DESCRIPTORS: BURNING

## 2018-03-12 ASSESSMENT — PAIN - FUNCTIONAL ASSESSMENT: PAIN_FUNCTIONAL_ASSESSMENT: 0-10
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
## 2018-03-14 RX ORDER — GABAPENTIN 800 MG/1
TABLET ORAL
Qty: 90 TABLET | Refills: 0 | Status: SHIPPED | OUTPATIENT
Start: 2018-03-14 | End: 2018-04-14 | Stop reason: SDUPTHER

## 2018-03-16 DIAGNOSIS — G89.4 CHRONIC PAIN SYNDROME: Primary | ICD-10-CM

## 2018-03-16 RX ORDER — HYDROCODONE BITARTRATE AND ACETAMINOPHEN 7.5; 325 MG/1; MG/1
1 TABLET ORAL 3 TIMES DAILY PRN
Qty: 90 TABLET | Refills: 0 | Status: SHIPPED | OUTPATIENT
Start: 2018-03-16 | End: 2018-04-15

## 2018-03-16 RX ORDER — MORPHINE SULFATE 15 MG/1
15 TABLET, FILM COATED, EXTENDED RELEASE ORAL EVERY 12 HOURS
Qty: 60 TABLET | Refills: 0 | Status: SHIPPED | OUTPATIENT
Start: 2018-03-16 | End: 2018-04-15

## 2018-04-04 ENCOUNTER — OFFICE VISIT (OUTPATIENT)
Dept: PHYSICAL MEDICINE AND REHAB | Age: 53
End: 2018-04-04
Payer: COMMERCIAL

## 2018-04-04 VITALS
BODY MASS INDEX: 27.47 KG/M2 | DIASTOLIC BLOOD PRESSURE: 73 MMHG | WEIGHT: 175 LBS | HEART RATE: 67 BPM | HEIGHT: 67 IN | SYSTOLIC BLOOD PRESSURE: 144 MMHG

## 2018-04-04 DIAGNOSIS — M79.604 PAIN IN BOTH LOWER EXTREMITIES: ICD-10-CM

## 2018-04-04 DIAGNOSIS — G82.54 INCOMPLETE QUADRIPLEGIA AT C5-C8 LEVEL (HCC): ICD-10-CM

## 2018-04-04 DIAGNOSIS — G89.4 CHRONIC PAIN SYNDROME: ICD-10-CM

## 2018-04-04 DIAGNOSIS — M54.5 CHRONIC BILATERAL LOW BACK PAIN, WITH SCIATICA PRESENCE UNSPECIFIED: ICD-10-CM

## 2018-04-04 DIAGNOSIS — M54.16 LUMBAR RADICULITIS: Primary | ICD-10-CM

## 2018-04-04 DIAGNOSIS — G89.29 CHRONIC BILATERAL LOW BACK PAIN, WITH SCIATICA PRESENCE UNSPECIFIED: ICD-10-CM

## 2018-04-04 DIAGNOSIS — G62.9 NEUROPATHY: ICD-10-CM

## 2018-04-04 DIAGNOSIS — M47.816 SPONDYLOSIS OF LUMBAR REGION WITHOUT MYELOPATHY OR RADICULOPATHY: ICD-10-CM

## 2018-04-04 DIAGNOSIS — M79.605 PAIN IN BOTH LOWER EXTREMITIES: ICD-10-CM

## 2018-04-04 DIAGNOSIS — M48.062 SPINAL STENOSIS OF LUMBAR REGION WITH NEUROGENIC CLAUDICATION: ICD-10-CM

## 2018-04-04 PROCEDURE — G8427 DOCREV CUR MEDS BY ELIG CLIN: HCPCS | Performed by: NURSE PRACTITIONER

## 2018-04-04 PROCEDURE — 3017F COLORECTAL CA SCREEN DOC REV: CPT | Performed by: NURSE PRACTITIONER

## 2018-04-04 PROCEDURE — G8417 CALC BMI ABV UP PARAM F/U: HCPCS | Performed by: NURSE PRACTITIONER

## 2018-04-04 PROCEDURE — 3014F SCREEN MAMMO DOC REV: CPT | Performed by: NURSE PRACTITIONER

## 2018-04-04 PROCEDURE — 1036F TOBACCO NON-USER: CPT | Performed by: NURSE PRACTITIONER

## 2018-04-04 PROCEDURE — 99213 OFFICE O/P EST LOW 20 MIN: CPT | Performed by: NURSE PRACTITIONER

## 2018-04-04 RX ORDER — AMITRIPTYLINE HYDROCHLORIDE 50 MG/1
50 TABLET, FILM COATED ORAL NIGHTLY
COMMUNITY
End: 2018-09-04 | Stop reason: SDUPTHER

## 2018-04-04 ASSESSMENT — ENCOUNTER SYMPTOMS: BACK PAIN: 1

## 2018-04-16 DIAGNOSIS — G89.4 CHRONIC PAIN SYNDROME: Primary | ICD-10-CM

## 2018-04-16 RX ORDER — HYDROCODONE BITARTRATE AND ACETAMINOPHEN 7.5; 325 MG/1; MG/1
1 TABLET ORAL EVERY 8 HOURS PRN
Qty: 90 TABLET | Refills: 0 | Status: SHIPPED | OUTPATIENT
Start: 2018-04-16 | End: 2018-05-14 | Stop reason: SDUPTHER

## 2018-04-16 RX ORDER — GABAPENTIN 800 MG/1
TABLET ORAL
Qty: 90 TABLET | Refills: 0 | Status: SHIPPED | OUTPATIENT
Start: 2018-04-16 | End: 2018-05-14 | Stop reason: SDUPTHER

## 2018-04-16 RX ORDER — MORPHINE SULFATE 15 MG/1
15 TABLET, FILM COATED, EXTENDED RELEASE ORAL EVERY 12 HOURS
Qty: 60 TABLET | Refills: 0 | Status: SHIPPED | OUTPATIENT
Start: 2018-04-16 | End: 2018-05-14 | Stop reason: SDUPTHER

## 2018-04-19 RX ORDER — DULOXETIN HYDROCHLORIDE 60 MG/1
CAPSULE, DELAYED RELEASE ORAL
Qty: 30 CAPSULE | Refills: 1 | Status: SHIPPED | OUTPATIENT
Start: 2018-04-19 | End: 2018-05-14 | Stop reason: SDUPTHER

## 2018-04-30 ENCOUNTER — OFFICE VISIT (OUTPATIENT)
Dept: NEUROLOGY | Age: 53
End: 2018-04-30
Payer: COMMERCIAL

## 2018-04-30 VITALS
SYSTOLIC BLOOD PRESSURE: 130 MMHG | BODY MASS INDEX: 28.53 KG/M2 | DIASTOLIC BLOOD PRESSURE: 68 MMHG | WEIGHT: 181.8 LBS | HEART RATE: 66 BPM | HEIGHT: 67 IN

## 2018-04-30 DIAGNOSIS — M47.14 THORACIC MYELOPATHY: Primary | ICD-10-CM

## 2018-04-30 DIAGNOSIS — R20.0 NUMBNESS: ICD-10-CM

## 2018-04-30 DIAGNOSIS — R26.0 ATAXIC GAIT: ICD-10-CM

## 2018-04-30 DIAGNOSIS — R20.2 NUMBNESS AND TINGLING OF BOTH LEGS: ICD-10-CM

## 2018-04-30 DIAGNOSIS — R29.898 LEFT LEG WEAKNESS: ICD-10-CM

## 2018-04-30 DIAGNOSIS — R20.0 NUMBNESS AND TINGLING OF BOTH LEGS: ICD-10-CM

## 2018-04-30 PROCEDURE — G8427 DOCREV CUR MEDS BY ELIG CLIN: HCPCS | Performed by: PSYCHIATRY & NEUROLOGY

## 2018-04-30 PROCEDURE — 1036F TOBACCO NON-USER: CPT | Performed by: PSYCHIATRY & NEUROLOGY

## 2018-04-30 PROCEDURE — 99213 OFFICE O/P EST LOW 20 MIN: CPT | Performed by: PSYCHIATRY & NEUROLOGY

## 2018-04-30 PROCEDURE — G8417 CALC BMI ABV UP PARAM F/U: HCPCS | Performed by: PSYCHIATRY & NEUROLOGY

## 2018-04-30 PROCEDURE — 3017F COLORECTAL CA SCREEN DOC REV: CPT | Performed by: PSYCHIATRY & NEUROLOGY

## 2018-05-14 DIAGNOSIS — G89.4 CHRONIC PAIN SYNDROME: ICD-10-CM

## 2018-05-14 RX ORDER — MIRTAZAPINE 15 MG/1
15 TABLET, FILM COATED ORAL NIGHTLY
Qty: 30 TABLET | Refills: 2 | Status: SHIPPED | OUTPATIENT
Start: 2018-05-14 | End: 2018-07-21 | Stop reason: SDUPTHER

## 2018-05-14 RX ORDER — MORPHINE SULFATE 15 MG/1
15 TABLET, FILM COATED, EXTENDED RELEASE ORAL EVERY 12 HOURS
Qty: 60 TABLET | Refills: 0 | Status: SHIPPED | OUTPATIENT
Start: 2018-05-15 | End: 2018-06-07 | Stop reason: SDUPTHER

## 2018-05-14 RX ORDER — HYDROCODONE BITARTRATE AND ACETAMINOPHEN 7.5; 325 MG/1; MG/1
1 TABLET ORAL EVERY 8 HOURS PRN
Qty: 90 TABLET | Refills: 0 | Status: SHIPPED | OUTPATIENT
Start: 2018-05-15 | End: 2018-06-07 | Stop reason: SDUPTHER

## 2018-05-14 RX ORDER — GABAPENTIN 800 MG/1
TABLET ORAL
Qty: 90 TABLET | Refills: 0 | Status: SHIPPED | OUTPATIENT
Start: 2018-05-14 | End: 2018-06-07 | Stop reason: SDUPTHER

## 2018-05-14 RX ORDER — DULOXETIN HYDROCHLORIDE 60 MG/1
CAPSULE, DELAYED RELEASE ORAL
Qty: 30 CAPSULE | Refills: 1 | Status: SHIPPED | OUTPATIENT
Start: 2018-05-14 | End: 2018-08-19 | Stop reason: SDUPTHER

## 2018-06-04 ENCOUNTER — ANESTHESIA (OUTPATIENT)
Dept: OPERATING ROOM | Age: 53
End: 2018-06-04
Payer: MEDICARE

## 2018-06-04 ENCOUNTER — APPOINTMENT (OUTPATIENT)
Dept: GENERAL RADIOLOGY | Age: 53
End: 2018-06-04
Attending: PAIN MEDICINE
Payer: MEDICARE

## 2018-06-04 ENCOUNTER — HOSPITAL ENCOUNTER (OUTPATIENT)
Age: 53
Setting detail: OUTPATIENT SURGERY
Discharge: HOME | End: 2018-06-04
Attending: PAIN MEDICINE | Admitting: PAIN MEDICINE
Payer: MEDICARE

## 2018-06-04 ENCOUNTER — ANESTHESIA EVENT (OUTPATIENT)
Dept: OPERATING ROOM | Age: 53
End: 2018-06-04
Payer: MEDICARE

## 2018-06-04 VITALS
RESPIRATION RATE: 9 BRPM | SYSTOLIC BLOOD PRESSURE: 102 MMHG | DIASTOLIC BLOOD PRESSURE: 64 MMHG | OXYGEN SATURATION: 96 %

## 2018-06-04 VITALS
HEART RATE: 52 BPM | RESPIRATION RATE: 16 BRPM | DIASTOLIC BLOOD PRESSURE: 58 MMHG | TEMPERATURE: 98.4 F | SYSTOLIC BLOOD PRESSURE: 106 MMHG | OXYGEN SATURATION: 95 % | BODY MASS INDEX: 29.32 KG/M2 | WEIGHT: 186.8 LBS | HEIGHT: 67 IN

## 2018-06-04 LAB — GLUCOSE BLD-MCNC: 86 MG/DL (ref 70–108)

## 2018-06-04 PROCEDURE — C1778 LEAD, NEUROSTIMULATOR: HCPCS | Performed by: PAIN MEDICINE

## 2018-06-04 PROCEDURE — 82948 REAGENT STRIP/BLOOD GLUCOSE: CPT

## 2018-06-04 PROCEDURE — 6360000002 HC RX W HCPCS: Performed by: NURSE ANESTHETIST, CERTIFIED REGISTERED

## 2018-06-04 PROCEDURE — 3700000001 HC ADD 15 MINUTES (ANESTHESIA): Performed by: PAIN MEDICINE

## 2018-06-04 PROCEDURE — 3209999900 FLUORO FOR SURGICAL PROCEDURES

## 2018-06-04 PROCEDURE — 6360000004 HC RX CONTRAST MEDICATION: Performed by: PAIN MEDICINE

## 2018-06-04 PROCEDURE — 2720000010 HC SURG SUPPLY STERILE: Performed by: PAIN MEDICINE

## 2018-06-04 PROCEDURE — 7100000011 HC PHASE II RECOVERY - ADDTL 15 MIN: Performed by: PAIN MEDICINE

## 2018-06-04 PROCEDURE — 3600000014 HC SURGERY LEVEL 4 ADDTL 15MIN: Performed by: PAIN MEDICINE

## 2018-06-04 PROCEDURE — 7100000010 HC PHASE II RECOVERY - FIRST 15 MIN: Performed by: PAIN MEDICINE

## 2018-06-04 PROCEDURE — 3700000000 HC ANESTHESIA ATTENDED CARE: Performed by: PAIN MEDICINE

## 2018-06-04 PROCEDURE — 2500000003 HC RX 250 WO HCPCS: Performed by: PAIN MEDICINE

## 2018-06-04 PROCEDURE — 3600000004 HC SURGERY LEVEL 4 BASE: Performed by: PAIN MEDICINE

## 2018-06-04 PROCEDURE — 95972 ALYS CPLX SP/PN NPGT W/PRGRM: CPT | Performed by: PAIN MEDICINE

## 2018-06-04 PROCEDURE — 63650 IMPLANT NEUROELECTRODES: CPT | Performed by: PAIN MEDICINE

## 2018-06-04 PROCEDURE — 6370000000 HC RX 637 (ALT 250 FOR IP)

## 2018-06-04 DEVICE — 50CM 16 CONTACT TRIAL LEAD KIT
Type: IMPLANTABLE DEVICE | Status: FUNCTIONAL
Brand: INFINION™  16

## 2018-06-04 RX ORDER — PROPOFOL 10 MG/ML
INJECTION, EMULSION INTRAVENOUS PRN
Status: DISCONTINUED | OUTPATIENT
Start: 2018-06-04 | End: 2018-06-04 | Stop reason: SDUPTHER

## 2018-06-04 RX ORDER — CEFAZOLIN SODIUM 1 G/3ML
INJECTION, POWDER, FOR SOLUTION INTRAMUSCULAR; INTRAVENOUS PRN
Status: DISCONTINUED | OUTPATIENT
Start: 2018-06-04 | End: 2018-06-04 | Stop reason: SDUPTHER

## 2018-06-04 RX ORDER — HYDROCODONE BITARTRATE AND ACETAMINOPHEN 7.5; 325 MG/1; MG/1
TABLET ORAL
Status: COMPLETED
Start: 2018-06-04 | End: 2018-06-04

## 2018-06-04 RX ORDER — LIDOCAINE HYDROCHLORIDE 10 MG/ML
INJECTION, SOLUTION INFILTRATION; PERINEURAL PRN
Status: DISCONTINUED | OUTPATIENT
Start: 2018-06-04 | End: 2018-06-04 | Stop reason: HOSPADM

## 2018-06-04 RX ORDER — HYDROCODONE BITARTRATE AND ACETAMINOPHEN 7.5; 325 MG/1; MG/1
1 TABLET ORAL ONCE
Status: COMPLETED | OUTPATIENT
Start: 2018-06-04 | End: 2018-06-04

## 2018-06-04 RX ORDER — FENTANYL CITRATE 50 UG/ML
INJECTION, SOLUTION INTRAMUSCULAR; INTRAVENOUS PRN
Status: DISCONTINUED | OUTPATIENT
Start: 2018-06-04 | End: 2018-06-04 | Stop reason: SDUPTHER

## 2018-06-04 RX ADMIN — PROPOFOL 20 MG: 10 INJECTION, EMULSION INTRAVENOUS at 08:41

## 2018-06-04 RX ADMIN — HYDROCODONE BITARTRATE AND ACETAMINOPHEN 1 TABLET: 7.5; 325 TABLET ORAL at 09:50

## 2018-06-04 RX ADMIN — PROPOFOL 50 MG: 10 INJECTION, EMULSION INTRAVENOUS at 07:50

## 2018-06-04 RX ADMIN — FENTANYL CITRATE 50 MCG: 50 INJECTION INTRAMUSCULAR; INTRAVENOUS at 07:50

## 2018-06-04 RX ADMIN — PROPOFOL 50 MG: 10 INJECTION, EMULSION INTRAVENOUS at 07:55

## 2018-06-04 RX ADMIN — FENTANYL CITRATE 50 MCG: 50 INJECTION INTRAMUSCULAR; INTRAVENOUS at 08:41

## 2018-06-04 RX ADMIN — PROPOFOL 50 MG: 10 INJECTION, EMULSION INTRAVENOUS at 08:31

## 2018-06-04 RX ADMIN — CEFAZOLIN 1000 MG: 1 INJECTION, POWDER, FOR SOLUTION INTRAMUSCULAR; INTRAVENOUS; PARENTERAL at 07:50

## 2018-06-04 ASSESSMENT — PULMONARY FUNCTION TESTS
PIF_VALUE: 0

## 2018-06-04 ASSESSMENT — PAIN DESCRIPTION - DESCRIPTORS: DESCRIPTORS: ACHING

## 2018-06-04 ASSESSMENT — PAIN SCALES - GENERAL
PAINLEVEL_OUTOF10: 8
PAINLEVEL_OUTOF10: 3

## 2018-06-04 ASSESSMENT — PAIN DESCRIPTION - LOCATION: LOCATION: BACK

## 2018-06-04 ASSESSMENT — PAIN - FUNCTIONAL ASSESSMENT: PAIN_FUNCTIONAL_ASSESSMENT: 0-10

## 2018-06-04 ASSESSMENT — PAIN DESCRIPTION - PAIN TYPE: TYPE: CHRONIC PAIN

## 2018-06-07 ENCOUNTER — OFFICE VISIT (OUTPATIENT)
Dept: PHYSICAL MEDICINE AND REHAB | Age: 53
End: 2018-06-07

## 2018-06-07 VITALS
DIASTOLIC BLOOD PRESSURE: 69 MMHG | WEIGHT: 186 LBS | HEART RATE: 61 BPM | BODY MASS INDEX: 29.19 KG/M2 | SYSTOLIC BLOOD PRESSURE: 124 MMHG | HEIGHT: 67 IN

## 2018-06-07 DIAGNOSIS — M54.5 CHRONIC BILATERAL LOW BACK PAIN, WITH SCIATICA PRESENCE UNSPECIFIED: ICD-10-CM

## 2018-06-07 DIAGNOSIS — G89.4 CHRONIC PAIN SYNDROME: ICD-10-CM

## 2018-06-07 DIAGNOSIS — G89.29 CHRONIC BILATERAL LOW BACK PAIN, WITH SCIATICA PRESENCE UNSPECIFIED: ICD-10-CM

## 2018-06-07 DIAGNOSIS — M79.604 PAIN IN BOTH LOWER EXTREMITIES: ICD-10-CM

## 2018-06-07 DIAGNOSIS — M79.605 PAIN IN BOTH LOWER EXTREMITIES: ICD-10-CM

## 2018-06-07 DIAGNOSIS — M54.16 LUMBAR RADICULITIS: Primary | ICD-10-CM

## 2018-06-07 DIAGNOSIS — G62.9 NEUROPATHY: ICD-10-CM

## 2018-06-07 DIAGNOSIS — M48.062 SPINAL STENOSIS OF LUMBAR REGION WITH NEUROGENIC CLAUDICATION: ICD-10-CM

## 2018-06-07 DIAGNOSIS — G82.54 INCOMPLETE QUADRIPLEGIA AT C5-C8 LEVEL (HCC): ICD-10-CM

## 2018-06-07 PROCEDURE — 99024 POSTOP FOLLOW-UP VISIT: CPT | Performed by: NURSE PRACTITIONER

## 2018-06-07 RX ORDER — GABAPENTIN 800 MG/1
TABLET ORAL
Qty: 90 TABLET | Refills: 0 | Status: SHIPPED | OUTPATIENT
Start: 2018-06-07 | End: 2018-06-20 | Stop reason: SDUPTHER

## 2018-06-07 RX ORDER — MORPHINE SULFATE 15 MG/1
15 TABLET, FILM COATED, EXTENDED RELEASE ORAL EVERY 12 HOURS
Qty: 60 TABLET | Refills: 0 | Status: SHIPPED | OUTPATIENT
Start: 2018-06-14 | End: 2018-07-14

## 2018-06-07 RX ORDER — HYDROCODONE BITARTRATE AND ACETAMINOPHEN 7.5; 325 MG/1; MG/1
1 TABLET ORAL EVERY 8 HOURS PRN
Qty: 90 TABLET | Refills: 0 | Status: SHIPPED | OUTPATIENT
Start: 2018-06-14 | End: 2018-07-14

## 2018-06-07 ASSESSMENT — ENCOUNTER SYMPTOMS: BACK PAIN: 1

## 2018-06-20 RX ORDER — GABAPENTIN 800 MG/1
TABLET ORAL
Qty: 90 TABLET | Refills: 0 | Status: SHIPPED | OUTPATIENT
Start: 2018-06-20 | End: 2018-08-12 | Stop reason: SDUPTHER

## 2018-07-02 ENCOUNTER — HOSPITAL ENCOUNTER (EMERGENCY)
Dept: GENERAL RADIOLOGY | Age: 53
Discharge: HOME | End: 2018-07-02
Payer: MEDICARE

## 2018-07-02 ENCOUNTER — HOSPITAL ENCOUNTER (EMERGENCY)
Age: 53
Discharge: HOME | End: 2018-07-02
Payer: MEDICARE

## 2018-07-02 VITALS
OXYGEN SATURATION: 96 % | SYSTOLIC BLOOD PRESSURE: 157 MMHG | RESPIRATION RATE: 18 BRPM | HEART RATE: 74 BPM | TEMPERATURE: 98.1 F | DIASTOLIC BLOOD PRESSURE: 81 MMHG

## 2018-07-02 DIAGNOSIS — J20.9 ACUTE BRONCHITIS, UNSPECIFIED ORGANISM: Primary | ICD-10-CM

## 2018-07-02 PROCEDURE — 6370000000 HC RX 637 (ALT 250 FOR IP): Performed by: NURSE PRACTITIONER

## 2018-07-02 PROCEDURE — 71046 X-RAY EXAM CHEST 2 VIEWS: CPT

## 2018-07-02 PROCEDURE — 94640 AIRWAY INHALATION TREATMENT: CPT

## 2018-07-02 PROCEDURE — 99214 OFFICE O/P EST MOD 30 MIN: CPT | Performed by: NURSE PRACTITIONER

## 2018-07-02 PROCEDURE — 99213 OFFICE O/P EST LOW 20 MIN: CPT

## 2018-07-02 RX ORDER — DOXYCYCLINE HYCLATE 100 MG
100 TABLET ORAL 2 TIMES DAILY
Qty: 20 TABLET | Refills: 0 | Status: SHIPPED | OUTPATIENT
Start: 2018-07-02 | End: 2018-07-12

## 2018-07-02 RX ORDER — BENZONATATE 200 MG/1
200 CAPSULE ORAL 3 TIMES DAILY PRN
Qty: 21 CAPSULE | Refills: 0 | Status: SHIPPED | OUTPATIENT
Start: 2018-07-02 | End: 2018-07-09

## 2018-07-02 RX ORDER — PREDNISONE 20 MG/1
20 TABLET ORAL 2 TIMES DAILY
Qty: 10 TABLET | Refills: 0 | Status: SHIPPED | OUTPATIENT
Start: 2018-07-02 | End: 2018-07-07

## 2018-07-02 RX ORDER — IPRATROPIUM BROMIDE AND ALBUTEROL SULFATE 2.5; .5 MG/3ML; MG/3ML
1 SOLUTION RESPIRATORY (INHALATION) ONCE
Status: COMPLETED | OUTPATIENT
Start: 2018-07-02 | End: 2018-07-02

## 2018-07-02 RX ORDER — ALBUTEROL SULFATE 90 UG/1
2 AEROSOL, METERED RESPIRATORY (INHALATION) EVERY 4 HOURS PRN
Qty: 1 INHALER | Refills: 0 | Status: SHIPPED | OUTPATIENT
Start: 2018-07-02 | End: 2018-09-04 | Stop reason: ALTCHOICE

## 2018-07-02 RX ADMIN — IPRATROPIUM BROMIDE AND ALBUTEROL SULFATE 1 AMPULE: .5; 3 SOLUTION RESPIRATORY (INHALATION) at 19:38

## 2018-07-02 ASSESSMENT — ENCOUNTER SYMPTOMS
BACK PAIN: 0
SORE THROAT: 0
VOMITING: 0
CHEST TIGHTNESS: 0
RHINORRHEA: 1
SINUS PRESSURE: 0
SINUS PAIN: 0
ABDOMINAL PAIN: 0
SHORTNESS OF BREATH: 1
COUGH: 1
NAUSEA: 0
DIARRHEA: 0

## 2018-07-02 ASSESSMENT — PAIN DESCRIPTION - LOCATION: LOCATION: CHEST

## 2018-07-02 ASSESSMENT — PAIN SCALES - GENERAL: PAINLEVEL_OUTOF10: 7

## 2018-07-02 ASSESSMENT — PAIN DESCRIPTION - PAIN TYPE: TYPE: ACUTE PAIN

## 2018-07-02 ASSESSMENT — PAIN DESCRIPTION - DESCRIPTORS: DESCRIPTORS: SHARP

## 2018-07-02 ASSESSMENT — PAIN DESCRIPTION - FREQUENCY: FREQUENCY: INTERMITTENT

## 2018-07-02 NOTE — ED TRIAGE NOTES
Patient walked to room 3 for cough, shortness of breath onset a week ago. Patient states she has a yellow/brown productive cough. No other needs.

## 2018-07-02 NOTE — ED PROVIDER NOTES
congestion and rhinorrhea. Negative for ear discharge, ear pain, postnasal drip, sinus pain, sinus pressure and sore throat. Respiratory: Positive for cough and shortness of breath. Negative for chest tightness. Cardiovascular: Negative for chest pain. Gastrointestinal: Negative for abdominal pain, diarrhea, nausea and vomiting. Musculoskeletal: Negative for back pain, neck pain and neck stiffness. Skin: Negative for rash. Allergic/Immunologic: Negative for environmental allergies. Neurological: Negative for dizziness, light-headedness and headaches. Hematological: Negative for adenopathy. PAST MEDICAL HISTORY         Diagnosis Date    Depression     DM2 (diabetes mellitus, type 2) ([address]     Endometriosis     resolved after hysto    Esophageal stricture     Female bladder prolapse     had sling surgery    Former smoker     History of gastric bypass 2010    Lost 130 lbs.  History of hypertension     resolved after gastric bypass    Hypotension     Iron deficiency anemia     Obesity     Pulmonary nodule     Awaiting records, pt not sure when next CT due. Hasn't grown in years.  Spinal cord injury, C5-C7 ([facility] [address]     Vitamin D deficiency        SURGICAL HISTORY     Patient  has a past surgical history that includes Gastric bypass surgery (); Hysterectomy ();  section, low transverse; Cholecystectomy; Tonsillectomy; Foot surgery (3/3/15); bladder suspension ( 2 11); Upper gastrointestinal endoscopy ( 15); Esophagus dilation (5 20 15); Neck surgery (2015); other surgical history (2015); other surgical history (6/10/2016); other surgical history (Left, 2016); Leg Surgery (Left, 2017); other surgical history (10/16/2017); pr njx dx/ther sbst epidural/subarach lumbar/sacral (N/A, 10/16/2017); other surgical history (Bilateral, 2017);  Nerve Block Lumb Facet Level 1 Bilateral (Bilateral, 2017); other surgical history (Left, passive range of motion without pain. Neck supple. No spinous process tenderness and no muscular tenderness present. Cardiovascular: Normal rate, regular rhythm, S1 normal, S2 normal and normal heart sounds. Pulmonary/Chest: Effort normal. No accessory muscle usage. No respiratory distress. She has decreased breath sounds in the right middle field, the right lower field, the left middle field and the left lower field. She has no wheezes. She has no rhonchi. She has no rales. She exhibits no tenderness. Abdominal: Normal appearance. Lymphadenopathy:        Head (right side): No submental, no submandibular, no tonsillar, no preauricular, no posterior auricular and no occipital adenopathy present. Head (left side): No submental, no submandibular, no tonsillar, no preauricular, no posterior auricular and no occipital adenopathy present. She has no cervical adenopathy. Right: No supraclavicular adenopathy present. Left: No supraclavicular adenopathy present. Neurological: She is alert and oriented to person, place, and time. Skin: Skin is warm and dry. She is not diaphoretic. Nursing note and vitals reviewed. DIAGNOSTIC RESULTS   Labs:No results found for this visit on 07/02/18. IMAGING:    XR CHEST STANDARD (2 VW)   Final Result      No acute findings, stable radiographic appearance of the chest.         **This report has been created using voice recognition software. It may contain minor errors which are inherent in voice recognition technology. **      Final report electronically signed by Dr. Alberto Wei on 7/2/2018 7:59 PM            EKG:      URGENT CARE COURSE:     Vitals:    07/02/18 1921 07/02/18 1949   BP: (!) 157/81    Pulse: 73 74   Resp: 18 18   Temp: 98.1 °F (36.7 °C)    TempSrc: Oral    SpO2: 94% 96%       Medications   ipratropium-albuterol (DUONEB) nebulizer solution 1 ampule (1 ampule Inhalation Given 7/2/18 1938)     8:00 p.m.  Patient stated some relief

## 2018-07-21 DIAGNOSIS — M54.5 CHRONIC MIDLINE LOW BACK PAIN, WITH SCIATICA PRESENCE UNSPECIFIED: ICD-10-CM

## 2018-07-21 DIAGNOSIS — S70.02XS HEMATOMA OF LEFT HIP, SEQUELA: ICD-10-CM

## 2018-07-21 DIAGNOSIS — M79.605 PAIN IN BOTH LOWER EXTREMITIES: ICD-10-CM

## 2018-07-21 DIAGNOSIS — M79.604 PAIN IN BOTH LOWER EXTREMITIES: ICD-10-CM

## 2018-07-21 DIAGNOSIS — G89.29 CHRONIC MIDLINE LOW BACK PAIN, WITH SCIATICA PRESENCE UNSPECIFIED: ICD-10-CM

## 2018-07-21 DIAGNOSIS — G82.54 INCOMPLETE QUADRIPLEGIA AT C5-C8 LEVEL (HCC): ICD-10-CM

## 2018-07-21 DIAGNOSIS — M25.532 LEFT WRIST PAIN: ICD-10-CM

## 2018-07-21 DIAGNOSIS — T79.2XXD TRAUMATIC SEROMA OF LEFT THIGH, SUBSEQUENT ENCOUNTER: ICD-10-CM

## 2018-07-21 DIAGNOSIS — G62.9 NEUROPATHY: ICD-10-CM

## 2018-07-23 DIAGNOSIS — G89.29 OTHER CHRONIC PAIN: Primary | ICD-10-CM

## 2018-07-23 RX ORDER — AMITRIPTYLINE HYDROCHLORIDE 50 MG/1
TABLET, FILM COATED ORAL
Qty: 30 TABLET | Refills: 2 | Status: SHIPPED | OUTPATIENT
Start: 2018-07-23 | End: 2018-09-19 | Stop reason: SDUPTHER

## 2018-07-23 RX ORDER — MORPHINE SULFATE 15 MG/1
15 TABLET, FILM COATED, EXTENDED RELEASE ORAL EVERY 12 HOURS PRN
Qty: 60 TABLET | Refills: 0 | Status: SHIPPED | OUTPATIENT
Start: 2018-07-23 | End: 2018-08-22 | Stop reason: SDUPTHER

## 2018-07-23 RX ORDER — MIRTAZAPINE 15 MG/1
15 TABLET, FILM COATED ORAL NIGHTLY
Qty: 30 TABLET | Refills: 2 | Status: SHIPPED | OUTPATIENT
Start: 2018-07-23 | End: 2018-10-20 | Stop reason: SDUPTHER

## 2018-07-23 RX ORDER — HYDROCODONE BITARTRATE AND ACETAMINOPHEN 7.5; 325 MG/1; MG/1
1 TABLET ORAL EVERY 8 HOURS PRN
Qty: 90 TABLET | Refills: 0 | Status: SHIPPED | OUTPATIENT
Start: 2018-07-23 | End: 2018-08-22 | Stop reason: SDUPTHER

## 2018-08-02 ENCOUNTER — OFFICE VISIT (OUTPATIENT)
Dept: FAMILY MEDICINE CLINIC | Age: 53
End: 2018-08-02
Payer: MEDICARE

## 2018-08-02 VITALS
WEIGHT: 182 LBS | DIASTOLIC BLOOD PRESSURE: 70 MMHG | SYSTOLIC BLOOD PRESSURE: 118 MMHG | BODY MASS INDEX: 28.56 KG/M2 | HEIGHT: 67 IN

## 2018-08-02 DIAGNOSIS — N30.01 ACUTE CYSTITIS WITH HEMATURIA: Primary | ICD-10-CM

## 2018-08-02 DIAGNOSIS — R35.0 URINARY FREQUENCY: ICD-10-CM

## 2018-08-02 LAB
BACTERIA: ABNORMAL /HPF
BILIRUBIN URINE: NEGATIVE
BILIRUBIN, POC: NEGATIVE
BLOOD URINE, POC: NORMAL
BLOOD, URINE: ABNORMAL
CASTS 2: ABNORMAL /LPF
CASTS UA: ABNORMAL /LPF
CHARACTER, URINE: ABNORMAL
CLARITY, POC: NORMAL
COLOR, POC: YELLOW
COLOR: YELLOW
CRYSTALS, UA: ABNORMAL
EPITHELIAL CELLS, UA: ABNORMAL /HPF
GLUCOSE URINE, POC: NEGATIVE
GLUCOSE URINE: NEGATIVE MG/DL
KETONES, POC: NORMAL
KETONES, URINE: ABNORMAL
LEUKOCYTE EST, POC: NORMAL
LEUKOCYTE ESTERASE, URINE: ABNORMAL
MISCELLANEOUS 2: ABNORMAL
NITRITE, POC: POSITIVE
NITRITE, URINE: NEGATIVE
PH UA: 5.5
PH, POC: 5.5
PROTEIN UA: 100
PROTEIN, POC: >300
RBC URINE: ABNORMAL /HPF
RENAL EPITHELIAL, UA: ABNORMAL
SPECIFIC GRAVITY, POC: 1.02
SPECIFIC GRAVITY, URINE: 1.02 (ref 1–1.03)
UROBILINOGEN, POC: 1
UROBILINOGEN, URINE: 1 EU/DL
WBC UA: > 200 /HPF
YEAST: ABNORMAL

## 2018-08-02 PROCEDURE — 99213 OFFICE O/P EST LOW 20 MIN: CPT | Performed by: FAMILY MEDICINE

## 2018-08-02 PROCEDURE — 3017F COLORECTAL CA SCREEN DOC REV: CPT | Performed by: FAMILY MEDICINE

## 2018-08-02 PROCEDURE — G8427 DOCREV CUR MEDS BY ELIG CLIN: HCPCS | Performed by: FAMILY MEDICINE

## 2018-08-02 PROCEDURE — 81002 URINALYSIS NONAUTO W/O SCOPE: CPT | Performed by: FAMILY MEDICINE

## 2018-08-02 PROCEDURE — G8417 CALC BMI ABV UP PARAM F/U: HCPCS | Performed by: FAMILY MEDICINE

## 2018-08-02 PROCEDURE — 1036F TOBACCO NON-USER: CPT | Performed by: FAMILY MEDICINE

## 2018-08-02 RX ORDER — CEPHALEXIN 500 MG/1
500 CAPSULE ORAL 2 TIMES DAILY
Qty: 14 CAPSULE | Refills: 0 | Status: SHIPPED | OUTPATIENT
Start: 2018-08-02 | End: 2018-08-09

## 2018-08-02 ASSESSMENT — ENCOUNTER SYMPTOMS
NAUSEA: 0
CONSTIPATION: 0
DIARRHEA: 0
VOMITING: 0

## 2018-08-04 LAB
ORGANISM: ABNORMAL
URINE CULTURE REFLEX: ABNORMAL

## 2018-08-13 RX ORDER — GABAPENTIN 800 MG/1
TABLET ORAL
Qty: 90 TABLET | Refills: 0 | Status: SHIPPED | OUTPATIENT
Start: 2018-08-16 | End: 2018-09-11 | Stop reason: SDUPTHER

## 2018-08-20 RX ORDER — DULOXETIN HYDROCHLORIDE 60 MG/1
CAPSULE, DELAYED RELEASE ORAL
Qty: 30 CAPSULE | Refills: 1 | Status: SHIPPED | OUTPATIENT
Start: 2018-08-20 | End: 2018-10-20 | Stop reason: SDUPTHER

## 2018-08-22 DIAGNOSIS — G89.29 OTHER CHRONIC PAIN: ICD-10-CM

## 2018-08-22 RX ORDER — HYDROCODONE BITARTRATE AND ACETAMINOPHEN 7.5; 325 MG/1; MG/1
1 TABLET ORAL EVERY 8 HOURS PRN
Qty: 90 TABLET | Refills: 0 | Status: SHIPPED | OUTPATIENT
Start: 2018-08-22 | End: 2018-09-18 | Stop reason: SDUPTHER

## 2018-08-22 RX ORDER — MORPHINE SULFATE 15 MG/1
15 TABLET, FILM COATED, EXTENDED RELEASE ORAL EVERY 12 HOURS PRN
Qty: 60 TABLET | Refills: 0 | Status: SHIPPED | OUTPATIENT
Start: 2018-08-22 | End: 2018-09-18 | Stop reason: SDUPTHER

## 2018-09-04 ENCOUNTER — HOSPITAL ENCOUNTER (OUTPATIENT)
Dept: PREADMISSION TESTING | Age: 53
Discharge: HOME | End: 2018-09-04
Payer: MEDICARE

## 2018-09-04 NOTE — PROGRESS NOTES
In preparation for their surgical procedure above patient was screened for Obstructive Sleep Apnea (BRANDAN) using the STOP-Bang Questionnaire by the Pre-Admission Testing department. This is a pre-surgical screening tool for patient safety and serves as a recommendation, this WILL NOT cause cancellation of surgery. STOP-Bang Questionnaire  * Do you currently see a pulmonologist?  No     If yes STOP, do not complete. Patient follows with  .    1. Do you snore loudly (able to be heard in the next room)? No    2. Do you often feel tired or sleepy during the daytime? No       3. Has anyone ever told you that you stop breathing during your sleep? No    4. Do you have or are you being treated for high blood pressure? No      5. BMI more than 35? BMI (Calculated): 27.5        No    6. Age over 48 years? 46 y.o. Yes    7. Neck Circumference greater than 17 inches for male or 16 inches for female? Measured           (visits only)            Not Applicable    8. Gender Male? No      TOTAL SCORE: 1    BRANDAN - Low Risk : Yes to 0 - 2 questions  BRANDAN - Intermediate Risk : Yes to 3 - 4 questions  BRANDAN - High Risk : Yes to 5 - 8 questions    Adapted from:   STOP Questionnaire: A Tool to Screen Patients for Obstructive Sleep Apnea   ZULLY Nuñez.C.P.C., Steve Madsen M.B.B.S., Ten Rae M.D., Ambreen Kan. La Phelps, Ph.D., Darci Leventhal, M.B.B.S., USHA Villanueva.Sc., Jolene Barrientos M.D., Julia Shankar. PEREZ Gibbs.P.C.    Anesthesiology 2008; 955:100-96 Copyright 2008, the [address] [employer], [place]   ----------------------------------------------------------------------------------------------------------------

## 2018-09-08 ENCOUNTER — HOSPITAL ENCOUNTER (OUTPATIENT)
Age: 53
End: 2018-09-08
Payer: MEDICARE

## 2018-09-10 ENCOUNTER — HOSPITAL ENCOUNTER (OUTPATIENT)
Age: 53
Discharge: HOME | End: 2018-09-10
Payer: MEDICARE

## 2018-09-10 LAB
ANION GAP SERPL CALCULATED.3IONS-SCNC: 10 MEQ/L (ref 8–16)
APTT: 34.1 SECONDS (ref 22–38)
AVERAGE GLUCOSE: 141 MG/DL (ref 70–126)
BUN BLDV-MCNC: 11 MG/DL (ref 7–22)
CALCIUM SERPL-MCNC: 8.7 MG/DL (ref 8.5–10.5)
CHLORIDE BLD-SCNC: 104 MEQ/L (ref 98–111)
CO2: 30 MEQ/L (ref 23–33)
CREAT SERPL-MCNC: 0.6 MG/DL (ref 0.4–1.2)
EKG ATRIAL RATE: 55 BPM
EKG P AXIS: 76 DEGREES
EKG P-R INTERVAL: 126 MS
EKG Q-T INTERVAL: 428 MS
EKG QRS DURATION: 80 MS
EKG QTC CALCULATION (BAZETT): 409 MS
EKG R AXIS: 51 DEGREES
EKG T AXIS: 34 DEGREES
EKG VENTRICULAR RATE: 55 BPM
ERYTHROCYTE [DISTWIDTH] IN BLOOD BY AUTOMATED COUNT: 14 % (ref 11.5–14.5)
ERYTHROCYTE [DISTWIDTH] IN BLOOD BY AUTOMATED COUNT: 46.1 FL (ref 35–45)
GFR SERPL CREATININE-BSD FRML MDRD: > 90 ML/MIN/1.73M2
GLUCOSE BLD-MCNC: 83 MG/DL (ref 70–108)
HBA1C MFR BLD: 6.7 % (ref 4.4–6.4)
HCT VFR BLD CALC: 40 % (ref 37–47)
HEMOGLOBIN: 12.6 GM/DL (ref 12–16)
INR BLD: 0.87 (ref 0.85–1.13)
MCH RBC QN AUTO: 28.4 PG (ref 26–33)
MCHC RBC AUTO-ENTMCNC: 31.5 GM/DL (ref 32.2–35.5)
MCV RBC AUTO: 90.1 FL (ref 81–99)
MRSA NASAL SCREEN RT-PCR: POSITIVE
PLATELET # BLD: 270 THOU/MM3 (ref 130–400)
PMV BLD AUTO: 9.9 FL (ref 9.4–12.4)
POTASSIUM SERPL-SCNC: 4.2 MEQ/L (ref 3.5–5.2)
RBC # BLD: 4.44 MILL/MM3 (ref 4.2–5.4)
SODIUM BLD-SCNC: 144 MEQ/L (ref 135–145)
STAPH AUREUS SCREEN RT-PCR: POSITIVE
WBC # BLD: 6.5 THOU/MM3 (ref 4.8–10.8)

## 2018-09-10 PROCEDURE — 85027 COMPLETE CBC AUTOMATED: CPT

## 2018-09-10 PROCEDURE — 36415 COLL VENOUS BLD VENIPUNCTURE: CPT

## 2018-09-10 PROCEDURE — 87081 CULTURE SCREEN ONLY: CPT

## 2018-09-10 PROCEDURE — 93010 ELECTROCARDIOGRAM REPORT: CPT | Performed by: INTERNAL MEDICINE

## 2018-09-10 PROCEDURE — 87640 STAPH A DNA AMP PROBE: CPT

## 2018-09-10 PROCEDURE — 85610 PROTHROMBIN TIME: CPT

## 2018-09-10 PROCEDURE — 83036 HEMOGLOBIN GLYCOSYLATED A1C: CPT

## 2018-09-10 PROCEDURE — 87641 MR-STAPH DNA AMP PROBE: CPT

## 2018-09-10 PROCEDURE — 80048 BASIC METABOLIC PNL TOTAL CA: CPT

## 2018-09-10 PROCEDURE — 93005 ELECTROCARDIOGRAM TRACING: CPT | Performed by: ORTHOPAEDIC SURGERY

## 2018-09-10 PROCEDURE — 85730 THROMBOPLASTIN TIME PARTIAL: CPT

## 2018-09-11 RX ORDER — GABAPENTIN 800 MG/1
TABLET ORAL
Qty: 90 TABLET | Refills: 0 | Status: SHIPPED | OUTPATIENT
Start: 2018-09-17 | End: 2018-09-19 | Stop reason: SDUPTHER

## 2018-09-12 LAB — MRSA SCREEN: NORMAL

## 2018-09-14 ENCOUNTER — OFFICE VISIT (OUTPATIENT)
Dept: FAMILY MEDICINE CLINIC | Age: 53
End: 2018-09-14
Payer: MEDICARE

## 2018-09-14 VITALS
HEART RATE: 66 BPM | WEIGHT: 192 LBS | SYSTOLIC BLOOD PRESSURE: 100 MMHG | HEIGHT: 67 IN | BODY MASS INDEX: 30.13 KG/M2 | DIASTOLIC BLOOD PRESSURE: 60 MMHG

## 2018-09-14 DIAGNOSIS — M54.2 CHRONIC NECK PAIN: ICD-10-CM

## 2018-09-14 DIAGNOSIS — Z01.818 PREOPERATIVE CLEARANCE: Primary | ICD-10-CM

## 2018-09-14 DIAGNOSIS — Z12.11 SCREENING FOR COLON CANCER: ICD-10-CM

## 2018-09-14 DIAGNOSIS — G89.29 CHRONIC NECK PAIN: ICD-10-CM

## 2018-09-14 DIAGNOSIS — E11.9 TYPE 2 DIABETES MELLITUS WITHOUT COMPLICATION, WITHOUT LONG-TERM CURRENT USE OF INSULIN (HCC): Chronic | ICD-10-CM

## 2018-09-14 PROCEDURE — 2022F DILAT RTA XM EVC RTNOPTHY: CPT | Performed by: FAMILY MEDICINE

## 2018-09-14 PROCEDURE — 99213 OFFICE O/P EST LOW 20 MIN: CPT | Performed by: FAMILY MEDICINE

## 2018-09-14 PROCEDURE — 3017F COLORECTAL CA SCREEN DOC REV: CPT | Performed by: FAMILY MEDICINE

## 2018-09-14 PROCEDURE — 82044 UR ALBUMIN SEMIQUANTITATIVE: CPT | Performed by: FAMILY MEDICINE

## 2018-09-14 PROCEDURE — G8427 DOCREV CUR MEDS BY ELIG CLIN: HCPCS | Performed by: FAMILY MEDICINE

## 2018-09-14 PROCEDURE — G8417 CALC BMI ABV UP PARAM F/U: HCPCS | Performed by: FAMILY MEDICINE

## 2018-09-14 ASSESSMENT — ENCOUNTER SYMPTOMS
BACK PAIN: 1
SHORTNESS OF BREATH: 0
EYE REDNESS: 0
CHEST TIGHTNESS: 0
NAUSEA: 0
COLOR CHANGE: 0
VOMITING: 0
EYE DISCHARGE: 0

## 2018-09-14 NOTE — PROGRESS NOTES
[address] [place] [address] [address], [address] [place]  Phone:  [phone]  Fax:  [phone]    PREOPERATIVE CLEARANCE     Name: Blas Hernandez  : 1965         Chief Complaint:     Chief Complaint   Patient presents with    Pre-op Exam     [employer]  back surgery with stimulator implant 2018       History of Present Illness: The patient is presenting today for preoperative clearance for placement of a T8 spinal cord stimulator with Dr. Randy Goins on 18. Functional capacity:  > 4 mets (can vacuum/do housework, climb a flight of stairs without dyspnea)? Yes    Stress test or cardiac cath within last 5 years? 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 Past Medical History:     Past Medical History:   Diagnosis Date    Depression     DM2 (diabetes mellitus, type 2) ([address]     Endometriosis     resolved after hysto    Esophageal stricture     Female bladder prolapse     had sling surgery    Former smoker     History of gastric bypass 2010    Lost 130 lbs.  History of hypertension     resolved after gastric bypass    Hypotension     Iron deficiency anemia     Obesity     Pulmonary nodule     Awaiting records, pt not sure when next CT due. Hasn't grown in years.     Spinal cord injury, C5-C7 ([address]     Vitamin D deficiency     Wears glasses         Past Surgical History:     Past Surgical History:   Procedure Laterality Date    BLADDER SUSPENSION  8 2 11     SECTION, LOW TRANSVERSE      x3    CHOLECYSTECTOMY

## 2018-09-18 DIAGNOSIS — G89.29 OTHER CHRONIC PAIN: ICD-10-CM

## 2018-09-19 DIAGNOSIS — T79.2XXD TRAUMATIC SEROMA OF LEFT THIGH, SUBSEQUENT ENCOUNTER: ICD-10-CM

## 2018-09-19 DIAGNOSIS — M54.5 CHRONIC MIDLINE LOW BACK PAIN, WITH SCIATICA PRESENCE UNSPECIFIED: ICD-10-CM

## 2018-09-19 DIAGNOSIS — G82.54 INCOMPLETE QUADRIPLEGIA AT C5-C8 LEVEL (HCC): ICD-10-CM

## 2018-09-19 DIAGNOSIS — G89.29 CHRONIC MIDLINE LOW BACK PAIN, WITH SCIATICA PRESENCE UNSPECIFIED: ICD-10-CM

## 2018-09-19 DIAGNOSIS — S70.02XS HEMATOMA OF LEFT HIP, SEQUELA: ICD-10-CM

## 2018-09-19 DIAGNOSIS — M79.605 PAIN IN BOTH LOWER EXTREMITIES: ICD-10-CM

## 2018-09-19 DIAGNOSIS — M25.532 LEFT WRIST PAIN: ICD-10-CM

## 2018-09-19 DIAGNOSIS — G62.9 NEUROPATHY: ICD-10-CM

## 2018-09-19 DIAGNOSIS — M79.604 PAIN IN BOTH LOWER EXTREMITIES: ICD-10-CM

## 2018-09-19 RX ORDER — GABAPENTIN 800 MG/1
TABLET ORAL
Qty: 90 TABLET | Refills: 0 | Status: SHIPPED | OUTPATIENT
Start: 2018-09-19 | End: 2018-10-17 | Stop reason: SDUPTHER

## 2018-09-19 RX ORDER — HYDROCODONE BITARTRATE AND ACETAMINOPHEN 7.5; 325 MG/1; MG/1
1 TABLET ORAL EVERY 8 HOURS PRN
Qty: 90 TABLET | Refills: 0 | Status: SHIPPED | OUTPATIENT
Start: 2018-09-20 | End: 2018-10-17 | Stop reason: SDUPTHER

## 2018-09-19 RX ORDER — MORPHINE SULFATE 15 MG/1
15 TABLET, FILM COATED, EXTENDED RELEASE ORAL EVERY 12 HOURS PRN
Qty: 60 TABLET | Refills: 0 | Status: ON HOLD | OUTPATIENT
Start: 2018-09-20 | End: 2018-09-29 | Stop reason: HOSPADM

## 2018-09-20 RX ORDER — AMITRIPTYLINE HYDROCHLORIDE 50 MG/1
TABLET, FILM COATED ORAL
Qty: 30 TABLET | Refills: 2 | Status: SHIPPED | OUTPATIENT
Start: 2018-09-20 | End: 2018-12-19 | Stop reason: SDUPTHER
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
## 2018-09-28 ENCOUNTER — APPOINTMENT (OUTPATIENT)
Dept: GENERAL RADIOLOGY | Age: 53
End: 2018-09-28
Attending: ORTHOPAEDIC SURGERY
Payer: MEDICARE

## 2018-09-28 ENCOUNTER — ANESTHESIA EVENT (OUTPATIENT)
Dept: OPERATING ROOM | Age: 53
End: 2018-09-28
Payer: MEDICARE

## 2018-09-28 ENCOUNTER — ANESTHESIA (OUTPATIENT)
Dept: OPERATING ROOM | Age: 53
End: 2018-09-28
Payer: MEDICARE

## 2018-09-28 ENCOUNTER — HOSPITAL ENCOUNTER (OUTPATIENT)
Age: 53
Discharge: HOME | End: 2018-09-29
Attending: ORTHOPAEDIC SURGERY | Admitting: ORTHOPAEDIC SURGERY
Payer: MEDICARE

## 2018-09-28 VITALS
SYSTOLIC BLOOD PRESSURE: 155 MMHG | RESPIRATION RATE: 2 BRPM | TEMPERATURE: 94.3 F | OXYGEN SATURATION: 100 % | DIASTOLIC BLOOD PRESSURE: 102 MMHG

## 2018-09-28 LAB
GLUCOSE BLD-MCNC: 147 MG/DL (ref 70–108)
GLUCOSE BLD-MCNC: 85 MG/DL (ref 70–108)

## 2018-09-28 PROCEDURE — 2580000003 HC RX 258: Performed by: PHYSICIAN ASSISTANT

## 2018-09-28 PROCEDURE — 6360000002 HC RX W HCPCS: Performed by: PHYSICIAN ASSISTANT

## 2018-09-28 PROCEDURE — 2500000003 HC RX 250 WO HCPCS: Performed by: ANESTHESIOLOGY

## 2018-09-28 PROCEDURE — C1778 LEAD, NEUROSTIMULATOR: HCPCS | Performed by: ORTHOPAEDIC SURGERY

## 2018-09-28 PROCEDURE — 6370000000 HC RX 637 (ALT 250 FOR IP): Performed by: PHYSICIAN ASSISTANT

## 2018-09-28 PROCEDURE — 96361 HYDRATE IV INFUSION ADD-ON: CPT

## 2018-09-28 PROCEDURE — 2500000003 HC RX 250 WO HCPCS: Performed by: ORTHOPAEDIC SURGERY

## 2018-09-28 PROCEDURE — 72020 X-RAY EXAM OF SPINE 1 VIEW: CPT

## 2018-09-28 PROCEDURE — C1713 ANCHOR/SCREW BN/BN,TIS/BN: HCPCS | Performed by: ORTHOPAEDIC SURGERY

## 2018-09-28 PROCEDURE — 96374 THER/PROPH/DIAG INJ IV PUSH: CPT

## 2018-09-28 PROCEDURE — 2700000000 HC OXYGEN THERAPY PER DAY

## 2018-09-28 PROCEDURE — 6360000002 HC RX W HCPCS: Performed by: ANESTHESIOLOGY

## 2018-09-28 PROCEDURE — 2580000003 HC RX 258: Performed by: ORTHOPAEDIC SURGERY

## 2018-09-28 PROCEDURE — 96367 TX/PROPH/DG ADDL SEQ IV INF: CPT

## 2018-09-28 PROCEDURE — 3700000000 HC ANESTHESIA ATTENDED CARE: Performed by: ORTHOPAEDIC SURGERY

## 2018-09-28 PROCEDURE — 2780000010 HC IMPLANT OTHER: Performed by: ORTHOPAEDIC SURGERY

## 2018-09-28 PROCEDURE — 82948 REAGENT STRIP/BLOOD GLUCOSE: CPT

## 2018-09-28 PROCEDURE — 7100000001 HC PACU RECOVERY - ADDTL 15 MIN: Performed by: ORTHOPAEDIC SURGERY

## 2018-09-28 PROCEDURE — 7100000000 HC PACU RECOVERY - FIRST 15 MIN: Performed by: ORTHOPAEDIC SURGERY

## 2018-09-28 PROCEDURE — 3600000014 HC SURGERY LEVEL 4 ADDTL 15MIN: Performed by: ORTHOPAEDIC SURGERY

## 2018-09-28 PROCEDURE — 3600000004 HC SURGERY LEVEL 4 BASE: Performed by: ORTHOPAEDIC SURGERY

## 2018-09-28 PROCEDURE — 6360000002 HC RX W HCPCS: Performed by: ORTHOPAEDIC SURGERY

## 2018-09-28 PROCEDURE — C1787 PATIENT PROGR, NEUROSTIM: HCPCS | Performed by: ORTHOPAEDIC SURGERY

## 2018-09-28 PROCEDURE — C1820 GENERATOR NEURO RECHG BAT SY: HCPCS | Performed by: ORTHOPAEDIC SURGERY

## 2018-09-28 PROCEDURE — 95940 IONM IN OPERATNG ROOM 15 MIN: CPT | Performed by: ORTHOPAEDIC SURGERY

## 2018-09-28 PROCEDURE — 96366 THER/PROPH/DIAG IV INF ADDON: CPT

## 2018-09-28 PROCEDURE — 2720000010 HC SURG SUPPLY STERILE: Performed by: ORTHOPAEDIC SURGERY

## 2018-09-28 PROCEDURE — 3209999900 FLUORO FOR SURGICAL PROCEDURES

## 2018-09-28 PROCEDURE — 2709999900 HC NON-CHARGEABLE SUPPLY: Performed by: ORTHOPAEDIC SURGERY

## 2018-09-28 PROCEDURE — 96376 TX/PRO/DX INJ SAME DRUG ADON: CPT

## 2018-09-28 PROCEDURE — 3700000001 HC ADD 15 MINUTES (ANESTHESIA): Performed by: ORTHOPAEDIC SURGERY

## 2018-09-28 PROCEDURE — 96360 HYDRATION IV INFUSION INIT: CPT

## 2018-09-28 DEVICE — Z DUP USE 2138772 BAND ANCHR FIX TISS ANULEX 201: Type: IMPLANTABLE DEVICE | Status: FUNCTIONAL

## 2018-09-28 DEVICE — IMPLANTABLE PULSE GENERATOR KIT
Type: IMPLANTABLE DEVICE | Status: FUNCTIONAL
Brand: SPECTRA WAVEWRITER™

## 2018-09-28 DEVICE — ANCHOR
Type: IMPLANTABLE DEVICE | Status: FUNCTIONAL
Brand: CLICK™

## 2018-09-28 DEVICE — 50CM 4X8 SURGICAL LEAD KIT
Type: IMPLANTABLE DEVICE | Status: FUNCTIONAL
Brand: COVEREDGE™ 32

## 2018-09-28 RX ORDER — MEPERIDINE HYDROCHLORIDE 25 MG/ML
12.5 INJECTION INTRAMUSCULAR; INTRAVENOUS; SUBCUTANEOUS EVERY 5 MIN PRN
Status: DISCONTINUED | OUTPATIENT
Start: 2018-09-28 | End: 2018-09-28 | Stop reason: HOSPADM

## 2018-09-28 RX ORDER — LABETALOL HYDROCHLORIDE 5 MG/ML
5 INJECTION, SOLUTION INTRAVENOUS EVERY 10 MIN PRN
Status: DISCONTINUED | OUTPATIENT
Start: 2018-09-28 | End: 2018-09-28 | Stop reason: HOSPADM

## 2018-09-28 RX ORDER — MORPHINE SULFATE 2 MG/ML
2 INJECTION, SOLUTION INTRAMUSCULAR; INTRAVENOUS
Status: DISCONTINUED | OUTPATIENT
Start: 2018-09-28 | End: 2018-09-29 | Stop reason: HOSPADM

## 2018-09-28 RX ORDER — MORPHINE SULFATE 4 MG/ML
4 INJECTION, SOLUTION INTRAMUSCULAR; INTRAVENOUS
Status: DISCONTINUED | OUTPATIENT
Start: 2018-09-28 | End: 2018-09-29 | Stop reason: HOSPADM

## 2018-09-28 RX ORDER — OXYCODONE HYDROCHLORIDE AND ACETAMINOPHEN 5; 325 MG/1; MG/1
1 TABLET ORAL EVERY 4 HOURS PRN
Status: DISCONTINUED | OUTPATIENT
Start: 2018-09-28 | End: 2018-09-29 | Stop reason: HOSPADM

## 2018-09-28 RX ORDER — AMITRIPTYLINE HYDROCHLORIDE 25 MG/1
50 TABLET, FILM COATED ORAL NIGHTLY
Status: DISCONTINUED | OUTPATIENT
Start: 2018-09-28 | End: 2018-09-29 | Stop reason: HOSPADM

## 2018-09-28 RX ORDER — DOCUSATE SODIUM 100 MG/1
100 CAPSULE, LIQUID FILLED ORAL 2 TIMES DAILY
Status: DISCONTINUED | OUTPATIENT
Start: 2018-09-28 | End: 2018-09-29 | Stop reason: HOSPADM

## 2018-09-28 RX ORDER — DIPHENHYDRAMINE HYDROCHLORIDE 50 MG/ML
12.5 INJECTION INTRAMUSCULAR; INTRAVENOUS
Status: DISCONTINUED | OUTPATIENT
Start: 2018-09-28 | End: 2018-09-28 | Stop reason: HOSPADM

## 2018-09-28 RX ORDER — NEOSTIGMINE METHYLSULFATE 1 MG/ML
INJECTION, SOLUTION INTRAVENOUS PRN
Status: DISCONTINUED | OUTPATIENT
Start: 2018-09-28 | End: 2018-09-28 | Stop reason: SDUPTHER

## 2018-09-28 RX ORDER — PROMETHAZINE HYDROCHLORIDE 25 MG/ML
12.5 INJECTION, SOLUTION INTRAMUSCULAR; INTRAVENOUS
Status: DISCONTINUED | OUTPATIENT
Start: 2018-09-28 | End: 2018-09-28 | Stop reason: HOSPADM

## 2018-09-28 RX ORDER — SODIUM CHLORIDE 9 MG/ML
INJECTION, SOLUTION INTRAVENOUS CONTINUOUS
Status: DISCONTINUED | OUTPATIENT
Start: 2018-09-28 | End: 2018-09-29 | Stop reason: HOSPADM

## 2018-09-28 RX ORDER — DULOXETIN HYDROCHLORIDE 60 MG/1
60 CAPSULE, DELAYED RELEASE ORAL DAILY
Status: DISCONTINUED | OUTPATIENT
Start: 2018-09-28 | End: 2018-09-29 | Stop reason: HOSPADM

## 2018-09-28 RX ORDER — HYDROMORPHONE HCL 110MG/55ML
PATIENT CONTROLLED ANALGESIA SYRINGE INTRAVENOUS PRN
Status: DISCONTINUED | OUTPATIENT
Start: 2018-09-28 | End: 2018-09-28 | Stop reason: SDUPTHER

## 2018-09-28 RX ORDER — SODIUM CHLORIDE 0.9 % (FLUSH) 0.9 %
10 SYRINGE (ML) INJECTION PRN
Status: DISCONTINUED | OUTPATIENT
Start: 2018-09-28 | End: 2018-09-29 | Stop reason: HOSPADM

## 2018-09-28 RX ORDER — ONDANSETRON 2 MG/ML
4 INJECTION INTRAMUSCULAR; INTRAVENOUS EVERY 6 HOURS PRN
Status: DISCONTINUED | OUTPATIENT
Start: 2018-09-28 | End: 2018-09-29 | Stop reason: HOSPADM

## 2018-09-28 RX ORDER — MIRTAZAPINE 15 MG/1
15 TABLET, FILM COATED ORAL NIGHTLY
Status: DISCONTINUED | OUTPATIENT
Start: 2018-09-28 | End: 2018-09-29 | Stop reason: HOSPADM

## 2018-09-28 RX ORDER — SODIUM CHLORIDE 9 MG/ML
INJECTION, SOLUTION INTRAVENOUS CONTINUOUS
Status: DISCONTINUED | OUTPATIENT
Start: 2018-09-28 | End: 2018-09-28

## 2018-09-28 RX ORDER — FENTANYL CITRATE 50 UG/ML
50 INJECTION, SOLUTION INTRAMUSCULAR; INTRAVENOUS EVERY 5 MIN PRN
Status: DISCONTINUED | OUTPATIENT
Start: 2018-09-28 | End: 2018-09-28 | Stop reason: HOSPADM

## 2018-09-28 RX ORDER — GABAPENTIN 400 MG/1
800 CAPSULE ORAL 3 TIMES DAILY
Status: DISCONTINUED | OUTPATIENT
Start: 2018-09-28 | End: 2018-09-29 | Stop reason: HOSPADM

## 2018-09-28 RX ORDER — OXYCODONE HYDROCHLORIDE AND ACETAMINOPHEN 5; 325 MG/1; MG/1
2 TABLET ORAL EVERY 4 HOURS PRN
Status: DISCONTINUED | OUTPATIENT
Start: 2018-09-28 | End: 2018-09-29 | Stop reason: HOSPADM

## 2018-09-28 RX ORDER — BISACODYL 10 MG
10 SUPPOSITORY, RECTAL RECTAL DAILY PRN
Status: DISCONTINUED | OUTPATIENT
Start: 2018-09-28 | End: 2018-09-29 | Stop reason: HOSPADM

## 2018-09-28 RX ORDER — MIDAZOLAM HYDROCHLORIDE 1 MG/ML
INJECTION INTRAMUSCULAR; INTRAVENOUS PRN
Status: DISCONTINUED | OUTPATIENT
Start: 2018-09-28 | End: 2018-09-28 | Stop reason: SDUPTHER

## 2018-09-28 RX ORDER — ACETAMINOPHEN 325 MG/1
650 TABLET ORAL EVERY 4 HOURS PRN
Status: DISCONTINUED | OUTPATIENT
Start: 2018-09-28 | End: 2018-09-29 | Stop reason: HOSPADM

## 2018-09-28 RX ORDER — SODIUM CHLORIDE 0.9 % (FLUSH) 0.9 %
10 SYRINGE (ML) INJECTION PRN
Status: DISCONTINUED | OUTPATIENT
Start: 2018-09-28 | End: 2018-09-28 | Stop reason: HOSPADM

## 2018-09-28 RX ORDER — FAMOTIDINE 20 MG/1
20 TABLET, FILM COATED ORAL 2 TIMES DAILY
Status: DISCONTINUED | OUTPATIENT
Start: 2018-09-28 | End: 2018-09-29 | Stop reason: HOSPADM

## 2018-09-28 RX ORDER — FENTANYL CITRATE 50 UG/ML
25 INJECTION, SOLUTION INTRAMUSCULAR; INTRAVENOUS EVERY 5 MIN PRN
Status: DISCONTINUED | OUTPATIENT
Start: 2018-09-28 | End: 2018-09-28 | Stop reason: HOSPADM

## 2018-09-28 RX ORDER — ACETAMINOPHEN 650 MG/1
650 SUPPOSITORY RECTAL EVERY 4 HOURS PRN
Status: DISCONTINUED | OUTPATIENT
Start: 2018-09-28 | End: 2018-09-29 | Stop reason: HOSPADM

## 2018-09-28 RX ORDER — GLYCOPYRROLATE 1 MG/5 ML
SYRINGE (ML) INTRAVENOUS PRN
Status: DISCONTINUED | OUTPATIENT
Start: 2018-09-28 | End: 2018-09-28 | Stop reason: SDUPTHER

## 2018-09-28 RX ORDER — SODIUM CHLORIDE 0.9 % (FLUSH) 0.9 %
10 SYRINGE (ML) INJECTION EVERY 12 HOURS SCHEDULED
Status: DISCONTINUED | OUTPATIENT
Start: 2018-09-28 | End: 2018-09-28 | Stop reason: HOSPADM

## 2018-09-28 RX ORDER — FENTANYL CITRATE 50 UG/ML
INJECTION, SOLUTION INTRAMUSCULAR; INTRAVENOUS PRN
Status: DISCONTINUED | OUTPATIENT
Start: 2018-09-28 | End: 2018-09-28 | Stop reason: SDUPTHER

## 2018-09-28 RX ORDER — SODIUM CHLORIDE 0.9 % (FLUSH) 0.9 %
10 SYRINGE (ML) INJECTION EVERY 12 HOURS SCHEDULED
Status: DISCONTINUED | OUTPATIENT
Start: 2018-09-28 | End: 2018-09-29 | Stop reason: HOSPADM

## 2018-09-28 RX ORDER — METOCLOPRAMIDE HYDROCHLORIDE 5 MG/ML
10 INJECTION INTRAMUSCULAR; INTRAVENOUS
Status: DISCONTINUED | OUTPATIENT
Start: 2018-09-28 | End: 2018-09-28 | Stop reason: HOSPADM

## 2018-09-28 RX ORDER — ROCURONIUM BROMIDE 10 MG/ML
INJECTION, SOLUTION INTRAVENOUS PRN
Status: DISCONTINUED | OUTPATIENT
Start: 2018-09-28 | End: 2018-09-28 | Stop reason: SDUPTHER

## 2018-09-28 RX ORDER — CYCLOBENZAPRINE HCL 10 MG
10 TABLET ORAL 3 TIMES DAILY PRN
Status: DISCONTINUED | OUTPATIENT
Start: 2018-09-28 | End: 2018-09-29 | Stop reason: HOSPADM

## 2018-09-28 RX ORDER — PROPOFOL 10 MG/ML
INJECTION, EMULSION INTRAVENOUS PRN
Status: DISCONTINUED | OUTPATIENT
Start: 2018-09-28 | End: 2018-09-28 | Stop reason: SDUPTHER

## 2018-09-28 RX ORDER — LIDOCAINE HYDROCHLORIDE 20 MG/ML
INJECTION, SOLUTION INFILTRATION; PERINEURAL PRN
Status: DISCONTINUED | OUTPATIENT
Start: 2018-09-28 | End: 2018-09-28 | Stop reason: SDUPTHER

## 2018-09-28 RX ADMIN — NEOSTIGMINE METHYLSULFATE 3 MG: 1 INJECTION, SOLUTION INTRAVENOUS at 08:41

## 2018-09-28 RX ADMIN — DULOXETINE HYDROCHLORIDE 60 MG: 60 CAPSULE, DELAYED RELEASE ORAL at 13:32

## 2018-09-28 RX ADMIN — FENTANYL CITRATE 50 MCG: 50 INJECTION INTRAMUSCULAR; INTRAVENOUS at 07:57

## 2018-09-28 RX ADMIN — MORPHINE SULFATE 4 MG: 4 INJECTION INTRAVENOUS at 11:52

## 2018-09-28 RX ADMIN — FAMOTIDINE 20 MG: 20 TABLET, FILM COATED ORAL at 20:37

## 2018-09-28 RX ADMIN — PROPOFOL 50 MG: 10 INJECTION, EMULSION INTRAVENOUS at 07:55

## 2018-09-28 RX ADMIN — VANCOMYCIN HYDROCHLORIDE 1500 MG: 1 INJECTION, POWDER, LYOPHILIZED, FOR SOLUTION INTRAVENOUS at 20:38

## 2018-09-28 RX ADMIN — FAMOTIDINE 20 MG: 20 TABLET, FILM COATED ORAL at 13:31

## 2018-09-28 RX ADMIN — MIRTAZAPINE 15 MG: 15 TABLET, FILM COATED ORAL at 20:37

## 2018-09-28 RX ADMIN — SODIUM CHLORIDE: 9 INJECTION, SOLUTION INTRAVENOUS at 15:07

## 2018-09-28 RX ADMIN — LIDOCAINE HYDROCHLORIDE 100 MG: 20 INJECTION, SOLUTION INFILTRATION; PERINEURAL at 07:28

## 2018-09-28 RX ADMIN — AMITRIPTYLINE HYDROCHLORIDE 50 MG: 25 TABLET, FILM COATED ORAL at 20:38

## 2018-09-28 RX ADMIN — Medication 0.6 MG: at 08:41

## 2018-09-28 RX ADMIN — CYCLOBENZAPRINE HYDROCHLORIDE 10 MG: 10 TABLET, FILM COATED ORAL at 20:44

## 2018-09-28 RX ADMIN — FENTANYL CITRATE 50 MCG: 50 INJECTION INTRAMUSCULAR; INTRAVENOUS at 09:30

## 2018-09-28 RX ADMIN — MORPHINE SULFATE 2 MG: 2 INJECTION, SOLUTION INTRAMUSCULAR; INTRAVENOUS at 20:37

## 2018-09-28 RX ADMIN — Medication 0.4 MG: at 07:28

## 2018-09-28 RX ADMIN — GABAPENTIN 800 MG: 400 CAPSULE ORAL at 20:37

## 2018-09-28 RX ADMIN — MORPHINE SULFATE 4 MG: 4 INJECTION INTRAVENOUS at 17:22

## 2018-09-28 RX ADMIN — FENTANYL CITRATE 50 MCG: 50 INJECTION INTRAMUSCULAR; INTRAVENOUS at 09:25

## 2018-09-28 RX ADMIN — PROPOFOL 150 MG: 10 INJECTION, EMULSION INTRAVENOUS at 07:28

## 2018-09-28 RX ADMIN — SODIUM CHLORIDE: 9 INJECTION, SOLUTION INTRAVENOUS at 06:55

## 2018-09-28 RX ADMIN — MIDAZOLAM HYDROCHLORIDE 2 MG: 1 INJECTION, SOLUTION INTRAMUSCULAR; INTRAVENOUS at 07:24

## 2018-09-28 RX ADMIN — FENTANYL CITRATE 100 MCG: 50 INJECTION INTRAMUSCULAR; INTRAVENOUS at 07:50

## 2018-09-28 RX ADMIN — VANCOMYCIN HYDROCHLORIDE 1500 MG: 1 INJECTION, POWDER, LYOPHILIZED, FOR SOLUTION INTRAVENOUS at 07:44

## 2018-09-28 RX ADMIN — HYDROMORPHONE HYDROCHLORIDE 1 MG: 2 INJECTION INTRAMUSCULAR; INTRAVENOUS; SUBCUTANEOUS at 09:00

## 2018-09-28 RX ADMIN — DOCUSATE SODIUM 100 MG: 100 CAPSULE, LIQUID FILLED ORAL at 13:31

## 2018-09-28 RX ADMIN — OXYCODONE HYDROCHLORIDE AND ACETAMINOPHEN 2 TABLET: 5; 325 TABLET ORAL at 19:20

## 2018-09-28 RX ADMIN — OXYCODONE HYDROCHLORIDE AND ACETAMINOPHEN 2 TABLET: 5; 325 TABLET ORAL at 13:23

## 2018-09-28 RX ADMIN — DOCUSATE SODIUM 100 MG: 100 CAPSULE, LIQUID FILLED ORAL at 20:37

## 2018-09-28 RX ADMIN — HYDROMORPHONE HYDROCHLORIDE 1 MG: 2 INJECTION INTRAMUSCULAR; INTRAVENOUS; SUBCUTANEOUS at 08:56

## 2018-09-28 RX ADMIN — MORPHINE SULFATE 2 MG: 2 INJECTION, SOLUTION INTRAMUSCULAR; INTRAVENOUS at 22:53

## 2018-09-28 RX ADMIN — FENTANYL CITRATE 100 MCG: 50 INJECTION INTRAMUSCULAR; INTRAVENOUS at 07:43

## 2018-09-28 RX ADMIN — ROCURONIUM BROMIDE 30 MG: 10 INJECTION INTRAVENOUS at 07:28

## 2018-09-28 RX ADMIN — GABAPENTIN 800 MG: 400 CAPSULE ORAL at 13:32

## 2018-09-28 ASSESSMENT — PAIN DESCRIPTION - DESCRIPTORS
DESCRIPTORS: ACHING

## 2018-09-28 ASSESSMENT — PULMONARY FUNCTION TESTS
PIF_VALUE: 19
PIF_VALUE: 17
PIF_VALUE: 22
PIF_VALUE: 17
PIF_VALUE: 19
PIF_VALUE: 18
PIF_VALUE: 18
PIF_VALUE: 1
PIF_VALUE: 18
PIF_VALUE: 22
PIF_VALUE: 19
PIF_VALUE: 22
PIF_VALUE: 22
PIF_VALUE: 18
PIF_VALUE: 22
PIF_VALUE: 18
PIF_VALUE: 18
PIF_VALUE: 0
PIF_VALUE: 6
PIF_VALUE: 19
PIF_VALUE: 25
PIF_VALUE: 13
PIF_VALUE: 1
PIF_VALUE: 19
PIF_VALUE: 19
PIF_VALUE: 17
PIF_VALUE: 22
PIF_VALUE: 19
PIF_VALUE: 18
PIF_VALUE: 19
PIF_VALUE: 19
PIF_VALUE: 0
PIF_VALUE: 19
PIF_VALUE: 22
PIF_VALUE: 18
PIF_VALUE: 22
PIF_VALUE: 12
PIF_VALUE: 19
PIF_VALUE: 19
PIF_VALUE: 13
PIF_VALUE: 18
PIF_VALUE: 15
PIF_VALUE: 19
PIF_VALUE: 22
PIF_VALUE: 17
PIF_VALUE: 19
PIF_VALUE: 22
PIF_VALUE: 15
PIF_VALUE: 30
PIF_VALUE: 19
PIF_VALUE: 19
PIF_VALUE: 10
PIF_VALUE: 18
PIF_VALUE: 18
PIF_VALUE: 22
PIF_VALUE: 1
PIF_VALUE: 2
PIF_VALUE: 18
PIF_VALUE: 19
PIF_VALUE: 3
PIF_VALUE: 18
PIF_VALUE: 19
PIF_VALUE: 20
PIF_VALUE: 19
PIF_VALUE: 22
PIF_VALUE: 19
PIF_VALUE: 19
PIF_VALUE: 18
PIF_VALUE: 18
PIF_VALUE: 17
PIF_VALUE: 18
PIF_VALUE: 5
PIF_VALUE: 18
PIF_VALUE: 17
PIF_VALUE: 19
PIF_VALUE: 1
PIF_VALUE: 19
PIF_VALUE: 13
PIF_VALUE: 21
PIF_VALUE: 18
PIF_VALUE: 22
PIF_VALUE: 18

## 2018-09-28 ASSESSMENT — PAIN SCALES - GENERAL
PAINLEVEL_OUTOF10: 8
PAINLEVEL_OUTOF10: 9
PAINLEVEL_OUTOF10: 8
PAINLEVEL_OUTOF10: 0
PAINLEVEL_OUTOF10: 10
PAINLEVEL_OUTOF10: 9
PAINLEVEL_OUTOF10: 5
PAINLEVEL_OUTOF10: 10
PAINLEVEL_OUTOF10: 9
PAINLEVEL_OUTOF10: 5
PAINLEVEL_OUTOF10: 9
PAINLEVEL_OUTOF10: 9
PAINLEVEL_OUTOF10: 10

## 2018-09-28 ASSESSMENT — PAIN DESCRIPTION - ONSET
ONSET: ON-GOING
ONSET: ON-GOING

## 2018-09-28 ASSESSMENT — PAIN DESCRIPTION - PAIN TYPE
TYPE: CHRONIC PAIN;SURGICAL PAIN
TYPE: SURGICAL PAIN
TYPE: CHRONIC PAIN;SURGICAL PAIN
TYPE: SURGICAL PAIN
TYPE: SURGICAL PAIN

## 2018-09-28 ASSESSMENT — PAIN DESCRIPTION - LOCATION
LOCATION: BACK;HIP
LOCATION: BACK

## 2018-09-28 ASSESSMENT — LIFESTYLE VARIABLES: SMOKING_STATUS: 0

## 2018-09-28 ASSESSMENT — PAIN DESCRIPTION - PROGRESSION
CLINICAL_PROGRESSION: NOT CHANGED

## 2018-09-28 ASSESSMENT — PAIN - FUNCTIONAL ASSESSMENT: PAIN_FUNCTIONAL_ASSESSMENT: 0-10

## 2018-09-28 ASSESSMENT — PAIN DESCRIPTION - FREQUENCY
FREQUENCY: CONTINUOUS
FREQUENCY: CONTINUOUS

## 2018-09-28 ASSESSMENT — ACTIVITIES OF DAILY LIVING (ADL): EFFECT OF PAIN ON DAILY ACTIVITIES: `

## 2018-09-28 ASSESSMENT — PAIN DESCRIPTION - ORIENTATION: ORIENTATION: MID;RIGHT

## 2018-09-28 NOTE — BRIEF OP NOTE
Brief Postoperative Note  ______________________________________________________________    Patient: Clement Patel  YOB: 1965  MRN: [id number]  Date of Procedure: 9/28/2018    Pre-Op Diagnosis:   1) Prior C6-C7 anterior cervical discectomy and fusion. 2) Chronic pain syndrome. Post-Op Diagnosis: Same       Procedure(s):  T9-10 SPINAL CORD STIMULATOR AND BATTERY (RIGHT BUTTOCK) IMPLANT    Anesthesia: General    Surgeon(s):  Emmanuel Rahman MD    Staff:  [employer]: Diana Lam  Physician Assistant: BENNETT Ibarra; Talisha Garibay. FLAVIA Rangel     Estimated Blood Loss: 50 cc    Complications: None    Specimens:   * No specimens in log *    Implants:    Implant Name Type Inv.  Item Serial No.  Lot No. LRB No. Used   click [place]     [id number] N/A 1   BAND TISSUE SUTURE SYSTEM FIXATE - 2 PK Spine:Stimulator BAND TISSUE SUTURE SYSTEM FIXATE - 2 PK  [employer] [id number] N/A 1   KIT IPG SPECTRA WAVEWRwripl - [id number] Spine:Stimulator KIT IPG SPECTRA WAVEWRITER [id number] [employer]: INTERVENTIONAL CARDIO  N/A 1   LEAD SURGICAL 32 COVEREDGE 50CM - [id number] Spine:Stimulator LEAD SURGICAL 32 COVEREDGE 50CM [id number] [employer]: INTERVENTIONAL CARDIO   N/A 1         Drains:   Closed/Suction Drain Back Accordion 7 [place] (Active)       NG/OG Tube Orogastric 18 fr Right mouth (Active)       Urethral Catheter 16 fr (Active)       Findings: No significant findings    BENNETT Ibarra  Date: 9/28/2018  Time: 8:37 AM

## 2018-09-28 NOTE — OP NOTE
[address] [address]     OPERATIVE REPORT     PATIENT NAME: Lucila Pineda                              : 1965  MED REC NO:  [id number]                         ACCOUNT NO: [de-identified]                               ROOM: 012  ADMISSION DATE: 2018    PROVIDER:  Carina Gautam M.D.     DATE OF PROCEDURE: 2018     PREOPERATIVE DIAGNOSES:  1. Chronic pain syndrome. 2.  Prior C6-C7 anterior cervical discectomy and fusion     POSTOPERATIVE DIAGNOSES:  1. Chronic pain syndrome. 2.  Prior C6-C7 anterior cervical discectomy and fusion     OPERATIONS PERFORMED:  1.  T9-10 spinal cord stimulator implantation. 2.  Battery pack for spinal cord stimulator implantation, right buttock.     SURGEON:  Estuardo Friedman M.D.     ASSISTANT:  Doug Ervin PA-C     ANESTHESIA:  General.     INDICATIONS:  This is a 50-year-old female with a chronic history of back pain and lower extremity pain. MRI cervical, thoracic and lumbar spine did not show any significant stenosis other than myelomalacia at C6-C7 from a prior injury. She had a spinal cord stimulator trial in the past with good relief of her symptoms. Patient had tried full conservative therapy including medication management, epidural steroid injection, SNRB, chiropractic treatment, and physical therapy. Patient had a spinal cord stimulator trial with good relief of their symptoms. Due to the persistence of symptoms and failure of conservative treatment, patient elected to proceed with surgical treatment and permanent placement. Patient, therefore, understood the indication for the surgery as well as its risks, benefits, and alternatives.   These risks include, but are not limited to paralysis, infection, dural tear, nerve root injury, nonunion, persistent pain, etc.  All questions were answered and informed consent was obtained.     DESCRIPTION OF THE PROCEDURE:  The patient was taken to the operating room by
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
## 2018-09-29 VITALS
WEIGHT: 189.4 LBS | RESPIRATION RATE: 18 BRPM | BODY MASS INDEX: 29.73 KG/M2 | HEIGHT: 67 IN | DIASTOLIC BLOOD PRESSURE: 55 MMHG | SYSTOLIC BLOOD PRESSURE: 99 MMHG | TEMPERATURE: 98 F | OXYGEN SATURATION: 96 % | HEART RATE: 66 BPM

## 2018-09-29 PROCEDURE — 6370000000 HC RX 637 (ALT 250 FOR IP): Performed by: PHYSICIAN ASSISTANT

## 2018-09-29 PROCEDURE — 2580000003 HC RX 258: Performed by: PHYSICIAN ASSISTANT

## 2018-09-29 PROCEDURE — 6360000002 HC RX W HCPCS: Performed by: PHYSICIAN ASSISTANT

## 2018-09-29 PROCEDURE — 96376 TX/PRO/DX INJ SAME DRUG ADON: CPT

## 2018-09-29 RX ADMIN — GABAPENTIN 800 MG: 400 CAPSULE ORAL at 08:59

## 2018-09-29 RX ADMIN — MORPHINE SULFATE 2 MG: 2 INJECTION, SOLUTION INTRAMUSCULAR; INTRAVENOUS at 01:33

## 2018-09-29 RX ADMIN — FAMOTIDINE 20 MG: 20 TABLET, FILM COATED ORAL at 08:59

## 2018-09-29 RX ADMIN — DOCUSATE SODIUM 100 MG: 100 CAPSULE, LIQUID FILLED ORAL at 08:59

## 2018-09-29 RX ADMIN — OXYCODONE HYDROCHLORIDE AND ACETAMINOPHEN 2 TABLET: 5; 325 TABLET ORAL at 06:36

## 2018-09-29 RX ADMIN — MORPHINE SULFATE 2 MG: 2 INJECTION, SOLUTION INTRAMUSCULAR; INTRAVENOUS at 04:29

## 2018-09-29 RX ADMIN — Medication 10 ML: at 08:59

## 2018-09-29 RX ADMIN — CYCLOBENZAPRINE HYDROCHLORIDE 10 MG: 10 TABLET, FILM COATED ORAL at 06:36

## 2018-09-29 RX ADMIN — DULOXETINE HYDROCHLORIDE 60 MG: 60 CAPSULE, DELAYED RELEASE ORAL at 08:59

## 2018-09-29 RX ADMIN — MORPHINE SULFATE 2 MG: 2 INJECTION, SOLUTION INTRAMUSCULAR; INTRAVENOUS at 08:59

## 2018-09-29 RX ADMIN — OXYCODONE HYDROCHLORIDE AND ACETAMINOPHEN 2 TABLET: 5; 325 TABLET ORAL at 00:12

## 2018-09-29 ASSESSMENT — PAIN SCALES - GENERAL
PAINLEVEL_OUTOF10: 0
PAINLEVEL_OUTOF10: 8
PAINLEVEL_OUTOF10: 7
PAINLEVEL_OUTOF10: 8
PAINLEVEL_OUTOF10: 7
PAINLEVEL_OUTOF10: 10
PAINLEVEL_OUTOF10: 0
PAINLEVEL_OUTOF10: 0

## 2018-09-29 ASSESSMENT — PAIN DESCRIPTION - PAIN TYPE
TYPE: SURGICAL PAIN
TYPE: SURGICAL PAIN

## 2018-09-29 ASSESSMENT — PAIN DESCRIPTION - LOCATION
LOCATION: BACK
LOCATION: BACK

## 2018-09-29 ASSESSMENT — PAIN DESCRIPTION - DESCRIPTORS
DESCRIPTORS: ACHING
DESCRIPTORS: ACHING

## 2018-09-29 NOTE — PROGRESS NOTES
Discharge teaching and instructions for completed with patient using teachback method. AVS reviewed. Patient voiced understanding regarding prescriptions, follow up appointments, and care of self at home. Discharged in a wheelchair to  home with support per family. Denies further questions, concerns, or needs at this time.

## 2018-09-29 NOTE — PROGRESS NOTES
Department of Orthopedic Surgery  Spine Service  Attending Progress Note        Subjective:  Doing ok, c/o incision pain    Vitals  VITALS:  BP (!) 99/55   Pulse 66   Temp 98 °F (36.7 °C) (Oral)   Resp 18   Ht 5' 7\" (1.702 m)   Wt 189 lb 6.4 oz (85.9 kg)   SpO2 96%   BMI 29.66 kg/m²   24HR INTAKE/OUTPUT:    Intake/Output Summary (Last 24 hours) at 09/29/18 0740  Last data filed at 09/29/18 0430   Gross per 24 hour   Intake          4880.04 ml   Output            132.5 ml   Net          4747.54 ml     URINARY CATHETER OUTPUT (Dejesus):     DRAIN/TUBE OUTPUT:  Closed/Suction Drain Back Accordion 7 [place]-Output (ml): 10 ml      PHYSICAL EXAM:    Orientation:  alert and oriented to person, place and time    Incision:  dressing in place, clean, dry, intact    Lower Extremity Motor :  quadriceps, extensor hallucis longus, dorsiflexion, plantarflexion 5/5 bilaterally  Lower Extremity Sensory:  Intact L1-S1    Flatus:  positive    ABNORMAL EXAM FINDINGS:  none    LABS:    HgB:    Lab Results   Component Value Date    HGB 12.6 09/10/2018       ASSESSMENT AND PLAN:    Post operative day 1 status post thoracic SCS    1:  Monitor labs and remove drain  2:  Activity Level:  As tolerated  3:  Pain Control:  ok  4:  Discharge Planning:  today    Fang Fowler [facility]

## 2018-09-29 NOTE — PLAN OF CARE
Outcome: Ongoing  Patient will need some assistance completing her daily care. Problem: Discharge Planning:  Goal: Patients continuum of care needs are met  Patients continuum of care needs are met   Outcome: Ongoing  Patient plans to return home with spouse when discharged. Comments: Care plan reviewed with patient. Patient verbalizes understanding of the plan of care and contributes to goal setting.

## 2018-10-01 ENCOUNTER — TELEPHONE (OUTPATIENT)
Dept: FAMILY MEDICINE CLINIC | Age: 53
End: 2018-10-01

## 2018-10-01 NOTE — TELEPHONE ENCOUNTER
After transferring the patient to the our [place] [address]  to schedule her, she became somewhat agitated and was not receptive to schedule an appointment. She states she will think about it and call us back.

## 2018-10-17 ENCOUNTER — OFFICE VISIT (OUTPATIENT)
Dept: PHYSICAL MEDICINE AND REHAB | Age: 53
End: 2018-10-17
Payer: MEDICARE

## 2018-10-17 VITALS — HEART RATE: 75 BPM | SYSTOLIC BLOOD PRESSURE: 134 MMHG | DIASTOLIC BLOOD PRESSURE: 85 MMHG

## 2018-10-17 DIAGNOSIS — G89.29 CHRONIC BILATERAL LOW BACK PAIN, WITH SCIATICA PRESENCE UNSPECIFIED: ICD-10-CM

## 2018-10-17 DIAGNOSIS — M25.532 LEFT WRIST PAIN: ICD-10-CM

## 2018-10-17 DIAGNOSIS — G82.54 INCOMPLETE QUADRIPLEGIA AT C5-C8 LEVEL (HCC): ICD-10-CM

## 2018-10-17 DIAGNOSIS — M47.816 SPONDYLOSIS OF LUMBAR REGION WITHOUT MYELOPATHY OR RADICULOPATHY: ICD-10-CM

## 2018-10-17 DIAGNOSIS — G89.29 OTHER CHRONIC PAIN: ICD-10-CM

## 2018-10-17 DIAGNOSIS — G89.4 CHRONIC PAIN SYNDROME: ICD-10-CM

## 2018-10-17 DIAGNOSIS — M47.12 SPONDYLOSIS OF CERVICAL JOINT WITH MYELOPATHY: Primary | ICD-10-CM

## 2018-10-17 DIAGNOSIS — M54.5 CHRONIC BILATERAL LOW BACK PAIN, WITH SCIATICA PRESENCE UNSPECIFIED: ICD-10-CM

## 2018-10-17 DIAGNOSIS — M54.2 NECK PAIN: ICD-10-CM

## 2018-10-17 DIAGNOSIS — G62.9 NEUROPATHY: ICD-10-CM

## 2018-10-17 DIAGNOSIS — M54.16 LUMBAR RADICULITIS: ICD-10-CM

## 2018-10-17 DIAGNOSIS — M48.062 SPINAL STENOSIS OF LUMBAR REGION WITH NEUROGENIC CLAUDICATION: ICD-10-CM

## 2018-10-17 PROCEDURE — G8427 DOCREV CUR MEDS BY ELIG CLIN: HCPCS | Performed by: NURSE PRACTITIONER

## 2018-10-17 PROCEDURE — 99214 OFFICE O/P EST MOD 30 MIN: CPT | Performed by: NURSE PRACTITIONER

## 2018-10-17 PROCEDURE — G8484 FLU IMMUNIZE NO ADMIN: HCPCS | Performed by: NURSE PRACTITIONER

## 2018-10-17 PROCEDURE — 3017F COLORECTAL CA SCREEN DOC REV: CPT | Performed by: NURSE PRACTITIONER

## 2018-10-17 PROCEDURE — G8417 CALC BMI ABV UP PARAM F/U: HCPCS | Performed by: NURSE PRACTITIONER

## 2018-10-17 PROCEDURE — 1036F TOBACCO NON-USER: CPT | Performed by: NURSE PRACTITIONER

## 2018-10-17 RX ORDER — MORPHINE SULFATE 15 MG/1
15 TABLET, FILM COATED, EXTENDED RELEASE ORAL EVERY 12 HOURS PRN
Qty: 60 TABLET | Refills: 0 | Status: SHIPPED | OUTPATIENT
Start: 2018-10-20 | End: 2018-11-15 | Stop reason: SDUPTHER

## 2018-10-17 RX ORDER — GABAPENTIN 800 MG/1
TABLET ORAL
Qty: 90 TABLET | Refills: 0 | Status: SHIPPED | OUTPATIENT
Start: 2018-10-19 | End: 2018-12-17 | Stop reason: SDUPTHER

## 2018-10-17 RX ORDER — HYDROCODONE BITARTRATE AND ACETAMINOPHEN 7.5; 325 MG/1; MG/1
1 TABLET ORAL EVERY 8 HOURS PRN
Qty: 90 TABLET | Refills: 0 | Status: SHIPPED | OUTPATIENT
Start: 2018-10-20 | End: 2018-11-15 | Stop reason: SDUPTHER

## 2018-10-17 ASSESSMENT — ENCOUNTER SYMPTOMS: BACK PAIN: 1

## 2018-10-17 NOTE — PROGRESS NOTES
foraminal narrowing.       No suspicious abnormality is identified within the visualized paraspinal soft tissues.           Impression       1. Hyperintense T2 signal is noted within the spinal cord at the C6-7 level which likely corresponds to myelomalacia.       2. Postsurgical and multilevel degenerative changes are present within the cervical spine and are further discussed by level in the findings. No significant spinal canal narrowing is identified. Uncovertebral joint spurring is present at multiple levels    resulting in mild neural foraminal narrowing.       3. There is a 0.9 cm hyperintense T2 lesion within the T4 vertebral body with slightly hypointense T1 signal. This likely represents an atypical hemangioma however, follow-up exam in 3 months to document stability would be beneficial.       The patientis allergic to ibuprofen. Past Medical History  Fritz Healy  has a past medical history of Depression; DM2 (diabetes mellitus, type 2) ([address] Endometriosis; Esophageal stricture; Female bladder prolapse; Former smoker; History of gastric bypass; History of hypertension; Hypotension; Iron deficiency anemia; Obesity; Pulmonary nodule; Spinal cord injury, C5-C7 ([address] Vitamin D deficiency; and Wears glasses. Past Surgical History  The patient  has a past surgical history that includes Gastric bypass surgery (); Hysterectomy ();  section, low transverse; Cholecystectomy; Tonsillectomy; Foot surgery (3/3/15); bladder suspension (11); Upper gastrointestinal endoscopy (5 20 15); Esophagus dilation (5 20 15); Neck surgery (2015); other surgical history (2015); other surgical history (6/10/2016); other surgical history (Left, 2016); Leg Surgery (Left, 2017); other surgical history (10/16/2017); pr njx dx/ther sbst epidural/subarach lumbar/sacral (N/A, 10/16/2017); other surgical history (Bilateral, 2017);  Nerve Block Lumb Facet Level 1 Bilateral (Bilateral,

## 2018-10-22 RX ORDER — DULOXETIN HYDROCHLORIDE 60 MG/1
CAPSULE, DELAYED RELEASE ORAL
Qty: 30 CAPSULE | Refills: 1 | Status: SHIPPED | OUTPATIENT
Start: 2018-10-22 | End: 2018-12-19 | Stop reason: SDUPTHER

## 2018-10-22 RX ORDER — MIRTAZAPINE 15 MG/1
15 TABLET, FILM COATED ORAL NIGHTLY
Qty: 30 TABLET | Refills: 2 | Status: SHIPPED | OUTPATIENT
Start: 2018-10-22 | End: 2019-01-18 | Stop reason: SDUPTHER

## 2018-10-25 ENCOUNTER — TELEPHONE (OUTPATIENT)
Dept: PHYSICAL MEDICINE AND REHAB | Age: 53
End: 2018-10-25

## 2018-11-15 ENCOUNTER — HOSPITAL ENCOUNTER (OUTPATIENT)
Dept: GENERAL RADIOLOGY | Age: 53
Discharge: HOME | End: 2018-11-15
Payer: MEDICARE

## 2018-11-15 ENCOUNTER — HOSPITAL ENCOUNTER (OUTPATIENT)
Age: 53
Discharge: HOME | End: 2018-11-15
Payer: MEDICARE

## 2018-11-15 ENCOUNTER — OFFICE VISIT (OUTPATIENT)
Dept: FAMILY MEDICINE CLINIC | Age: 53
End: 2018-11-15
Payer: MEDICARE

## 2018-11-15 VITALS — SYSTOLIC BLOOD PRESSURE: 128 MMHG | BODY MASS INDEX: 30.38 KG/M2 | WEIGHT: 194 LBS | DIASTOLIC BLOOD PRESSURE: 80 MMHG

## 2018-11-15 DIAGNOSIS — M25.562 ACUTE PAIN OF LEFT KNEE: Primary | ICD-10-CM

## 2018-11-15 DIAGNOSIS — M25.521 RIGHT ELBOW PAIN: ICD-10-CM

## 2018-11-15 DIAGNOSIS — E11.9 TYPE 2 DIABETES MELLITUS WITHOUT COMPLICATION, WITHOUT LONG-TERM CURRENT USE OF INSULIN (HCC): Chronic | ICD-10-CM

## 2018-11-15 DIAGNOSIS — G89.29 OTHER CHRONIC PAIN: ICD-10-CM
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
## 2018-11-15 DIAGNOSIS — M25.562 ACUTE PAIN OF LEFT KNEE: ICD-10-CM

## 2018-11-15 DIAGNOSIS — G89.4 CHRONIC PAIN SYNDROME: ICD-10-CM

## 2018-11-15 PROCEDURE — 73564 X-RAY EXAM KNEE 4 OR MORE: CPT

## 2018-11-15 PROCEDURE — 99214 OFFICE O/P EST MOD 30 MIN: CPT | Performed by: FAMILY MEDICINE

## 2018-11-15 PROCEDURE — 73080 X-RAY EXAM OF ELBOW: CPT

## 2018-11-15 PROCEDURE — 3044F HG A1C LEVEL LT 7.0%: CPT | Performed by: FAMILY MEDICINE

## 2018-11-15 PROCEDURE — 2022F DILAT RTA XM EVC RTNOPTHY: CPT | Performed by: FAMILY MEDICINE

## 2018-11-15 PROCEDURE — 1036F TOBACCO NON-USER: CPT | Performed by: FAMILY MEDICINE

## 2018-11-15 PROCEDURE — G8417 CALC BMI ABV UP PARAM F/U: HCPCS | Performed by: FAMILY MEDICINE

## 2018-11-15 PROCEDURE — G8427 DOCREV CUR MEDS BY ELIG CLIN: HCPCS | Performed by: FAMILY MEDICINE

## 2018-11-15 PROCEDURE — G8484 FLU IMMUNIZE NO ADMIN: HCPCS | Performed by: FAMILY MEDICINE

## 2018-11-15 PROCEDURE — 3017F COLORECTAL CA SCREEN DOC REV: CPT | Performed by: FAMILY MEDICINE

## 2018-11-15 RX ORDER — MORPHINE SULFATE 15 MG/1
15 TABLET, FILM COATED, EXTENDED RELEASE ORAL EVERY 12 HOURS PRN
Qty: 60 TABLET | Refills: 0 | Status: SHIPPED | OUTPATIENT
Start: 2018-11-18 | End: 2018-12-17 | Stop reason: SDUPTHER

## 2018-11-15 RX ORDER — HYDROCODONE BITARTRATE AND ACETAMINOPHEN 7.5; 325 MG/1; MG/1
1 TABLET ORAL EVERY 8 HOURS PRN
Qty: 90 TABLET | Refills: 0 | Status: SHIPPED | OUTPATIENT
Start: 2018-11-18 | End: 2018-12-17 | Stop reason: SDUPTHER

## 2018-11-15 ASSESSMENT — ENCOUNTER SYMPTOMS
RHINORRHEA: 0
COLOR CHANGE: 0
VOMITING: 0
EYE REDNESS: 0
CHEST TIGHTNESS: 0
NAUSEA: 0
SHORTNESS OF BREATH: 0
EYE DISCHARGE: 0

## 2018-11-15 NOTE — PROGRESS NOTES
[address] [place] [address] [address], [address], [place]  Phone:  [phone]  PWB:[phone]       Name: Jez William  : 1965    Chief Complaint   Patient presents with    Knee Pain     left knee pain, fell 1 week ago   trouble with walking, bending   hit the knee on a curb        HPI:     Jez William is a 46 y.o. female who presents today for evaluation of left knee pain. Last week her left knee gave out and she fell and struck her knee on a curb. It has hurt since and she has pain with bending it and with walking. Pain is directly over the kneecap. She thought it would improve but it hasn't. She also has right elbow pain from a fall the week prior. She is able to fully move the right arm, but the elbow is TTP. She states that last year she did a lot of PT for strengthening and it helped some, but she's not interested in it at this time. Knee Pain    The incident occurred more than 1 week ago. The injury mechanism was a fall. The pain is present in the left knee. The pain is severe. The pain has been constant since onset. Associated symptoms include a loss of motion. Pertinent negatives include no inability to bear weight, loss of sensation or tingling. She reports no foreign bodies present. The symptoms are aggravated by movement and palpation. She has tried NSAIDs and rest (narcotics) for the symptoms. The treatment provided mild relief. Arm Pain    The incident occurred more than 1 week ago. The injury mechanism was a fall. The pain is present in the right elbow. The pain does not radiate. The pain has been constant since the incident. Pertinent negatives include no chest pain or tingling. Nothing aggravates the symptoms. Current Outpatient Prescriptions:     [START ON 2018] HYDROcodone-acetaminophen (NORCO) 7.5-325 MG per tablet, Take 1 tablet by mouth every 8 hours as needed for Pain for up to 30 days. ., Disp: 90 tablet, Rfl: 0    [START ON tightness and shortness of breath. Cardiovascular: Negative for chest pain and palpitations. Gastrointestinal: Negative for nausea and vomiting. Musculoskeletal: Positive for arthralgias, joint swelling and myalgias. Skin: Positive for wound. Negative for color change. Allergic/Immunologic: Negative for environmental allergies and food allergies. Neurological: Negative for tingling. Hematological: Negative for adenopathy. Bruises/bleeds easily. Objective:     /80 (Site: Left Upper Arm, Position: Sitting, Cuff Size: Large Adult)   Wt 194 lb (88 kg)   BMI 30.38 kg/m²     Physical Exam   Constitutional: She is oriented to person, place, and time. She appears well-developed and well-nourished. No distress. HENT:   Head: Normocephalic and atraumatic. Nose: Nose normal.   Eyes: Conjunctivae and EOM are normal.   Neck: Normal range of motion. Neck supple. Cardiovascular: Normal rate and regular rhythm. Pulmonary/Chest: Effort normal and breath sounds normal. No respiratory distress. She has no wheezes. Musculoskeletal:        Right elbow: She exhibits normal range of motion and no swelling. Tenderness found. Lateral epicondyle tenderness noted. Left knee: She exhibits ecchymosis and bony tenderness. Tenderness found. Medial joint line, lateral joint line and patellar tendon tenderness noted. Neurological: She is alert and oriented to person, place, and time. Skin: Skin is warm and dry. No rash noted. No erythema. Nursing note and vitals reviewed. Assessment/Plan:     [place] was seen today for knee pain. Diagnoses and all orders for this visit:    Acute pain of left knee  -     Patient has acute pain over her left patella after recent fall so will check x-rays to ensure no fracture. -     XR KNEE LEFT (MIN 4 VIEWS); Future  -     Elastic Bandages & Supports (KNEE BRACE) MISC; Dispense 1 knee brace for left knee.     Fall, initial encounter  -     Patient declines

## 2018-11-16 ENCOUNTER — TELEPHONE (OUTPATIENT)
Dept: FAMILY MEDICINE CLINIC | Age: 53
End: 2018-11-16

## 2018-11-16 NOTE — TELEPHONE ENCOUNTER
----- Message from Meghan Henriquez MD sent at 11/16/2018  8:43 AM EST -----  Elbow xray was unremarkable.

## 2018-12-10 ENCOUNTER — PREP FOR PROCEDURE (OUTPATIENT)
Dept: PHYSICAL MEDICINE AND REHAB | Age: 53
End: 2018-12-10

## 2018-12-10 NOTE — H&P
spinal cord at the C6-7 level which likely corresponds to myelomalacia. 2. Postsurgical and multilevel degenerative changes are present within the cervical spine and are further discussed by level in the findings. No significant spinal canal narrowing is identified. Uncovertebral joint spurring is present at multiple levels    resulting in mild neural foraminal narrowing. 3. There is a 0.9 cm hyperintense T2 lesion within the T4 vertebral body with slightly hypointense T1 signal. This likely represents an atypical hemangioma however, follow-up exam in 3 months to document stability would be beneficial.         The patientis allergic to ibuprofen. Past Medical History  Marcelina De La Rosa  has a past medical history of Depression; DM2 (diabetes mellitus, type 2) ([address] Endometriosis; Esophageal stricture; Female bladder prolapse; Former smoker; History of gastric bypass; History of hypertension; Hypotension; Iron deficiency anemia; Obesity; Pulmonary nodule; Spinal cord injury, C5-C7 ([address] Vitamin D deficiency; and Wears glasses. Past Surgical History  The patient  has a past surgical history that includes Gastric bypass surgery (); Hysterectomy ();  section, low transverse; Cholecystectomy; Tonsillectomy; Foot surgery (3/3/15); bladder suspension ( 2 11); Upper gastrointestinal endoscopy ( 20 15); Esophagus dilation ( 20 15); Neck surgery (2015); other surgical history (2015); other surgical history (6/10/2016); other surgical history (Left, 2016); Leg Surgery (Left, 2017); other surgical history (10/16/2017); pr njx dx/ther sbst epidural/subarach lumbar/sacral (N/A, 10/16/2017); other surgical history (Bilateral, 2017);  Nerve Block Lumb Facet Level 1 Bilateral (Bilateral, 2017); other surgical history (Left, 2018); pr inject anes/steroid foramen lumbar/sacral w img guide ,ea add level (Left, 3/12/2018); pr office/outpt visit,procedure only (N/A,

## 2018-12-17 DIAGNOSIS — G89.29 OTHER CHRONIC PAIN: ICD-10-CM

## 2018-12-17 DIAGNOSIS — G89.4 CHRONIC PAIN SYNDROME: ICD-10-CM

## 2018-12-17 RX ORDER — GABAPENTIN 800 MG/1
TABLET ORAL
Qty: 90 TABLET | Refills: 2 | Status: SHIPPED | OUTPATIENT
Start: 2018-12-17 | End: 2019-01-28 | Stop reason: SDUPTHER

## 2018-12-17 RX ORDER — HYDROCODONE BITARTRATE AND ACETAMINOPHEN 7.5; 325 MG/1; MG/1
1 TABLET ORAL EVERY 8 HOURS PRN
Qty: 90 TABLET | Refills: 0 | Status: SHIPPED | OUTPATIENT
Start: 2018-12-19 | End: 2019-01-15 | Stop reason: SDUPTHER

## 2018-12-17 RX ORDER — MORPHINE SULFATE 15 MG/1
15 TABLET, FILM COATED, EXTENDED RELEASE ORAL EVERY 12 HOURS PRN
Qty: 60 TABLET | Refills: 0 | Status: SHIPPED | OUTPATIENT
Start: 2018-12-19 | End: 2019-01-15 | Stop reason: SDUPTHER

## 2018-12-18 ENCOUNTER — APPOINTMENT (OUTPATIENT)
Dept: GENERAL RADIOLOGY | Age: 53
End: 2018-12-18
Attending: PAIN MEDICINE
Payer: MEDICARE

## 2018-12-18 ENCOUNTER — ANESTHESIA EVENT (OUTPATIENT)
Dept: OPERATING ROOM | Age: 53
End: 2018-12-18
Payer: MEDICARE

## 2018-12-18 ENCOUNTER — ANESTHESIA (OUTPATIENT)
Dept: OPERATING ROOM | Age: 53
End: 2018-12-18
Payer: MEDICARE

## 2018-12-18 ENCOUNTER — HOSPITAL ENCOUNTER (OUTPATIENT)
Age: 53
Setting detail: OUTPATIENT SURGERY
Discharge: HOME | End: 2018-12-18
Attending: PAIN MEDICINE | Admitting: PAIN MEDICINE
Payer: MEDICARE

## 2018-12-18 VITALS
TEMPERATURE: 98.4 F | WEIGHT: 187 LBS | BODY MASS INDEX: 29.35 KG/M2 | HEIGHT: 67 IN | OXYGEN SATURATION: 98 % | DIASTOLIC BLOOD PRESSURE: 63 MMHG | HEART RATE: 62 BPM | SYSTOLIC BLOOD PRESSURE: 134 MMHG | RESPIRATION RATE: 16 BRPM

## 2018-12-18 VITALS
RESPIRATION RATE: 14 BRPM | SYSTOLIC BLOOD PRESSURE: 144 MMHG | OXYGEN SATURATION: 100 % | DIASTOLIC BLOOD PRESSURE: 76 MMHG

## 2018-12-18 LAB — GLUCOSE BLD-MCNC: 72 MG/DL (ref 70–108)

## 2018-12-18 PROCEDURE — 2709999900 HC NON-CHARGEABLE SUPPLY: Performed by: PAIN MEDICINE

## 2018-12-18 PROCEDURE — 7100000011 HC PHASE II RECOVERY - ADDTL 15 MIN: Performed by: PAIN MEDICINE

## 2018-12-18 PROCEDURE — 2500000003 HC RX 250 WO HCPCS: Performed by: PAIN MEDICINE

## 2018-12-18 PROCEDURE — 82948 REAGENT STRIP/BLOOD GLUCOSE: CPT

## 2018-12-18 PROCEDURE — 3700000001 HC ADD 15 MINUTES (ANESTHESIA): Performed by: PAIN MEDICINE

## 2018-12-18 PROCEDURE — 3700000000 HC ANESTHESIA ATTENDED CARE: Performed by: PAIN MEDICINE

## 2018-12-18 PROCEDURE — 3600000057 HC PAIN LEVEL 4 ADDL 15 MIN: Performed by: PAIN MEDICINE

## 2018-12-18 PROCEDURE — 64490 INJ PARAVERT F JNT C/T 1 LEV: CPT | Performed by: PAIN MEDICINE

## 2018-12-18 PROCEDURE — 3209999900 FLUORO FOR SURGICAL PROCEDURES

## 2018-12-18 PROCEDURE — 6360000002 HC RX W HCPCS: Performed by: PAIN MEDICINE

## 2018-12-18 PROCEDURE — 2500000003 HC RX 250 WO HCPCS: Performed by: NURSE ANESTHETIST, CERTIFIED REGISTERED

## 2018-12-18 PROCEDURE — 64492 INJ PARAVERT F JNT C/T 3 LEV: CPT | Performed by: PAIN MEDICINE

## 2018-12-18 PROCEDURE — 7100000010 HC PHASE II RECOVERY - FIRST 15 MIN: Performed by: PAIN MEDICINE

## 2018-12-18 PROCEDURE — 6360000002 HC RX W HCPCS: Performed by: NURSE ANESTHETIST, CERTIFIED REGISTERED

## 2018-12-18 PROCEDURE — 64491 INJ PARAVERT F JNT C/T 2 LEV: CPT | Performed by: PAIN MEDICINE

## 2018-12-18 PROCEDURE — 3600000056 HC PAIN LEVEL 4 BASE: Performed by: PAIN MEDICINE

## 2018-12-18 RX ORDER — FENTANYL CITRATE 50 UG/ML
INJECTION, SOLUTION INTRAMUSCULAR; INTRAVENOUS PRN
Status: DISCONTINUED | OUTPATIENT
Start: 2018-12-18 | End: 2018-12-18 | Stop reason: SDUPTHER

## 2018-12-18 RX ORDER — LIDOCAINE HYDROCHLORIDE 10 MG/ML
INJECTION, SOLUTION INFILTRATION; PERINEURAL PRN
Status: DISCONTINUED | OUTPATIENT
Start: 2018-12-18 | End: 2018-12-18 | Stop reason: HOSPADM

## 2018-12-18 RX ORDER — ROPIVACAINE HYDROCHLORIDE 2 MG/ML
INJECTION, SOLUTION EPIDURAL; INFILTRATION; PERINEURAL PRN
Status: DISCONTINUED | OUTPATIENT
Start: 2018-12-18 | End: 2018-12-18 | Stop reason: HOSPADM

## 2018-12-18 RX ORDER — DEXAMETHASONE SODIUM PHOSPHATE 4 MG/ML
INJECTION, SOLUTION INTRA-ARTICULAR; INTRALESIONAL; INTRAMUSCULAR; INTRAVENOUS; SOFT TISSUE PRN
Status: DISCONTINUED | OUTPATIENT
Start: 2018-12-18 | End: 2018-12-18 | Stop reason: HOSPADM

## 2018-12-18 RX ORDER — LIDOCAINE HYDROCHLORIDE 10 MG/ML
INJECTION, SOLUTION INFILTRATION; PERINEURAL PRN
Status: DISCONTINUED | OUTPATIENT
Start: 2018-12-18 | End: 2018-12-18 | Stop reason: SDUPTHER

## 2018-12-18 RX ADMIN — FENTANYL CITRATE 50 MCG: 50 INJECTION INTRAMUSCULAR; INTRAVENOUS at 09:48

## 2018-12-18 RX ADMIN — LIDOCAINE HYDROCHLORIDE 20 MG: 10 INJECTION, SOLUTION INFILTRATION; PERINEURAL at 09:45

## 2018-12-18 RX ADMIN — FENTANYL CITRATE 100 MCG: 50 INJECTION INTRAMUSCULAR; INTRAVENOUS at 09:45

## 2018-12-18 RX ADMIN — FENTANYL CITRATE 50 MCG: 50 INJECTION INTRAMUSCULAR; INTRAVENOUS at 09:54

## 2018-12-18 ASSESSMENT — PAIN DESCRIPTION - ORIENTATION: ORIENTATION: LEFT

## 2018-12-18 ASSESSMENT — PAIN DESCRIPTION - FREQUENCY: FREQUENCY: INTERMITTENT

## 2018-12-18 ASSESSMENT — PULMONARY FUNCTION TESTS
PIF_VALUE: 0

## 2018-12-18 ASSESSMENT — PAIN DESCRIPTION - DESCRIPTORS
DESCRIPTORS: ACHING
DESCRIPTORS: ACHING;CONSTANT;DISCOMFORT

## 2018-12-18 ASSESSMENT — PAIN SCALES - GENERAL: PAINLEVEL_OUTOF10: 5

## 2018-12-18 ASSESSMENT — PAIN DESCRIPTION - PAIN TYPE: TYPE: CHRONIC PAIN

## 2018-12-18 ASSESSMENT — PAIN DESCRIPTION - LOCATION: LOCATION: NECK

## 2018-12-18 ASSESSMENT — PAIN - FUNCTIONAL ASSESSMENT: PAIN_FUNCTIONAL_ASSESSMENT: 0-10

## 2018-12-18 NOTE — H&P
[address] [place] [address]  History and Physical Update    Pt Name: Sandra Kennedy  MRN: [id number]  YOB: 1965  Date of evaluation: 12/18/2018      I have examined the patient and reviewed the H&P/Consult and there are no changes to the patient or plans.         Electronically signed by Ko Reilly MD on 12/18/2018 at 9:32 AM

## 2018-12-18 NOTE — ANESTHESIA POSTPROCEDURE EVALUATION
Department of Anesthesiology  Postprocedure Note    Patient: Ryan Lemus  MRN: [id number]  YOB: 1965  Date of evaluation: 12/18/2018  Time:  12:39 PM     Procedure Summary     Date:  12/18/18 Room / Location:  [facility]    Anesthesia Start:  2885 Anesthesia Stop:  1002    Procedure:  CERVICAL FACET JOINT bilateral C-facet MBB @ C2-3, C3-4, and C4-5 (Bilateral Neck) Diagnosis:  (CERVICAL RADICULITIS)    Surgeon:  Jodi Ruiz MD Responsible Provider:  Suri Hilton MD    Anesthesia Type:  MAC ASA Status:  2          Anesthesia Type: MAC    Jose Phase I:      Jose Phase II: Jose Score: 10    Last vitals: Reviewed and per EMR flowsheets. Anesthesia Post Evaluation   [address]  POST-ANESTHESIA NOTE       Name:  Ryan Lemus                                         Age:  46 y.o.   MRN:  [id number]      Last Vitals:  /63   Pulse 62   Temp 98.4 °F (36.9 °C) (Temporal)   Resp 16   Ht 5' 7\" (1.702 m)   Wt 187 lb (84.8 kg)   SpO2 98%   BMI 29.29 kg/m²   Patient Vitals for the past 4 hrs:   BP Temp Temp src Pulse Resp SpO2 Height Weight   12/18/18 1000 134/63 98.4 °F (36.9 °C) Temporal 62 16 98 % - -   12/18/18 0901 119/65 97.7 °F (36.5 °C) Temporal 67 16 98 % 5' 7\" (1.702 m) 187 lb (84.8 kg)       Level of Consciousness:  Awake    Respiratory:  Stable    Oxygen Saturation:  Stable    Cardiovascular:  Stable    Hydration:  Adequate    PONV:  Stable    Post-op Pain:  Adequate analgesia    Post-op Assessment:  No apparent anesthetic complications    Additional Follow-Up / Treatment / Comment:  None    Suri Hilton MD  December 18, 2018   12:39 PM

## 2018-12-18 NOTE — OP NOTE
Pre-Procedure Note    Patient Name: Rober Ortiz   YOB: 1965  Medical Record Number: [id number]  Date: 11/15/2018    Indication:  Neck pain    Consent: On file. Vital Signs:   Vitals:    18 0901   BP: 119/65   Pulse: 67   Resp: 16   Temp: 97.7 °F (36.5 °C)   SpO2: 98%       Past Medical History:   has a past medical history of DM2 (diabetes mellitus, type 2) ([facility] [address] Endometriosis; Esophageal stricture; Female bladder prolapse; Former smoker; History of gastric bypass; Hypotension; Iron deficiency anemia; Obesity; Pulmonary nodule; Spinal cord injury, C5-C7 ([facility] [address] Vitamin D deficiency; and Wears glasses. Past Surgical History:   has a past surgical history that includes Gastric bypass surgery (); Hysterectomy ();  section, low transverse; Cholecystectomy; Tonsillectomy; Foot surgery (3/3/15); bladder suspension ( 11); Upper gastrointestinal endoscopy ( 15); Esophagus dilation ( 15); Neck surgery (2015); other surgical history (2015); other surgical history (6/10/2016); other surgical history (Left, 2016); Leg Surgery (Left, 2017); other surgical history (10/16/2017); pr njx dx/ther sbst epidural/subarach lumbar/sacral (N/A, 10/16/2017); other surgical history (Bilateral, 2017); Nerve Block Lumb Facet Level 1 Bilateral (Bilateral, 2017); other surgical history (Left, 2018); pr inject anes/steroid foramen lumbar/sacral w img guide ,ea add level (Left, 3/12/2018); pr office/outpt visit,procedure only (N/A, 2018); and pr office/outpt visit,procedure only (N/A, 2018). Pre-Sedation Documentation and Exam:   Vital signs have been reviewed (see flow sheet for vitals).      Sedation/ Anesthesia Plan:   MAC      Patient is an appropriate candidate for plan of sedation: yes      Preoperative Diagnosis: C-spondylosis    Post-Op Dx: as above    Procedure Performed : Diagnostic / Confirmatory Median Branch Blocks at the levels of C2-3,C3-4 and C4-5 bilateral under fluoroscopic guidance# 1. Indication for the Procedure:  Patient had history of chronic neck pain that is not responding well to the conservative treatment. Patient's pain is mostly axial in nature. Pain is interfering with the activities of daily living. Examination revealed facet tenderness and facet loading is positive. Hence we decided to do confirmatory median branch blocks for possible radiofrequency abalation of median branches for long term pain releif. The procedure and risks  were discussed with the patient and an informed consent was obtained. Procedure:  Bilateral  Patient is placed in prone position and skin over the back was prepped and draped in sterile manner. Then using fluoroscopy the waist of the facet joint complex of the vertebra was observed and the view  was optimized. The skin and deep tissues over the area were infiltrated with 6 ml of 1% lidocaine. Then a #22-gauge 3-1/2   inch spinal needle was introduced through the skin wheal under fluoroscopy guidance such that the tip of the needle lies at the junction of the transverse process with the superior processes of the facet joint. Then after negative aspiration a total of 3 ml of 0.25% Marcaine and Dexamethasone 6 mg was injected through the needle in divided doses. Patient's vital signs and neurological status remained stable through  the procedure and the post procedural  period. The patient was instructed to keep track of pain for next 24 hours every hour and bring it to the next visit. Patient tolerated the procedure well and was discharged home in stable condition.     Electronically signed by Hernán Hoyos MD on 12/18/2018 at 10:01 AM

## 2018-12-18 NOTE — PROGRESS NOTES
1000 To phase 1 recovery via cart. Report received from Lois Gomez RN. Pt is awake, A & O x 3. Skin is warm and dry. Respirations are easy & non-labored. Hand grasp, pedal push and pull and bilateral leg raise are equal and strong. Pt states cervical injection site pain is 5/10. HOB elevated for comfort. 1010 Pt given warm blanket for comfort. Given coffee and apple sauce per request.   at bedside and call light in reach. 1027 Pt eating and drinking without difficulty. Reviewed discharge instructions with patient and family. Voiced understanding. 1034 Dressing at bedside with spouse assistance. Skin is warm and dry. Respirations are easy & non-labored. Pt given ice pack for home usage. 1045 To private vehicle x 1 assist via ambulation. Gait steady.

## 2018-12-18 NOTE — ANESTHESIA PRE PROCEDURE
Department of Anesthesiology  Preprocedure Note       Name:  Génesis Mistry   Age:  46 y.o.  :  1965                                          MRN:  [id number]         Date:  2018      Surgeon: Hamlet Kirk):  Yoli Monroy MD    Procedure: CERVICAL FACET JOINT bilateral C-facet MBB @ C2-3, C3-4, and C4-5 (Bilateral Neck)    Medications prior to admission:   Prior to Admission medications    Medication Sig Start Date End Date Taking? Authorizing Provider   HYDROcodone-acetaminophen (NORCO) 7.5-325 MG per tablet Take 1 tablet by mouth every 8 hours as needed for Pain for up to 30 days. . 18  Elmo Jasmin, APRN - CNP   morphine (MS CONTIN) 15 MG extended release tablet Take 1 tablet by mouth every 12 hours as needed for Pain for up to 30 days. . 18  Elmo Jasmin, APRN - CNP   gabapentin (NEURONTIN) 800 MG tablet take 1 tablet by mouth three times a day. 18  Elmo Jasmin, APRN - CNP   Elastic Bandages & Supports (KNEE BRACE) MISC Dispense 1 knee brace for left knee.  11/15/18   Nichole Champagne MD   DULoxetine (CYMBALTA) 60 MG extended release capsule take 1 capsule by mouth once daily 10/22/18   Nichole Champagne MD   mirtazapine (REMERON) 15 MG tablet take 1 tablet by mouth NIGHTLY 10/22/18   Nichole Champagne MD   amitriptyline (ELAVIL) 50 MG tablet take 1 tablet by mouth every evening 18   Nichole Champagne MD   metFORMIN (GLUCOPHAGE) 500 MG tablet take 1 tablet by mouth twice a day with meals 18   Nichole Champagne MD   Ascorbic Acid (VITAMIN C PO) Take by mouth    Historical Provider, MD   BIOTIN PO Take by mouth    Historical Provider, MD   Prenatal Vit-Fe Fumarate-FA (PRENATAL ONE DAILY PO) Take by mouth    Historical Provider, MD   Multiple Vitamins-Minerals (THERAPEUTIC MULTIVITAMIN-MINERALS) tablet Take 1 tablet by mouth daily    Historical Provider, MD   iron sucrose (VENOFER) 20 MG/ML injection Infuse 10 mLs History:        Diagnosis Date    Depression     DM2 (diabetes mellitus, type 2) ([address]     Endometriosis     resolved after hysto    Esophageal stricture     Female bladder prolapse     had sling surgery    Former smoker     History of gastric bypass 2010    Lost 130 lbs.  History of hypertension     resolved after gastric bypass    Hypotension     Iron deficiency anemia     Obesity     Pulmonary nodule     Awaiting records, pt not sure when next CT due. Hasn't grown in years.     Spinal cord injury, C5-C7 ([address]     Vitamin D deficiency     Wears glasses        Past Surgical History:        Procedure Laterality Date    BLADDER SUSPENSION  8 2 11     SECTION, LOW TRANSVERSE      x3    CHOLECYSTECTOMY      ESOPHAGEAL DILATATION  5  15    See media tab    FOOT SURGERY  3/3/15    Dr. Marian Garvey     [address]    Endometriosis    LEG SURGERY Left 2017    left thigh    NECK SURGERY  2015    ACDF C6-7    NERVE BLOCK LUMB FACET LEVEL 1 BILATERAL Bilateral 2017    LUMBAR FACET MBB L3-4, L4-5, L5-S1 BILATERAL performed by Huey Cunningham MD at [place] [address]  2015    repair scalp laceration    OTHER SURGICAL HISTORY  6/10/2016    EXCISION AND DEBRIDEMENT OF LEFT THIGH SEROMA--Dr. Dave    OTHER SURGICAL HISTORY Left 2016    drainage of left chronic thigh seroma    OTHER SURGICAL HISTORY  10/16/2017    Lumbar epidural steroid injection at L3-4    OTHER SURGICAL HISTORY Bilateral 2017    Lumbar Facet MBB at L3-4, L4-5, L5-S1    OTHER SURGICAL HISTORY Left 2018    Transforaminal Lumbar Epidural Steroid Injection at L5    RI INJECT ANES/STEROID FORAMEN LUMBAR/SACRAL W IMG GUIDE ,EA ADD LEVEL Left 3/12/2018    TFLESI @ L5 LEFT SIDE performed by Huey Cunningham MD at [address] [place] [address] DX/THER SBST EPIDURAL/SUBARACH LUMBAR/SACRAL N/A 10/16/2017    LESI @ L performed by Miguel Devlin MD at [address] OFFICE/OUTPT VISIT,PROCEDURE ONLY N/A 6/4/2018    SPINAL CORD STIMULATOR IMPLANT TRIAL performed by Miguel Devlin MD at [address] OFFICE/OUTPT VISIT,PROCEDURE ONLY N/A 9/28/2018    T8 SPINAL CORD STIMULATOR IMPLANT performed by Alireza [place] MD at [address] ENDOSCOPY  5 20 15    See media tab       Social History:    Social History   Substance Use Topics    Smoking status: Former Smoker     Packs/day: 1.00     Years: 23.00     Quit date: 1/1/2001    Smokeless tobacco: Never Used    Alcohol use No                                Counseling given: Not Answered      Vital Signs (Current): There were no vitals filed for this visit.                                            BP Readings from Last 3 Encounters:   11/15/18 128/80   10/17/18 134/85   09/29/18 (!) 99/55       NPO Status: Time of last liquid consumption: 2100                        Time of last solid consumption: 2100                        Date of last liquid consumption: 12/17/18                        Date of last solid food consumption: 12/17/18    BMI:   Wt Readings from Last 3 Encounters:   11/15/18 194 lb (88 kg)   09/28/18 189 lb 6.4 oz (85.9 kg)   09/14/18 192 lb (87.1 kg)     There is no height or weight on file to calculate BMI.    CBC:   Lab Results   Component Value Date    WBC 6.5 09/10/2018    RBC 4.44 09/10/2018    HGB 12.6 09/10/2018    HCT 40.0 09/10/2018    MCV 90.1 09/10/2018    RDW 14.6 10/22/2017     09/10/2018       CMP:   Lab Results   Component Value Date     09/10/2018    K 4.2 09/10/2018     09/10/2018    CO2 30 09/10/2018    BUN 11 09/10/2018    CREATININE 0.6 09/10/2018    LABGLOM >90 09/10/2018    GLUCOSE 83 09/10/2018    PROT 5.8 06/10/2016    CALCIUM 8.7 09/10/2018    BILITOT 0.3 06/10/2016    ALKPHOS 64 06/10/2016    AST 14 06/10/2016 ALT 11 06/10/2016       POC Tests: No results for input(s): POCGLU, POCNA, POCK, POCCL, POCBUN, POCHEMO, POCHCT in the last 72 hours. Coags:   Lab Results   Component Value Date    INR 0.87 09/10/2018    APTT 34.1 09/10/2018       HCG (If Applicable): No results found for: PREGTESTUR, PREGSERUM, HCG, HCGQUANT     ABGs: No results found for: PHART, PO2ART, SVT1SXA, OTE2YIB, BEART, B0GTSRXH     Type & Screen (If Applicable):  Lab Results   Component Value Date    LABRH POS 12/31/2015       Anesthesia Evaluation    Airway: Mallampati: II       Mouth opening: > = 3 FB Dental:          Pulmonary:                              Cardiovascular:                      Neuro/Psych:   (+) neuromuscular disease:, psychiatric history:            GI/Hepatic/Renal:             Endo/Other:    (+) Diabetes, . Abdominal:           Vascular:                                        Anesthesia Plan      MAC     ASA 2             Anesthetic plan and risks discussed with patient. Plan discussed with CRNA.                   Colin Tse MD   12/18/2018

## 2018-12-19 DIAGNOSIS — S70.02XS HEMATOMA OF LEFT HIP, SEQUELA: ICD-10-CM

## 2018-12-19 DIAGNOSIS — G62.9 NEUROPATHY: ICD-10-CM

## 2018-12-19 DIAGNOSIS — T79.2XXD TRAUMATIC SEROMA OF LEFT THIGH, SUBSEQUENT ENCOUNTER: ICD-10-CM

## 2018-12-19 DIAGNOSIS — M54.5 CHRONIC MIDLINE LOW BACK PAIN, WITH SCIATICA PRESENCE UNSPECIFIED: ICD-10-CM

## 2018-12-19 DIAGNOSIS — G89.29 CHRONIC MIDLINE LOW BACK PAIN, WITH SCIATICA PRESENCE UNSPECIFIED: ICD-10-CM

## 2018-12-19 DIAGNOSIS — M79.604 PAIN IN BOTH LOWER EXTREMITIES: ICD-10-CM

## 2018-12-19 DIAGNOSIS — M25.532 LEFT WRIST PAIN: ICD-10-CM

## 2018-12-19 DIAGNOSIS — M79.605 PAIN IN BOTH LOWER EXTREMITIES: ICD-10-CM

## 2018-12-19 DIAGNOSIS — G82.54 INCOMPLETE QUADRIPLEGIA AT C5-C8 LEVEL (HCC): ICD-10-CM

## 2018-12-19 RX ORDER — DULOXETIN HYDROCHLORIDE 60 MG/1
CAPSULE, DELAYED RELEASE ORAL
Qty: 30 CAPSULE | Refills: 1 | Status: SHIPPED | OUTPATIENT
Start: 2018-12-19 | End: 2019-02-01 | Stop reason: SDUPTHER

## 2018-12-19 RX ORDER — AMITRIPTYLINE HYDROCHLORIDE 50 MG/1
TABLET, FILM COATED ORAL
Qty: 30 TABLET | Refills: 2 | Status: SHIPPED | OUTPATIENT
Start: 2018-12-19 | End: 2019-04-23 | Stop reason: SDUPTHER

## 2019-01-15 DIAGNOSIS — G89.29 OTHER CHRONIC PAIN: ICD-10-CM

## 2019-01-15 DIAGNOSIS — G89.4 CHRONIC PAIN SYNDROME: ICD-10-CM

## 2019-01-15 RX ORDER — MORPHINE SULFATE 15 MG/1
15 TABLET, FILM COATED, EXTENDED RELEASE ORAL EVERY 12 HOURS PRN
Qty: 60 TABLET | Refills: 0 | Status: SHIPPED | OUTPATIENT
Start: 2019-01-17 | End: 2019-02-16

## 2019-01-15 RX ORDER — HYDROCODONE BITARTRATE AND ACETAMINOPHEN 7.5; 325 MG/1; MG/1
1 TABLET ORAL EVERY 8 HOURS PRN
Qty: 90 TABLET | Refills: 0 | Status: SHIPPED | OUTPATIENT
Start: 2019-01-17 | End: 2019-02-16

## 2019-01-18 RX ORDER — MIRTAZAPINE 15 MG/1
TABLET, FILM COATED ORAL
Qty: 30 TABLET | Refills: 2 | Status: SHIPPED | OUTPATIENT
Start: 2019-01-18 | End: 2019-02-01 | Stop reason: SDUPTHER

## 2019-01-28 ENCOUNTER — OFFICE VISIT (OUTPATIENT)
Dept: PHYSICAL MEDICINE AND REHAB | Age: 54
End: 2019-01-28
Payer: MEDICARE

## 2019-01-28 VITALS
DIASTOLIC BLOOD PRESSURE: 79 MMHG | SYSTOLIC BLOOD PRESSURE: 129 MMHG | BODY MASS INDEX: 30.92 KG/M2 | HEART RATE: 71 BPM | HEIGHT: 67 IN | WEIGHT: 197 LBS

## 2019-01-28 DIAGNOSIS — M54.5 CHRONIC BILATERAL LOW BACK PAIN, WITH SCIATICA PRESENCE UNSPECIFIED: ICD-10-CM

## 2019-01-28 DIAGNOSIS — M47.12 SPONDYLOSIS OF CERVICAL JOINT WITH MYELOPATHY: Primary | ICD-10-CM

## 2019-01-28 DIAGNOSIS — M47.816 SPONDYLOSIS OF LUMBAR REGION WITHOUT MYELOPATHY OR RADICULOPATHY: ICD-10-CM

## 2019-01-28 DIAGNOSIS — G82.54 INCOMPLETE QUADRIPLEGIA AT C5-C8 LEVEL (HCC): ICD-10-CM

## 2019-01-28 DIAGNOSIS — G89.4 CHRONIC PAIN SYNDROME: ICD-10-CM

## 2019-01-28 DIAGNOSIS — G89.29 CHRONIC BILATERAL LOW BACK PAIN, WITH SCIATICA PRESENCE UNSPECIFIED: ICD-10-CM

## 2019-01-28 DIAGNOSIS — M54.2 NECK PAIN: ICD-10-CM

## 2019-01-28 DIAGNOSIS — M48.062 SPINAL STENOSIS OF LUMBAR REGION WITH NEUROGENIC CLAUDICATION: ICD-10-CM

## 2019-01-28 DIAGNOSIS — M79.604 PAIN IN BOTH LOWER EXTREMITIES: ICD-10-CM

## 2019-01-28 DIAGNOSIS — M54.16 LUMBAR RADICULITIS: ICD-10-CM

## 2019-01-28 DIAGNOSIS — M79.605 PAIN IN BOTH LOWER EXTREMITIES: ICD-10-CM

## 2019-01-28 DIAGNOSIS — G62.9 NEUROPATHY: ICD-10-CM

## 2019-01-28 PROCEDURE — 99213 OFFICE O/P EST LOW 20 MIN: CPT | Performed by: NURSE PRACTITIONER

## 2019-01-28 PROCEDURE — G8484 FLU IMMUNIZE NO ADMIN: HCPCS | Performed by: NURSE PRACTITIONER

## 2019-01-28 PROCEDURE — G8427 DOCREV CUR MEDS BY ELIG CLIN: HCPCS | Performed by: NURSE PRACTITIONER

## 2019-01-28 PROCEDURE — 1036F TOBACCO NON-USER: CPT | Performed by: NURSE PRACTITIONER

## 2019-01-28 PROCEDURE — 3017F COLORECTAL CA SCREEN DOC REV: CPT | Performed by: NURSE PRACTITIONER

## 2019-01-28 PROCEDURE — G8417 CALC BMI ABV UP PARAM F/U: HCPCS | Performed by: NURSE PRACTITIONER

## 2019-01-28 RX ORDER — GABAPENTIN 800 MG/1
800 TABLET ORAL 3 TIMES DAILY
COMMUNITY
End: 2019-02-18 | Stop reason: SDUPTHER

## 2019-01-28 ASSESSMENT — ENCOUNTER SYMPTOMS: BACK PAIN: 1

## 2019-01-31 ENCOUNTER — HOSPITAL ENCOUNTER (OUTPATIENT)
Age: 54
Discharge: HOME | End: 2019-01-31
Payer: MEDICARE

## 2019-01-31 LAB — MRSA SCREEN RT-PCR: NEGATIVE

## 2019-01-31 PROCEDURE — 87641 MR-STAPH DNA AMP PROBE: CPT

## 2019-01-31 PROCEDURE — 99211 OFF/OP EST MAY X REQ PHY/QHP: CPT

## 2019-02-01 ENCOUNTER — OFFICE VISIT (OUTPATIENT)
Dept: FAMILY MEDICINE CLINIC | Age: 54
End: 2019-02-01
Payer: MEDICARE

## 2019-02-01 VITALS
WEIGHT: 185 LBS | HEIGHT: 67 IN | BODY MASS INDEX: 29.03 KG/M2 | DIASTOLIC BLOOD PRESSURE: 76 MMHG | HEART RATE: 84 BPM | SYSTOLIC BLOOD PRESSURE: 128 MMHG

## 2019-02-01 DIAGNOSIS — G89.4 CHRONIC PAIN SYNDROME: Primary | ICD-10-CM

## 2019-02-01 DIAGNOSIS — E55.9 VITAMIN D DEFICIENCY: ICD-10-CM

## 2019-02-01 DIAGNOSIS — K90.9 INTESTINAL MALABSORPTION, UNSPECIFIED TYPE: ICD-10-CM

## 2019-02-01 DIAGNOSIS — F51.01 PRIMARY INSOMNIA: ICD-10-CM

## 2019-02-01 PROCEDURE — G8427 DOCREV CUR MEDS BY ELIG CLIN: HCPCS | Performed by: FAMILY MEDICINE

## 2019-02-01 PROCEDURE — 99214 OFFICE O/P EST MOD 30 MIN: CPT | Performed by: FAMILY MEDICINE

## 2019-02-01 PROCEDURE — 3017F COLORECTAL CA SCREEN DOC REV: CPT | Performed by: FAMILY MEDICINE

## 2019-02-01 PROCEDURE — 1036F TOBACCO NON-USER: CPT | Performed by: FAMILY MEDICINE

## 2019-02-01 PROCEDURE — G8484 FLU IMMUNIZE NO ADMIN: HCPCS | Performed by: FAMILY MEDICINE

## 2019-02-01 PROCEDURE — G8417 CALC BMI ABV UP PARAM F/U: HCPCS | Performed by: FAMILY MEDICINE

## 2019-02-01 RX ORDER — DULOXETIN HYDROCHLORIDE 60 MG/1
CAPSULE, DELAYED RELEASE ORAL
Qty: 30 CAPSULE | Refills: 2 | Status: SHIPPED | OUTPATIENT
Start: 2019-02-01 | End: 2019-06-12 | Stop reason: SDUPTHER

## 2019-02-01 RX ORDER — MIRTAZAPINE 30 MG/1
TABLET, FILM COATED ORAL
Qty: 30 TABLET | Refills: 2 | Status: SHIPPED | OUTPATIENT
Start: 2019-02-01 | End: 2019-05-30 | Stop reason: SDUPTHER

## 2019-02-01 ASSESSMENT — ENCOUNTER SYMPTOMS
RHINORRHEA: 0
CHEST TIGHTNESS: 0
NAUSEA: 0
COLOR CHANGE: 0
CONSTIPATION: 0
VOMITING: 0
DIARRHEA: 0
EYE REDNESS: 0
SHORTNESS OF BREATH: 0
EYE DISCHARGE: 0

## 2019-02-11 ENCOUNTER — TELEPHONE (OUTPATIENT)
Dept: FAMILY MEDICINE CLINIC | Age: 54
End: 2019-02-11

## 2019-02-18 DIAGNOSIS — G89.29 OTHER CHRONIC PAIN: Primary | ICD-10-CM

## 2019-02-18 RX ORDER — GABAPENTIN 800 MG/1
800 TABLET ORAL 3 TIMES DAILY
Qty: 90 TABLET | Refills: 0 | Status: SHIPPED | OUTPATIENT
Start: 2019-02-18 | End: 2019-04-15 | Stop reason: SDUPTHER

## 2019-02-20 DIAGNOSIS — G89.4 CHRONIC PAIN SYNDROME: Primary | ICD-10-CM

## 2019-02-20 RX ORDER — HYDROCODONE BITARTRATE AND ACETAMINOPHEN 7.5; 325 MG/1; MG/1
1 TABLET ORAL 3 TIMES DAILY PRN
Qty: 90 TABLET | Refills: 0 | Status: SHIPPED | OUTPATIENT
Start: 2019-02-20 | End: 2019-03-18 | Stop reason: SDUPTHER

## 2019-02-20 RX ORDER — MORPHINE SULFATE 15 MG/1
15 TABLET, FILM COATED, EXTENDED RELEASE ORAL EVERY 12 HOURS
Qty: 60 TABLET | Refills: 0 | Status: SHIPPED | OUTPATIENT
Start: 2019-02-20 | End: 2019-03-18 | Stop reason: SDUPTHER

## 2019-03-08 ENCOUNTER — HOSPITAL ENCOUNTER (EMERGENCY)
Age: 54
Discharge: HOME | End: 2019-03-08
Attending: NURSE PRACTITIONER
Payer: MEDICARE

## 2019-03-08 VITALS
SYSTOLIC BLOOD PRESSURE: 103 MMHG | OXYGEN SATURATION: 95 % | RESPIRATION RATE: 16 BRPM | HEART RATE: 125 BPM | DIASTOLIC BLOOD PRESSURE: 68 MMHG | TEMPERATURE: 101.4 F

## 2019-03-08 DIAGNOSIS — J10.1 INFLUENZA A: Primary | ICD-10-CM

## 2019-03-08 LAB
FLU A ANTIGEN: POSITIVE
FLU B ANTIGEN: NEGATIVE

## 2019-03-08 PROCEDURE — 99213 OFFICE O/P EST LOW 20 MIN: CPT

## 2019-03-08 PROCEDURE — 87804 INFLUENZA ASSAY W/OPTIC: CPT

## 2019-03-08 PROCEDURE — 99213 OFFICE O/P EST LOW 20 MIN: CPT | Performed by: NURSE PRACTITIONER

## 2019-03-08 ASSESSMENT — ENCOUNTER SYMPTOMS
NAUSEA: 0
WHEEZING: 0
SINUS PRESSURE: 0
DIARRHEA: 0
COUGH: 1
VOMITING: 0
SINUS PAIN: 0
ABDOMINAL PAIN: 0
SORE THROAT: 0
SHORTNESS OF BREATH: 0

## 2019-03-08 ASSESSMENT — PAIN DESCRIPTION - PAIN TYPE: TYPE: ACUTE PAIN

## 2019-03-08 ASSESSMENT — PAIN SCALES - GENERAL: PAINLEVEL_OUTOF10: 9

## 2019-03-08 ASSESSMENT — PAIN DESCRIPTION - FREQUENCY: FREQUENCY: CONTINUOUS

## 2019-03-08 ASSESSMENT — PAIN DESCRIPTION - DESCRIPTORS: DESCRIPTORS: ACHING

## 2019-03-08 ASSESSMENT — PAIN DESCRIPTION - LOCATION: LOCATION: OTHER (COMMENT)

## 2019-03-18 DIAGNOSIS — G89.4 CHRONIC PAIN SYNDROME: ICD-10-CM

## 2019-03-18 RX ORDER — HYDROCODONE BITARTRATE AND ACETAMINOPHEN 7.5; 325 MG/1; MG/1
1 TABLET ORAL 3 TIMES DAILY PRN
Qty: 90 TABLET | Refills: 0 | Status: SHIPPED | OUTPATIENT
Start: 2019-03-22 | End: 2019-04-16 | Stop reason: SDUPTHER

## 2019-03-18 RX ORDER — MORPHINE SULFATE 15 MG/1
15 TABLET, FILM COATED, EXTENDED RELEASE ORAL EVERY 12 HOURS
Qty: 60 TABLET | Refills: 0 | Status: SHIPPED | OUTPATIENT
Start: 2019-03-22 | End: 2019-04-16 | Stop reason: SDUPTHER

## 2019-04-15 DIAGNOSIS — G89.29 OTHER CHRONIC PAIN: ICD-10-CM

## 2019-04-15 RX ORDER — GABAPENTIN 800 MG/1
800 TABLET ORAL 3 TIMES DAILY
Qty: 90 TABLET | Refills: 0 | Status: SHIPPED | OUTPATIENT
Start: 2019-04-15 | End: 2019-05-09 | Stop reason: SDUPTHER

## 2019-04-16 DIAGNOSIS — G89.4 CHRONIC PAIN SYNDROME: ICD-10-CM

## 2019-04-16 DIAGNOSIS — G89.29 OTHER CHRONIC PAIN: ICD-10-CM

## 2019-04-16 RX ORDER — HYDROCODONE BITARTRATE AND ACETAMINOPHEN 7.5; 325 MG/1; MG/1
1 TABLET ORAL 3 TIMES DAILY PRN
Qty: 90 TABLET | Refills: 0 | Status: SHIPPED | OUTPATIENT
Start: 2019-04-21 | End: 2019-05-20 | Stop reason: SDUPTHER

## 2019-04-16 RX ORDER — GABAPENTIN 800 MG/1
800 TABLET ORAL 3 TIMES DAILY
Qty: 90 TABLET | Refills: 0 | Status: CANCELLED | OUTPATIENT
Start: 2019-04-16 | End: 2019-05-16

## 2019-04-16 RX ORDER — MORPHINE SULFATE 15 MG/1
15 TABLET, FILM COATED, EXTENDED RELEASE ORAL EVERY 12 HOURS
Qty: 60 TABLET | Refills: 0 | Status: SHIPPED | OUTPATIENT
Start: 2019-04-21 | End: 2019-05-20 | Stop reason: SDUPTHER

## 2019-04-16 NOTE — TELEPHONE ENCOUNTER
Ej Aviles called requesting a refill on the following medications:  Requested Prescriptions     Pending Prescriptions Disp Refills    HYDROcodone-acetaminophen (NORCO) 7.5-325 MG per tablet 90 tablet 0     Sig: Take 1 tablet by mouth 3 times daily as needed for Pain for up to 30 days.  morphine (MS CONTIN) 15 MG extended release tablet 60 tablet 0     Sig: Take 1 tablet by mouth every 12 hours for 30 days.  gabapentin (NEURONTIN) 800 MG tablet 90 tablet 0     Sig: Take 1 tablet by mouth 3 times daily for 30 days.      Pharmacy verified:  .pv    [facility] [address]     Date of last visit: 1/28/19  Date of next visit (if applicable): 1/22/1704

## 2019-04-16 NOTE — TELEPHONE ENCOUNTER
OARRS reviewed. UDS: + for  Gabapentin  Morphine  Norco (Hydrocodone)- Consistent. Narcan offered: Yes  Last seen: 1/28/2019.  Follow-up:   Future Appointments   Date Time Provider Kaleb Camargo   4/29/2019  8:10 AM FADUMO Pineda - CNP [facility] - [address] [facility]

## 2019-04-23 DIAGNOSIS — S70.02XS HEMATOMA OF LEFT HIP, SEQUELA: ICD-10-CM

## 2019-04-23 DIAGNOSIS — M25.532 LEFT WRIST PAIN: ICD-10-CM

## 2019-04-23 DIAGNOSIS — G62.9 NEUROPATHY: ICD-10-CM

## 2019-04-23 DIAGNOSIS — T79.2XXD TRAUMATIC SEROMA OF LEFT THIGH, SUBSEQUENT ENCOUNTER: ICD-10-CM

## 2019-04-23 DIAGNOSIS — G89.29 CHRONIC MIDLINE LOW BACK PAIN, WITH SCIATICA PRESENCE UNSPECIFIED: ICD-10-CM

## 2019-04-23 DIAGNOSIS — G82.54 INCOMPLETE QUADRIPLEGIA AT C5-C8 LEVEL (HCC): ICD-10-CM

## 2019-04-23 DIAGNOSIS — M54.5 CHRONIC MIDLINE LOW BACK PAIN, WITH SCIATICA PRESENCE UNSPECIFIED: ICD-10-CM

## 2019-04-23 DIAGNOSIS — M79.605 PAIN IN BOTH LOWER EXTREMITIES: ICD-10-CM

## 2019-04-23 DIAGNOSIS — M79.604 PAIN IN BOTH LOWER EXTREMITIES: ICD-10-CM

## 2019-04-23 RX ORDER — AMITRIPTYLINE HYDROCHLORIDE 50 MG/1
TABLET, FILM COATED ORAL
Qty: 30 TABLET | Refills: 2 | Status: SHIPPED | OUTPATIENT
Start: 2019-04-23 | End: 2019-05-30 | Stop reason: SDUPTHER

## 2019-04-23 RX ORDER — MIRTAZAPINE 15 MG/1
TABLET, FILM COATED ORAL
Qty: 30 TABLET | Refills: 2 | Status: SHIPPED | OUTPATIENT
Start: 2019-04-23 | End: 2019-05-30 | Stop reason: ALTCHOICE

## 2019-05-09 DIAGNOSIS — G89.29 OTHER CHRONIC PAIN: ICD-10-CM

## 2019-05-09 RX ORDER — GABAPENTIN 800 MG/1
800 TABLET ORAL 3 TIMES DAILY
Qty: 90 TABLET | Refills: 0 | Status: SHIPPED | OUTPATIENT
Start: 2019-05-15 | End: 2019-05-20 | Stop reason: SDUPTHER

## 2019-05-09 NOTE — TELEPHONE ENCOUNTER
OARRS reviewed. UDS: + for  Gabapentin POSITIVE CONSISTENT  Morphine POSITIVE CONSISTENT  Hydromorphone POSITIVE CONSISTENT  Hydrocodone POSITIVE CONSISTENT  Norhydrocodone POSITIVE CONSISTENT. Narcan offered: yes  Last seen: 1/28/2019. Follow-up: No future appointments.

## 2019-05-20 DIAGNOSIS — G89.29 OTHER CHRONIC PAIN: ICD-10-CM

## 2019-05-20 DIAGNOSIS — G89.4 CHRONIC PAIN SYNDROME: ICD-10-CM

## 2019-05-20 RX ORDER — GABAPENTIN 800 MG/1
800 TABLET ORAL 3 TIMES DAILY
Qty: 90 TABLET | Refills: 0 | Status: SHIPPED | OUTPATIENT
Start: 2019-05-20 | End: 2019-06-11 | Stop reason: SDUPTHER

## 2019-05-20 RX ORDER — MORPHINE SULFATE 15 MG/1
15 TABLET, FILM COATED, EXTENDED RELEASE ORAL EVERY 12 HOURS
Qty: 60 TABLET | Refills: 0 | Status: SHIPPED | OUTPATIENT
Start: 2019-05-22 | End: 2019-06-11 | Stop reason: SDUPTHER

## 2019-05-20 RX ORDER — HYDROCODONE BITARTRATE AND ACETAMINOPHEN 7.5; 325 MG/1; MG/1
1 TABLET ORAL 3 TIMES DAILY PRN
Qty: 90 TABLET | Refills: 0 | Status: SHIPPED | OUTPATIENT
Start: 2019-05-22 | End: 2019-06-11 | Stop reason: SDUPTHER

## 2019-05-20 NOTE — TELEPHONE ENCOUNTER
OARRS reviewed. UDS: + for  Gabapentin  Morphine  Norco (Hydrocodone)- consistent. Narcan offered: Yes  Last seen: 1/28/2019.  Follow-up:   Future Appointments   Date Time Provider Kaleb Camargo   5/30/2019 10:45 AM MD SETH MoreX DELPHOS [facility] - [place]   6/11/2019  7:50 AM FADUMO Geiger - [facility]NP [facility]

## 2019-05-20 NOTE — TELEPHONE ENCOUNTER
Jai Johns called requesting a refill on the following medications:  Requested Prescriptions     Pending Prescriptions Disp Refills    gabapentin (NEURONTIN) 800 MG tablet 90 tablet 0     Sig: Take 1 tablet by mouth 3 times daily for 30 days.  HYDROcodone-acetaminophen (NORCO) 7.5-325 MG per tablet 90 tablet 0     Sig: Take 1 tablet by mouth 3 times daily as needed for Pain for up to 30 days.  morphine (MS CONTIN) 15 MG extended release tablet 60 tablet 0     Sig: Take 1 tablet by mouth every 12 hours for 30 days.      Pharmacy verified:  .pv    [facility] [place]    Date of last visit: 01/28/19  Date of next visit (if applicable): 0/88/9810

## 2019-05-30 ENCOUNTER — OFFICE VISIT (OUTPATIENT)
Dept: FAMILY MEDICINE CLINIC | Age: 54
End: 2019-05-30
Payer: MEDICARE

## 2019-05-30 VITALS
WEIGHT: 184 LBS | DIASTOLIC BLOOD PRESSURE: 82 MMHG | BODY MASS INDEX: 28.88 KG/M2 | HEART RATE: 76 BPM | SYSTOLIC BLOOD PRESSURE: 104 MMHG | HEIGHT: 67 IN

## 2019-05-30 DIAGNOSIS — F33.1 MAJOR DEPRESSIVE DISORDER, RECURRENT, MODERATE (HCC): ICD-10-CM

## 2019-05-30 DIAGNOSIS — G62.9 NEUROPATHY: ICD-10-CM

## 2019-05-30 DIAGNOSIS — F51.01 PRIMARY INSOMNIA: ICD-10-CM

## 2019-05-30 DIAGNOSIS — M79.605 PAIN IN BOTH LOWER EXTREMITIES: ICD-10-CM

## 2019-05-30 DIAGNOSIS — M79.604 PAIN IN BOTH LOWER EXTREMITIES: ICD-10-CM

## 2019-05-30 DIAGNOSIS — E11.9 TYPE 2 DIABETES MELLITUS WITHOUT COMPLICATION, WITHOUT LONG-TERM CURRENT USE OF INSULIN (HCC): Primary | ICD-10-CM

## 2019-05-30 DIAGNOSIS — M54.5 CHRONIC MIDLINE LOW BACK PAIN, WITH SCIATICA PRESENCE UNSPECIFIED: ICD-10-CM

## 2019-05-30 DIAGNOSIS — R21 RASH AND NONSPECIFIC SKIN ERUPTION: ICD-10-CM

## 2019-05-30 DIAGNOSIS — G89.29 CHRONIC MIDLINE LOW BACK PAIN, WITH SCIATICA PRESENCE UNSPECIFIED: ICD-10-CM

## 2019-05-30 LAB — HBA1C MFR BLD: 6.9 %

## 2019-05-30 PROCEDURE — G8427 DOCREV CUR MEDS BY ELIG CLIN: HCPCS | Performed by: FAMILY MEDICINE

## 2019-05-30 PROCEDURE — 2022F DILAT RTA XM EVC RTNOPTHY: CPT | Performed by: FAMILY MEDICINE

## 2019-05-30 PROCEDURE — 1036F TOBACCO NON-USER: CPT | Performed by: FAMILY MEDICINE

## 2019-05-30 PROCEDURE — 83036 HEMOGLOBIN GLYCOSYLATED A1C: CPT | Performed by: FAMILY MEDICINE

## 2019-05-30 PROCEDURE — G8417 CALC BMI ABV UP PARAM F/U: HCPCS | Performed by: FAMILY MEDICINE

## 2019-05-30 PROCEDURE — 99214 OFFICE O/P EST MOD 30 MIN: CPT | Performed by: FAMILY MEDICINE

## 2019-05-30 PROCEDURE — 82044 UR ALBUMIN SEMIQUANTITATIVE: CPT | Performed by: FAMILY MEDICINE

## 2019-05-30 PROCEDURE — 3044F HG A1C LEVEL LT 7.0%: CPT | Performed by: FAMILY MEDICINE

## 2019-05-30 PROCEDURE — 3017F COLORECTAL CA SCREEN DOC REV: CPT | Performed by: FAMILY MEDICINE

## 2019-05-30 RX ORDER — TRIAMCINOLONE ACETONIDE 1 MG/G
CREAM TOPICAL
Qty: 30 G | Refills: 0 | Status: SHIPPED | OUTPATIENT
Start: 2019-05-30

## 2019-05-30 RX ORDER — MIRTAZAPINE 15 MG/1
TABLET, FILM COATED ORAL
Qty: 90 TABLET | Refills: 1 | Status: CANCELLED | OUTPATIENT
Start: 2019-05-30

## 2019-05-30 RX ORDER — BLOOD-GLUCOSE METER
1 KIT MISCELLANEOUS DAILY
Qty: 1 KIT | Refills: 0 | Status: SHIPPED | OUTPATIENT
Start: 2019-05-30

## 2019-05-30 RX ORDER — MIRTAZAPINE 30 MG/1
TABLET, FILM COATED ORAL
Qty: 30 TABLET | Refills: 2 | Status: SHIPPED | OUTPATIENT
Start: 2019-05-30 | End: 2020-07-22

## 2019-05-30 RX ORDER — AMITRIPTYLINE HYDROCHLORIDE 50 MG/1
TABLET, FILM COATED ORAL
Qty: 90 TABLET | Refills: 1 | Status: SHIPPED | OUTPATIENT
Start: 2019-05-30 | End: 2020-07-22

## 2019-05-30 ASSESSMENT — ENCOUNTER SYMPTOMS
COUGH: 0
SHORTNESS OF BREATH: 0
EYE DISCHARGE: 0
EYE REDNESS: 0
COLOR CHANGE: 1
RHINORRHEA: 0
NAUSEA: 0
BACK PAIN: 1
VOMITING: 0

## 2019-05-30 NOTE — PROGRESS NOTES
[address] [place] [address] [address], [address], [place]  Phone:  [phone]  F:[phone]       Name: Amber Harrell  : 1965    Chief Complaint   Patient presents with    6 Month Follow-Up     rash on the right forearm    Diabetes       HPI:     DIPAK Harrell is a 46 y.o. female who presents today for follow-up of T2DM and insomnia and for evaluation of a rash on her right forearm. She's been under a lot of stress recently as she's in the process of moving. Her diffuse pain has also been worse with all of the recent rain. Diabetes  Her last HbA1c was 6.7% in 2018. She is on Metformin 500 mg BID and does not check her BGs at home. She wonders if she's getting occasional lows as she feels shaky/jittery. She'd like an Rx for a glucometer and test strips so she can check her BGs when feeling poorly. Insomnia  She is sleeping better since increasing her Remeron dose from 15 mg to 30 mg nightly. Rash  She developed a red bumpy rash on her right arm a few days ago. It is not painful but does itch. She denies the use of any new lotions, soaps, detergents, etc.  No plant exposure. Current Outpatient Medications:     amitriptyline (ELAVIL) 50 MG tablet, take 1 tablet by mouth at bedtime, Disp: 90 tablet, Rfl: 1    metFORMIN (GLUCOPHAGE) 500 MG tablet, take 1 tablet by mouth twice a day with meals, Disp: 180 tablet, Rfl: 1    mirtazapine (REMERON) 30 MG tablet, take 1 tablet by mouth at bedtime, Disp: 30 tablet, Rfl: 2    triamcinolone (KENALOG) 0.1 % cream, Apply topically 2 times daily. , Disp: 30 g, Rfl: 0    glucose monitoring kit (FREESTYLE) monitoring kit, 1 kit by Does not apply route daily, Disp: 1 kit, Rfl: 0    blood glucose test strips (ASCENSIA AUTODISC VI;ONE TOUCH ULTRA TEST VI) strip, Use with associated glucose meter to test up to once daily. , Disp: 100 strip, Rfl: 11    gabapentin (NEURONTIN) 800 MG tablet, Take 1 tablet by mouth 3 times daily for 30 days. , Disp: 90 tablet, Rfl: 0    HYDROcodone-acetaminophen (NORCO) 7.5-325 MG per tablet, Take 1 tablet by mouth 3 times daily as needed for Pain for up to 30 days. , Disp: 90 tablet, Rfl: 0    morphine (MS CONTIN) 15 MG extended release tablet, Take 1 tablet by mouth every 12 hours for 30 days. , Disp: 60 tablet, Rfl: 0    DULoxetine (CYMBALTA) 60 MG extended release capsule, take 1 capsule by mouth once daily, Disp: 30 capsule, Rfl: 2    Ascorbic Acid (VITAMIN C PO), Take by mouth, Disp: , Rfl:     BIOTIN PO, Take by mouth, Disp: , Rfl:     Prenatal Vit-Fe Fumarate-FA (PRENATAL ONE DAILY PO), Take by mouth, Disp: , Rfl:     Multiple Vitamins-Minerals (THERAPEUTIC MULTIVITAMIN-MINERALS) tablet, Take 1 tablet by mouth daily, Disp: , Rfl:     iron sucrose (VENOFER) 20 MG/ML injection, Infuse 10 mLs intravenously every 3 days for 5 doses Infuse slowly over at least 5 minutes. , Disp: 50 mL, Rfl: 0    vitamin D (ERGOCALCIFEROL) 98401 units CAPS capsule, Take 1 capsule by mouth once a week for 12 doses, Disp: 12 capsule, Rfl: 0    docusate sodium (COLACE) 100 MG capsule, Take 100 mg by mouth as needed for Constipation, Disp: , Rfl:     vitamin B-12 (CYANOCOBALAMIN) 500 MCG tablet, Take 500 mcg by mouth daily, Disp: , Rfl:     Allergies   Allergen Reactions    Ibuprofen Other (See Comments)     Unable to take due to gastric bypass       Subjective:      Review of Systems   Constitutional: Negative for chills and fever. HENT: Negative for congestion and rhinorrhea. Eyes: Negative for discharge and redness. Respiratory: Negative for cough and shortness of breath. Cardiovascular: Negative for chest pain and palpitations. Gastrointestinal: Negative for nausea and vomiting. Musculoskeletal: Positive for arthralgias, back pain, myalgias and neck pain. Skin: Positive for color change and rash. Allergic/Immunologic: Negative for environmental allergies and food allergies. Psychiatric/Behavioral: Positive for sleep disturbance. Negative for behavioral problems. Objective:     /82 (Site: Left Upper Arm, Position: Sitting, Cuff Size: Large Adult)   Pulse 76   Ht 5' 7\" (1.702 m)   Wt 184 lb (83.5 kg)   BMI 28.82 kg/m²     Physical Exam   Constitutional: She is oriented to person, place, and time. She appears well-developed and well-nourished. No distress. HENT:   Head: Normocephalic and atraumatic. Nose: Nose normal.   Eyes: Conjunctivae and EOM are normal.   Neck: Normal range of motion. Neck supple. Cardiovascular: Normal rate and regular rhythm. Pulmonary/Chest: Effort normal and breath sounds normal. No respiratory distress. She has no wheezes. Abdominal: Soft. Bowel sounds are normal.   Neurological: She is alert and oriented to person, place, and time. Skin: Skin is warm and dry. Erythematous papular rash right antecubital fossa. Nursing note and vitals reviewed. Assessment/Plan:     Huy Cortes was seen today for 6 month follow-up and diabetes. Diagnoses and all orders for this visit:    Type 2 diabetes mellitus without complication, without long-term current use of insulin ([facility])  -     HbA1c is well-controlled at 6.9% so will continue on current medication. A healthy low-carb diet and routine physical activity encouraged. -     POCT glycosylated hemoglobin (Hb A1C)  -     POCT microalbumin  -     metFORMIN (GLUCOPHAGE) 500 MG tablet; take 1 tablet by mouth twice a day with meals  -     glucose monitoring kit (FREESTYLE) monitoring kit; 1 kit by Does not apply route daily  -     blood glucose test strips (ASCENSIA AUTODISC VI;ONE TOUCH ULTRA TEST VI) strip; Use with associated glucose meter to test up to once daily.     Neuropathy  -     amitriptyline (ELAVIL) 50 MG tablet; take 1 tablet by mouth at bedtime    Primary insomnia  -     Symptoms have improved on the 30 mg dose so will continue with it.  -     mirtazapine (REMERON) 30 MG tablet; take 1 tablet by mouth at bedtime    Rash and nonspecific skin eruption  -     Rash is nonspecific. Will treat with topical Kenalog cream.  -     triamcinolone (KENALOG) 0.1 % cream; Apply topically 2 times daily. Pain in both lower extremities/Chronic midline low back pain, with sciatica presence unspecified  -     amitriptyline (ELAVIL) 50 MG tablet; take 1 tablet by mouth at bedtime      Return in about 6 months (around 11/30/2019) for diabetes.     Electronically signed by Konrad Ziegler MD on 5/30/2019 at 1:36 PM

## 2019-06-11 ENCOUNTER — OFFICE VISIT (OUTPATIENT)
Dept: PHYSICAL MEDICINE AND REHAB | Age: 54
End: 2019-06-11
Payer: MEDICARE

## 2019-06-11 VITALS
HEIGHT: 67 IN | DIASTOLIC BLOOD PRESSURE: 69 MMHG | WEIGHT: 184.08 LBS | BODY MASS INDEX: 28.89 KG/M2 | HEART RATE: 77 BPM | SYSTOLIC BLOOD PRESSURE: 118 MMHG

## 2019-06-11 DIAGNOSIS — M47.12 SPONDYLOSIS OF CERVICAL JOINT WITH MYELOPATHY: Primary | ICD-10-CM

## 2019-06-11 DIAGNOSIS — M79.604 PAIN IN BOTH LOWER EXTREMITIES: ICD-10-CM

## 2019-06-11 DIAGNOSIS — G89.29 CHRONIC BILATERAL LOW BACK PAIN, WITH SCIATICA PRESENCE UNSPECIFIED: ICD-10-CM

## 2019-06-11 DIAGNOSIS — M54.5 CHRONIC BILATERAL LOW BACK PAIN, WITH SCIATICA PRESENCE UNSPECIFIED: ICD-10-CM

## 2019-06-11 DIAGNOSIS — M79.605 PAIN IN BOTH LOWER EXTREMITIES: ICD-10-CM

## 2019-06-11 DIAGNOSIS — R53.81 DEBILITY: ICD-10-CM

## 2019-06-11 DIAGNOSIS — G89.29 OTHER CHRONIC PAIN: ICD-10-CM

## 2019-06-11 DIAGNOSIS — G62.9 NEUROPATHY: ICD-10-CM

## 2019-06-11 DIAGNOSIS — M48.062 SPINAL STENOSIS OF LUMBAR REGION WITH NEUROGENIC CLAUDICATION: ICD-10-CM

## 2019-06-11 DIAGNOSIS — M54.16 LUMBAR RADICULITIS: ICD-10-CM

## 2019-06-11 DIAGNOSIS — G89.4 CHRONIC PAIN SYNDROME: ICD-10-CM

## 2019-06-11 PROCEDURE — 3017F COLORECTAL CA SCREEN DOC REV: CPT | Performed by: NURSE PRACTITIONER

## 2019-06-11 PROCEDURE — G8417 CALC BMI ABV UP PARAM F/U: HCPCS | Performed by: NURSE PRACTITIONER

## 2019-06-11 PROCEDURE — 1036F TOBACCO NON-USER: CPT | Performed by: NURSE PRACTITIONER

## 2019-06-11 PROCEDURE — 99213 OFFICE O/P EST LOW 20 MIN: CPT | Performed by: NURSE PRACTITIONER

## 2019-06-11 PROCEDURE — G8427 DOCREV CUR MEDS BY ELIG CLIN: HCPCS | Performed by: NURSE PRACTITIONER

## 2019-06-11 RX ORDER — DULOXETIN HYDROCHLORIDE 60 MG/1
CAPSULE, DELAYED RELEASE ORAL
Qty: 30 CAPSULE | Refills: 1 | Status: SHIPPED | OUTPATIENT
Start: 2019-06-11

## 2019-06-11 RX ORDER — NALOXONE HYDROCHLORIDE 4 MG/.1ML
1 SPRAY NASAL PRN
Qty: 1 EACH | Refills: 0 | Status: SHIPPED | OUTPATIENT
Start: 2019-06-11 | End: 2021-06-21

## 2019-06-11 RX ORDER — MORPHINE SULFATE 15 MG/1
15 TABLET, FILM COATED, EXTENDED RELEASE ORAL EVERY 12 HOURS
Qty: 60 TABLET | Refills: 0 | Status: SHIPPED | OUTPATIENT
Start: 2019-06-21 | End: 2019-07-23 | Stop reason: SDUPTHER

## 2019-06-11 RX ORDER — HYDROCODONE BITARTRATE AND ACETAMINOPHEN 7.5; 325 MG/1; MG/1
1 TABLET ORAL 3 TIMES DAILY PRN
Qty: 90 TABLET | Refills: 0 | Status: SHIPPED | OUTPATIENT
Start: 2019-06-21 | End: 2019-07-23 | Stop reason: SDUPTHER

## 2019-06-11 RX ORDER — GABAPENTIN 800 MG/1
800 TABLET ORAL 3 TIMES DAILY
Qty: 90 TABLET | Refills: 0 | Status: SHIPPED | OUTPATIENT
Start: 2019-06-19 | End: 2019-07-23 | Stop reason: SDUPTHER

## 2019-06-11 ASSESSMENT — ENCOUNTER SYMPTOMS: BACK PAIN: 1

## 2019-06-11 NOTE — PROGRESS NOTES
at its worst is 8/10. Pain scale with pain medications or at its best is 4-5/10. Last dose of MS ER and Perocet was today  Drug screen reviewed from 2019 and was appropriate  Pill count was completed today and was appropriate  Patient does have naloxone available at home. Patient has not required use of naloxone at home since last office visit. The patientis allergic to ibuprofen. Past Medical History  [place]  has a past medical history of DM2 (diabetes mellitus, type 2) ([address] Endometriosis, Esophageal stricture, Female bladder prolapse, Former smoker, History of gastric bypass, Hypotension, Iron deficiency anemia, Obesity, Pulmonary nodule, Spinal cord injury, C5-C7 ([address] Vitamin D deficiency, and Wears glasses. Past Surgical History  The patient  has a past surgical history that includes Gastric bypass surgery (); Hysterectomy ();  section, low transverse; Cholecystectomy; Tonsillectomy; Foot surgery (3/3/15); bladder suspension (8 2 11); Upper gastrointestinal endoscopy ( 20 15); Esophagus dilation ( 20 15); Neck surgery (2015); other surgical history (2015); other surgical history (6/10/2016); other surgical history (Left, 2016); Leg Surgery (Left, 2017); other surgical history (10/16/2017); pr njx dx/ther sbst epidural/subarach lumbar/sacral (N/A, 10/16/2017); other surgical history (Bilateral, 2017); Nerve Block Lumb Facet Level 1 Bilateral (Bilateral, 2017); other surgical history (Left, 2018); pr inject anes/steroid foramen lumbar/sacral w img guide ,ea add level (Left, 3/12/2018); pr office/outpt visit,procedure only (N/A, 2018); pr office/outpt visit,procedure only (N/A, 2018); and Facet jt inj cervical (Bilateral, 2018). Family History  This patient's family history includes Cancer in her brother and father; Diabetes in her mother; Heart Disease in her mother; High Blood Pressure in her father.     Social History  Gustabo Olszewski  reports that she quit smoking about 18 years ago. She has a 23.00 pack-year smoking history. She has never used smokeless tobacco. She reports that she does not drink alcohol or use drugs. Medications    Current Outpatient Medications:     [START ON 6/21/2019] HYDROcodone-acetaminophen (NORCO) 7.5-325 MG per tablet, Take 1 tablet by mouth 3 times daily as needed for Pain for up to 30 days. , Disp: 90 tablet, Rfl: 0    [START ON 6/21/2019] morphine (MS CONTIN) 15 MG extended release tablet, Take 1 tablet by mouth every 12 hours for 30 days. , Disp: 60 tablet, Rfl: 0    naloxone 4 MG/0.1ML LIQD nasal spray, 1 spray by Nasal route as needed for Opioid Reversal, Disp: 1 each, Rfl: 0    [START ON 6/19/2019] gabapentin (NEURONTIN) 800 MG tablet, Take 1 tablet by mouth 3 times daily for 30 days. , Disp: 90 tablet, Rfl: 0    amitriptyline (ELAVIL) 50 MG tablet, take 1 tablet by mouth at bedtime, Disp: 90 tablet, Rfl: 1    metFORMIN (GLUCOPHAGE) 500 MG tablet, take 1 tablet by mouth twice a day with meals, Disp: 180 tablet, Rfl: 1    mirtazapine (REMERON) 30 MG tablet, take 1 tablet by mouth at bedtime, Disp: 30 tablet, Rfl: 2    triamcinolone (KENALOG) 0.1 % cream, Apply topically 2 times daily. , Disp: 30 g, Rfl: 0    glucose monitoring kit (FREESTYLE) monitoring kit, 1 kit by Does not apply route daily, Disp: 1 kit, Rfl: 0    blood glucose test strips (ASCENSIA AUTODISC VI;ONE TOUCH ULTRA TEST VI) strip, Use with associated glucose meter to test up to once daily. , Disp: 100 strip, Rfl: 11    DULoxetine (CYMBALTA) 60 MG extended release capsule, take 1 capsule by mouth once daily, Disp: 30 capsule, Rfl: 2    Ascorbic Acid (VITAMIN C PO), Take by mouth, Disp: , Rfl:     BIOTIN PO, Take by mouth, Disp: , Rfl:     Prenatal Vit-Fe Fumarate-FA (PRENATAL ONE DAILY PO), Take by mouth, Disp: , Rfl:     Multiple Vitamins-Minerals (THERAPEUTIC MULTIVITAMIN-MINERALS) tablet, Take 1 tablet by mouth daily, Disp: , Rfl:     docusate sodium (COLACE) 100 MG capsule, Take 100 mg by mouth as needed for Constipation, Disp: , Rfl:     vitamin B-12 (CYANOCOBALAMIN) 500 MCG tablet, Take 500 mcg by mouth daily, Disp: , Rfl:     iron sucrose (VENOFER) 20 MG/ML injection, Infuse 10 mLs intravenously every 3 days for 5 doses Infuse slowly over at least 5 minutes. , Disp: 50 mL, Rfl: 0    vitamin D (ERGOCALCIFEROL) 60638 units CAPS capsule, Take 1 capsule by mouth once a week for 12 doses, Disp: 12 capsule, Rfl: 0    Subjective:      Review of Systems   Musculoskeletal: Positive for arthralgias, back pain, gait problem, myalgias, neck pain and neck stiffness. Neurological: Positive for weakness and numbness. Objective:     Vitals:    06/11/19 0746   BP: 118/69   Site: Right Upper Arm   Position: Sitting   Cuff Size: Medium Adult   Pulse: 77   Weight: 184 lb 1.4 oz (83.5 kg)   Height: 5' 7.01\" (1.702 m)       Physical Exam   Constitutional: She is oriented to person, place, and time. She appears well-developed and well-nourished. No distress. HENT:   Head: Normocephalic and atraumatic. Right Ear: External ear normal.   Left Ear: External ear normal.   Nose: Nose normal.   Mouth/Throat: Oropharynx is clear and moist. No oropharyngeal exudate. Eyes: Pupils are equal, round, and reactive to light. Conjunctivae and EOM are normal. Right eye exhibits no discharge. Left eye exhibits no discharge. No scleral icterus. Neck: Normal range of motion and full passive range of motion without pain. Neck supple. No muscular tenderness present. No neck rigidity. No edema, no erythema and normal range of motion present. No thyromegaly present. Cardiovascular: Normal rate, regular rhythm, normal heart sounds and intact distal pulses. Exam reveals no gallop and no friction rub. No murmur heard. Pulmonary/Chest: Effort normal and breath sounds normal. No respiratory distress. She has no wheezes. She has no rales.  She exhibits no tenderness. Abdominal: Soft. Bowel sounds are normal. She exhibits no distension. There is no tenderness. There is no rebound and no guarding. Musculoskeletal: She exhibits tenderness. Right shoulder: She exhibits decreased range of motion and tenderness. Left shoulder: She exhibits decreased range of motion and tenderness. Right elbow: Tenderness found. Left elbow: Tenderness found. Right wrist: She exhibits tenderness. Left wrist: She exhibits tenderness. Right hip: She exhibits tenderness. Left hip: She exhibits tenderness. Right knee: Tenderness found. Left knee: She exhibits decreased range of motion and swelling. Tenderness found. Right ankle: She exhibits swelling. Tenderness. Left ankle: She exhibits decreased range of motion and swelling. Tenderness. Cervical back: She exhibits decreased range of motion, tenderness, pain and spasm. Thoracic back: She exhibits tenderness. Lumbar back: She exhibits tenderness, pain and spasm. Back:         Right upper arm: She exhibits tenderness. Left upper arm: She exhibits tenderness. Right forearm: She exhibits tenderness. Left forearm: She exhibits tenderness. Right hand: She exhibits tenderness. Decreased strength noted. Left hand: She exhibits tenderness. Decreased strength noted. Right upper leg: She exhibits tenderness. Left upper leg: She exhibits tenderness. Right lower leg: She exhibits tenderness and swelling. Left lower leg: She exhibits tenderness and swelling. Right foot: There is tenderness and swelling. Left foot: There is decreased range of motion, tenderness and swelling. Neurological: She is alert and oriented to person, place, and time. She has normal strength. She is not disoriented. She displays no atrophy. No cranial nerve deficit or sensory deficit.  She exhibits normal muscle tone. She displays a negative Romberg sign. Coordination and gait normal. She displays no Babinski's sign on the right side. Reflex Scores:       Tricep reflexes are 1+ on the right side and 1+ on the left side. Bicep reflexes are 1+ on the right side and 1+ on the left side. Brachioradialis reflexes are 1+ on the right side and 1+ on the left side. Patellar reflexes are 2+ on the right side and 2+ on the left side. Achilles reflexes are 2+ on the right side and 2+ on the left side. SLR -  Motor 3/5 LUE LLE RUE 4/5 RLE 4/5   Skin: Skin is warm. No rash noted. She is not diaphoretic. No erythema. No pallor. Psychiatric: She has a normal mood and affect. Her speech is normal and behavior is normal. Judgment and thought content normal. Her mood appears not anxious. Her affect is not angry, not blunt, not labile and not inappropriate. She is not agitated, not aggressive, not hyperactive, not slowed, not withdrawn, not actively hallucinating and not combative. Thought content is not paranoid and not delusional. Cognition and memory are normal. Cognition and memory are not impaired. She does not express impulsivity or inappropriate judgment. She does not exhibit a depressed mood. She expresses no homicidal and no suicidal ideation. She expresses no suicidal plans and no homicidal plans. She exhibits normal recent memory and normal remote memory. She is attentive. Nursing note and vitals reviewed. DALTON test: negative   Yeomans test: negative   Gaenslen test: negative      Assessment:     1. Spondylosis of cervical joint with myelopathy    2. Lumbar radiculitis    3. Spinal stenosis of lumbar region with neurogenic claudication    4. Chronic bilateral low back pain, with sciatica presence unspecified    5. Neuropathy    6. Pain in both lower extremities    7. Chronic pain syndrome    8. Other chronic pain            Plan:      · OARRS reviewed.  Current MED: 56.00  · Patient was offered naloxone for home. Prescribed. · Discussed long term side effects of medications, tolerance, dependency and addiction. · Previous UDS reviewed  · UDS preformed today for compliance. · Patient told can not receive any pain medications from any other source. · No evidence of abuse, diversion or aberrant behavior.  Medications and/or procedures to improve function and quality of life- patient understanding with this and that may not be pain free   Discussed with patient about safe storage of medications at home   Discussed possible weaning of medication dosing dependent on treatment/procedure results.  Discussed with patient about risks with procedure including infection, reaction to medication, increased pain, or bleeding. · medication, increased pain, or bleeding. · Procedure and surgery notes reviewed. · No relief from C-facet MBB  · Continues to receive relief from SCS not as well as before. Was instructed to Call Manuel Ayala for adjustment   · Continue current pain medications- MS Contin 15 mg BID scheduled for maintenance pain relief, Norco 7.5 TID prn, Neurontin 800 mg TID. · Ordered Aqua therapy for neck pain, lower back pain and to improve mobility        Meds. Prescribed:   Orders Placed This Encounter   Medications    HYDROcodone-acetaminophen (NORCO) 7.5-325 MG per tablet     Sig: Take 1 tablet by mouth 3 times daily as needed for Pain for up to 30 days. Dispense:  90 tablet     Refill:  0     Reduce doses taken as pain becomes manageable    morphine (MS CONTIN) 15 MG extended release tablet     Sig: Take 1 tablet by mouth every 12 hours for 30 days.      Dispense:  60 tablet     Refill:  0     Reduce doses taken as pain becomes manageable    naloxone 4 MG/0.1ML LIQD nasal spray     Si spray by Nasal route as needed for Opioid Reversal     Dispense:  1 each     Refill:  0    gabapentin (NEURONTIN) 800 MG tablet     Sig: Take 1 tablet by mouth 3 times daily for 30 days. Dispense:  90 tablet     Refill:  0       Return in about 3 months (around 9/11/2019), or if symptoms worsen or fail to improve, for follow up  for medications.          Electronically signed by Maurita Lesch, APRN - CNP on6/11/2019 at 8:07 AM

## 2019-06-12 RX ORDER — DULOXETIN HYDROCHLORIDE 60 MG/1
CAPSULE, DELAYED RELEASE ORAL
Qty: 30 CAPSULE | Refills: 2 | Status: SHIPPED | OUTPATIENT
Start: 2019-06-12

## 2019-06-12 NOTE — TELEPHONE ENCOUNTER
Received request from [address] [place] [address] for refill for her Duloxetine. Last received 30 with 2 refills on 02/01/19.  Last visit on 5/30/19

## 2019-06-13 ENCOUNTER — NURSE ONLY (OUTPATIENT)
Dept: FAMILY MEDICINE CLINIC | Age: 54
End: 2019-06-13
Payer: MEDICARE

## 2019-06-13 ENCOUNTER — TELEPHONE (OUTPATIENT)
Dept: FAMILY MEDICINE CLINIC | Age: 54
End: 2019-06-13

## 2019-06-13 DIAGNOSIS — E55.9 VITAMIN D DEFICIENCY: ICD-10-CM

## 2019-06-13 DIAGNOSIS — K90.9 INTESTINAL MALABSORPTION, UNSPECIFIED TYPE: ICD-10-CM

## 2019-06-13 DIAGNOSIS — E11.9 TYPE 2 DIABETES MELLITUS WITHOUT COMPLICATION, WITHOUT LONG-TERM CURRENT USE OF INSULIN (HCC): Chronic | ICD-10-CM

## 2019-06-13 LAB
ALBUMIN SERPL-MCNC: 4 G/DL (ref 3.5–5.1)
ALP BLD-CCNC: 101 U/L (ref 38–126)
ALT SERPL-CCNC: 16 U/L (ref 11–66)
ANION GAP SERPL CALCULATED.3IONS-SCNC: 13 MEQ/L (ref 8–16)
AST SERPL-CCNC: 14 U/L (ref 5–40)
BASOPHILS # BLD: 0.5 %
BASOPHILS ABSOLUTE: 0 THOU/MM3 (ref 0–0.1)
BILIRUB SERPL-MCNC: 0.5 MG/DL (ref 0.3–1.2)
BUN BLDV-MCNC: 12 MG/DL (ref 7–22)
CALCIUM SERPL-MCNC: 9.7 MG/DL (ref 8.5–10.5)
CHLORIDE BLD-SCNC: 103 MEQ/L (ref 98–111)
CHOLESTEROL, TOTAL: 130 MG/DL (ref 100–199)
CO2: 30 MEQ/L (ref 23–33)
CREAT SERPL-MCNC: 0.5 MG/DL (ref 0.4–1.2)
EOSINOPHIL # BLD: 1.9 %
EOSINOPHILS ABSOLUTE: 0.1 THOU/MM3 (ref 0–0.4)
ERYTHROCYTE [DISTWIDTH] IN BLOOD BY AUTOMATED COUNT: 13.8 % (ref 11.5–14.5)
ERYTHROCYTE [DISTWIDTH] IN BLOOD BY AUTOMATED COUNT: 43.8 FL (ref 35–45)
FERRITIN: 64 NG/ML (ref 10–291)
GFR SERPL CREATININE-BSD FRML MDRD: > 90 ML/MIN/1.73M2
GLUCOSE BLD-MCNC: 118 MG/DL (ref 70–108)
HCT VFR BLD CALC: 39.8 % (ref 37–47)
HDLC SERPL-MCNC: 59 MG/DL
HEMOGLOBIN: 12.6 GM/DL (ref 12–16)
IMMATURE GRANS (ABS): 0.03 THOU/MM3 (ref 0–0.07)
IMMATURE GRANULOCYTES: 0.5 %
LDL CHOLESTEROL CALCULATED: 57 MG/DL
LYMPHOCYTES # BLD: 37.5 %
LYMPHOCYTES ABSOLUTE: 2.2 THOU/MM3 (ref 1–4.8)
MCH RBC QN AUTO: 27.6 PG (ref 26–33)
MCHC RBC AUTO-ENTMCNC: 31.7 GM/DL (ref 32.2–35.5)
MCV RBC AUTO: 87.3 FL (ref 81–99)
MONOCYTES # BLD: 8.7 %
MONOCYTES ABSOLUTE: 0.5 THOU/MM3 (ref 0.4–1.3)
NUCLEATED RED BLOOD CELLS: 0 /100 WBC
PLATELET # BLD: 278 THOU/MM3 (ref 130–400)
PMV BLD AUTO: 9.8 FL (ref 9.4–12.4)
POTASSIUM SERPL-SCNC: 4.1 MEQ/L (ref 3.5–5.2)
RBC # BLD: 4.56 MILL/MM3 (ref 4.2–5.4)
SEG NEUTROPHILS: 50.9 %
SEGMENTED NEUTROPHILS ABSOLUTE COUNT: 3 THOU/MM3 (ref 1.8–7.7)
SODIUM BLD-SCNC: 146 MEQ/L (ref 135–145)
TOTAL PROTEIN: 7 G/DL (ref 6.1–8)
TRIGL SERPL-MCNC: 72 MG/DL (ref 0–199)
VITAMIN B-12: 423 PG/ML (ref 211–911)
VITAMIN D 25-HYDROXY: 30 NG/ML (ref 30–100)
WBC # BLD: 5.9 THOU/MM3 (ref 4.8–10.8)

## 2019-06-13 PROCEDURE — 36415 COLL VENOUS BLD VENIPUNCTURE: CPT | Performed by: FAMILY MEDICINE

## 2019-06-20 ENCOUNTER — TELEPHONE (OUTPATIENT)
Dept: FAMILY MEDICINE CLINIC | Age: 54
End: 2019-06-20

## 2019-06-20 NOTE — TELEPHONE ENCOUNTER
Patient called for results of recent labs. Informed of results. Patient wondering about low Vitamin D level. Discussed with Dr. Chika Blum and informed patient that Dr. Chika Blum suggested she take 2000 IU of Vitamin D daily. Patient agreeable and will get at drug store. Will call with any problems.

## 2019-06-28 ENCOUNTER — HOSPITAL ENCOUNTER (OUTPATIENT)
Dept: PHYSICAL THERAPY | Age: 54
Setting detail: THERAPIES SERIES
Discharge: HOME | End: 2019-06-28
Payer: MEDICARE

## 2019-06-28 PROCEDURE — 97161 PT EVAL LOW COMPLEX 20 MIN: CPT

## 2019-06-28 PROCEDURE — 97110 THERAPEUTIC EXERCISES: CPT

## 2019-06-28 ASSESSMENT — PAIN DESCRIPTION - DESCRIPTORS: DESCRIPTORS: BURNING;CONSTANT;SHARP

## 2019-06-28 ASSESSMENT — PAIN SCALES - GENERAL: PAINLEVEL_OUTOF10: 8

## 2019-06-28 ASSESSMENT — PAIN DESCRIPTION - PAIN TYPE: TYPE: CHRONIC PAIN
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
## 2019-07-02 ENCOUNTER — HOSPITAL ENCOUNTER (OUTPATIENT)
Dept: PHYSICAL THERAPY | Age: 54
Setting detail: THERAPIES SERIES
Discharge: HOME | End: 2019-07-02
Payer: MEDICARE

## 2019-07-02 PROCEDURE — 97113 AQUATIC THERAPY/EXERCISES: CPT

## 2019-07-02 ASSESSMENT — PAIN SCALES - GENERAL: PAINLEVEL_OUTOF10: 7

## 2019-07-02 ASSESSMENT — PAIN DESCRIPTION - LOCATION: LOCATION: BACK;HIP;NECK;LEG

## 2019-07-02 ASSESSMENT — PAIN DESCRIPTION - PAIN TYPE: TYPE: CHRONIC PAIN

## 2019-07-02 NOTE — PROGRESS NOTES
[address] [facility]     Time In: 6926  Time Out: 1630  Minutes: 32  Timed Code Treatment Minutes: 32 Minutes     Date: 2019  Patient Name: Agustina Wiley,  Gender:  female        CSN: [id number]   : 1965  (53 y.o.)       Referring Practitioner: Miguelito Granda CNP      Diagnosis: Spondylosis of cervical jiont with myelopathy M47.12; Chronic bilateral low back pain with sciatica presence unspecificied M54.5, G89.29; Chronic pain syndrome G89.4  Treatment Diagnosis: chronic pain- cervical, BLE; core, posture, shoulder and hip weakness; impaired balance, difficulty ambulating           General:  PT Visit Information  Onset Date: 19  PT Insurance Information: Medicare- unlimited visits based on medical necessity, aquatics and modalities covered except ionto, HP/CP  Total # of Visits to Date: 2  Plan of Care/Certification Expiration Date: 19  Progress Note Counter: 2/10 for PN. Subjective:  Chart Reviewed: Yes  Patient assessed for rehabilitation services?: Yes  Family / Caregiver Present: No  Comments: Follow up with Trudy Nassar CNP every 3 months, next appt 9/10/19. Other (Comment): Aquatic therapy referral     Subjective: Patient states pain is in her hands, legs, back and neck.  Patient states pool therapy has helped in the past.     Pain:  Patient Currently in Pain: Yes  Pain Level: 7  Pain Type: Chronic pain  Pain Location: Back, Hip, Neck, Leg    Objective             Aquatic Therapy: UE, LE  LE Warm Up: Ambulation (F,L,R): x 2 laps   Static Strengthening UEs  Shoulder Flex: on first step x 10  Shoulder ABD/ADD: x10  Shoulder Horiz ABD/ADD: x10  Rows: x10  Shoulder Circles: x10  Shoulder Shrugs: x10        Static Strengthening LEs  Heel/Toe Raises: x 10 in 4'  Squats: x10  Marching: x10  Hamstring Curls: x10  4-Way Hip: 3 way hip x 10  Dynamic Strengthening LE  Step-Ups (F,L,R): x 10 forward and lateral   Deep H2O LE  Bike: x 3 minutes   Han minutes  Hip Flex/Ext: x10  Hip ABD/ADD: x10        Activity Tolerance: Tolerated well. Assessment: Body structures, Functions, Activity limitations: Decreased functional mobility , Decreased strength, Decreased endurance, Decreased ROM, Decreased ADL status  Assessment: Patient intiated Aquatic therapy with no difficulty. Patient moves well. Patient to monitor pain after session. Discharge Recommendations: Continue to assess pending progress    Patient Education:  Patient Education: Monitor pain after session. Plan:  Times per week: 3x  Plan weeks: 8 weeks  Specific instructions for Next Treatment: aquatics: upper and lower body stretches and strengthening, core strengthening, balance training  Current Treatment Recommendations: Strengthening, ROM, Balance Training, Gait Training, Stair training, Modalities, Aquatics, Home Exercise Program, Patient/Caregiver Education & Training, Manual Therapy - Soft Tissue Mobilization, Functional Mobility Training  Plan Comment: POC initiated. Goals:       Short term goals  Time Frame for Short term goals: 4 weeks  Short term goal 1: Pt will tolerate aquatic therapy sessions with at least 2 hours of symptom relief to improve overall mobility. Short term goal 2: Pt will improve B shoulder strength from 4-/5 to 4/5 and postural strength from 3/5 to 4-/5. Short term goal 3: Pt will improve B hip strength from 3+/5 to 4/5 (tested seated) for ease lifting legs to climb stairs and keep toes from catching when walking to decrease fall risk. Short term goal 4: Pt will demo good core engagement and postural awareness without cues during aquatic exercises to carry overall to sitting when reading/looking at phone.      Long term goals  Time Frame for Long term goals : 8 weeks  Long term goal 1: Pt will tolerate 10-15 reps dynamic lumbar stabilization exercises to improve standing and walking

## 2019-07-03 ENCOUNTER — HOSPITAL ENCOUNTER (OUTPATIENT)
Dept: PHYSICAL THERAPY | Age: 54
Setting detail: THERAPIES SERIES
Discharge: HOME | End: 2019-07-03
Payer: MEDICARE

## 2019-07-03 PROCEDURE — 97113 AQUATIC THERAPY/EXERCISES: CPT

## 2019-07-03 ASSESSMENT — PAIN DESCRIPTION - LOCATION: LOCATION: HAND;LEG;BACK

## 2019-07-03 ASSESSMENT — PAIN DESCRIPTION - PAIN TYPE: TYPE: CHRONIC PAIN

## 2019-07-03 ASSESSMENT — PAIN DESCRIPTION - ORIENTATION: ORIENTATION: LEFT;RIGHT

## 2019-07-03 ASSESSMENT — PAIN SCALES - GENERAL: PAINLEVEL_OUTOF10: 8

## 2019-07-03 NOTE — PROGRESS NOTES
Deep H2O LE  Bike: x 3 minutes   Han minutes  Hip Flex/Ext: x10 with noodle and UE on rail  Hip ABD/ADD: x10 with noodle and UE on rail           Activity Tolerance: Tolerated well. Assessment: Body structures, Functions, Activity limitations: Decreased functional mobility , Decreased strength, Decreased endurance, Decreased ROM  Assessment: Patient wanted to keep reps at 10, \" my whole back hurts today. \" Patient doing a swim motion instead of walking from area to area. Cues for bracing. Increase reps next visit. Discharge Recommendations: Continue to assess pending progress    Patient Education:  Patient Education: Monitor pain after session. Plan:  Times per week: 3x  Plan weeks: 8 weeks  Specific instructions for Next Treatment: aquatics: upper and lower body stretches and strengthening, core strengthening, balance training  Current Treatment Recommendations: Strengthening, ROM, Balance Training, Gait Training, Stair training, Modalities, Aquatics, Home Exercise Program, Patient/Caregiver Education & Training, Manual Therapy - Soft Tissue Mobilization, Functional Mobility Training  Plan Comment: Continue with POC. Goals:       Short term goals  Time Frame for Short term goals: 4 weeks  Short term goal 1: Pt will tolerate aquatic therapy sessions with at least 2 hours of symptom relief to improve overall mobility. Short term goal 2: Pt will improve B shoulder strength from 4-/5 to 4/5 and postural strength from 3/5 to 4-/5. Short term goal 3: Pt will improve B hip strength from 3+/5 to 4/5 (tested seated) for ease lifting legs to climb stairs and keep toes from catching when walking to decrease fall risk. Short term goal 4: Pt will demo good core engagement and postural awareness without cues during aquatic exercises to carry overall to sitting when reading/looking at phone.      Long term goals  Time Frame for Long term goals : 8 weeks  Long term goal 1: Pt will tolerate 10-15

## 2019-07-09 ENCOUNTER — HOSPITAL ENCOUNTER (OUTPATIENT)
Dept: PHYSICAL THERAPY | Age: 54
Setting detail: THERAPIES SERIES
Discharge: HOME | End: 2019-07-09
Payer: MEDICARE

## 2019-07-09 PROCEDURE — 97113 AQUATIC THERAPY/EXERCISES: CPT

## 2019-07-09 ASSESSMENT — PAIN DESCRIPTION - PAIN TYPE: TYPE: CHRONIC PAIN

## 2019-07-09 ASSESSMENT — PAIN DESCRIPTION - LOCATION: LOCATION: LEG;HAND

## 2019-07-09 ASSESSMENT — PAIN SCALES - GENERAL: PAINLEVEL_OUTOF10: 6

## 2019-07-11 ENCOUNTER — HOSPITAL ENCOUNTER (OUTPATIENT)
Dept: PHYSICAL THERAPY | Age: 54
Setting detail: THERAPIES SERIES
End: 2019-07-11
Payer: MEDICARE

## 2019-07-12 ENCOUNTER — HOSPITAL ENCOUNTER (OUTPATIENT)
Dept: PHYSICAL THERAPY | Age: 54
Setting detail: THERAPIES SERIES
Discharge: HOME | End: 2019-07-12
Payer: MEDICARE

## 2019-07-12 PROCEDURE — 97113 AQUATIC THERAPY/EXERCISES: CPT

## 2019-07-12 ASSESSMENT — PAIN SCALES - GENERAL: PAINLEVEL_OUTOF10: 9

## 2019-07-12 ASSESSMENT — PAIN DESCRIPTION - PAIN TYPE: TYPE: CHRONIC PAIN

## 2019-07-12 ASSESSMENT — PAIN DESCRIPTION - LOCATION: LOCATION: LEG

## 2019-07-12 ASSESSMENT — PAIN DESCRIPTION - ORIENTATION: ORIENTATION: RIGHT;LEFT

## 2019-07-12 NOTE — PROGRESS NOTES
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 Pt reports she is in too much pain.      Darryle Mass DPT, #[id number]
relief to improve overall mobility. Short term goal 2: Pt will improve B shoulder strength from 4-/5 to 4/5 and postural strength from 3/5 to 4-/5. Short term goal 3: Pt will improve B hip strength from 3+/5 to 4/5 (tested seated) for ease lifting legs to climb stairs and keep toes from catching when walking to decrease fall risk. Short term goal 4: Pt will demo good core engagement and postural awareness without cues during aquatic exercises to carry overall to sitting when reading/looking at phone. Long term goals  Time Frame for Long term goals : 8 weeks  Long term goal 1: Pt will tolerate 10-15 reps dynamic lumbar stabilization exercises to improve standing and walking tolerance. Long term goal 2: Pt will report less days of restricted cervical ROM for ease turning head to drive and scan environment. Long term goal 3: Pt will demo <25% lumbar/hip AROM restriction for ease with LE dressing and ADLs. Long term goal 4: Pt will be independent and compliant with aquatic and land HEP to achieve above goals.      Alejandrina Ramirez, PT

## 2019-07-15 ENCOUNTER — HOSPITAL ENCOUNTER (OUTPATIENT)
Dept: PHYSICAL THERAPY | Age: 54
Setting detail: THERAPIES SERIES
Discharge: HOME | End: 2019-07-15
Payer: MEDICARE

## 2019-07-15 PROCEDURE — 97113 AQUATIC THERAPY/EXERCISES: CPT

## 2019-07-15 ASSESSMENT — PAIN DESCRIPTION - DESCRIPTORS
DESCRIPTORS: BURNING;CONSTANT;SHARP
DESCRIPTORS: BURNING;CONSTANT;SHARP

## 2019-07-15 ASSESSMENT — PAIN DESCRIPTION - ORIENTATION
ORIENTATION: LEFT;RIGHT
ORIENTATION: LEFT;RIGHT

## 2019-07-15 ASSESSMENT — PAIN SCALES - GENERAL
PAINLEVEL_OUTOF10: 8
PAINLEVEL_OUTOF10: 8

## 2019-07-15 ASSESSMENT — PAIN DESCRIPTION - LOCATION
LOCATION: BACK;LEG
LOCATION: BACK;LEG

## 2019-07-15 ASSESSMENT — PAIN DESCRIPTION - PAIN TYPE
TYPE: CHRONIC PAIN
TYPE: CHRONIC PAIN

## 2019-07-17 ENCOUNTER — HOSPITAL ENCOUNTER (OUTPATIENT)
Dept: PHYSICAL THERAPY | Age: 54
Setting detail: THERAPIES SERIES
Discharge: HOME | End: 2019-07-17
Payer: MEDICARE

## 2019-07-17 PROCEDURE — 97113 AQUATIC THERAPY/EXERCISES: CPT

## 2019-07-17 ASSESSMENT — PAIN DESCRIPTION - LOCATION: LOCATION: LEG;BACK

## 2019-07-17 ASSESSMENT — PAIN SCALES - GENERAL: PAINLEVEL_OUTOF10: 8

## 2019-07-17 ASSESSMENT — PAIN DESCRIPTION - PAIN TYPE: TYPE: CHRONIC PAIN

## 2019-07-17 NOTE — PROGRESS NOTES
x20  Hamstring Curls: x20  4-Way Hip: 3 way hip x 20  Dynamic Strengthening LE  Step-Ups (F,L,R): x 20 forward and lateral   Deep H2O LE  Bike: x 3 minutes with noodle  Han-6 min end of session at rail with noodle 5'10''    Hip Flex/Ext: x20 with noodle and UE on rail  Hip ABD/ADD: x20 with noodle and UE on rail        Activity Tolerance: Tolerated well. Assessment: Body structures, Functions, Activity limitations: Decreased strength  Assessment: Patient was able to increase reps this session without complaints. Shows good knowledge of exercises. Monitor response to progression. Discharge Recommendations: Continue to assess pending progress    Patient Education:  Patient Education: Monitor symptoms. Focus on posture and abd bracing. Plan:  Times per week: 3x  Plan weeks: 8 weeks  Specific instructions for Next Treatment: aquatics: upper and lower body stretches and strengthening, core strengthening, balance training  Current Treatment Recommendations: Strengthening, ROM, Balance Training, Gait Training, Stair training, Modalities, Aquatics, Home Exercise Program, Patient/Caregiver Education & Training, Manual Therapy - Soft Tissue Mobilization, Functional Mobility Training  Plan Comment: Continue with POC. Goals:     Short term goals  Time Frame for Short term goals: 4 weeks  Short term goal 1: Pt will tolerate aquatic therapy sessions with at least 2 hours of symptom relief to improve overall mobility. Short term goal 2: Pt will improve B shoulder strength from 4-/5 to 4/5 and postural strength from 3/5 to 4-/5. Short term goal 3: Pt will improve B hip strength from 3+/5 to 4/5 (tested seated) for ease lifting legs to climb stairs and keep toes from catching when walking to decrease fall risk. Short term goal 4: Pt will demo good core engagement and postural awareness without cues during aquatic exercises to carry overall to sitting when reading/looking at phone.      Long term

## 2019-07-19 ENCOUNTER — HOSPITAL ENCOUNTER (OUTPATIENT)
Dept: PHYSICAL THERAPY | Age: 54
Setting detail: THERAPIES SERIES
Discharge: HOME | End: 2019-07-19
Payer: MEDICARE

## 2019-07-19 PROCEDURE — 97113 AQUATIC THERAPY/EXERCISES: CPT

## 2019-07-19 ASSESSMENT — PAIN SCALES - GENERAL: PAINLEVEL_OUTOF10: 9

## 2019-07-19 ASSESSMENT — PAIN DESCRIPTION - PAIN TYPE: TYPE: CHRONIC PAIN

## 2019-07-19 ASSESSMENT — PAIN DESCRIPTION - LOCATION: LOCATION: LEG

## 2019-07-19 ASSESSMENT — PAIN DESCRIPTION - ORIENTATION: ORIENTATION: RIGHT;LEFT

## 2019-07-22 DIAGNOSIS — G89.4 CHRONIC PAIN SYNDROME: ICD-10-CM

## 2019-07-22 DIAGNOSIS — G89.29 OTHER CHRONIC PAIN: ICD-10-CM

## 2019-07-23 RX ORDER — GABAPENTIN 800 MG/1
800 TABLET ORAL 3 TIMES DAILY
Qty: 90 TABLET | Refills: 0 | Status: SHIPPED | OUTPATIENT
Start: 2019-07-26 | End: 2019-08-22 | Stop reason: SDUPTHER

## 2019-07-23 RX ORDER — MORPHINE SULFATE 15 MG/1
15 TABLET, FILM COATED, EXTENDED RELEASE ORAL EVERY 12 HOURS
Qty: 60 TABLET | Refills: 0 | Status: SHIPPED | OUTPATIENT
Start: 2019-07-23 | End: 2019-08-22 | Stop reason: SDUPTHER

## 2019-07-23 RX ORDER — HYDROCODONE BITARTRATE AND ACETAMINOPHEN 7.5; 325 MG/1; MG/1
1 TABLET ORAL 3 TIMES DAILY PRN
Qty: 90 TABLET | Refills: 0 | Status: SHIPPED | OUTPATIENT
Start: 2019-07-23 | End: 2019-08-22 | Stop reason: SDUPTHER

## 2019-08-22 DIAGNOSIS — G89.29 OTHER CHRONIC PAIN: ICD-10-CM

## 2019-08-22 DIAGNOSIS — G89.4 CHRONIC PAIN SYNDROME: ICD-10-CM

## 2019-08-22 RX ORDER — MORPHINE SULFATE 15 MG/1
15 TABLET, FILM COATED, EXTENDED RELEASE ORAL EVERY 12 HOURS
Qty: 60 TABLET | Refills: 0 | Status: SHIPPED | OUTPATIENT
Start: 2019-08-22 | End: 2019-09-23 | Stop reason: SDUPTHER

## 2019-08-22 RX ORDER — HYDROCODONE BITARTRATE AND ACETAMINOPHEN 7.5; 325 MG/1; MG/1
1 TABLET ORAL 3 TIMES DAILY PRN
Qty: 90 TABLET | Refills: 0 | Status: SHIPPED | OUTPATIENT
Start: 2019-08-22 | End: 2019-09-23 | Stop reason: SDUPTHER

## 2019-08-22 RX ORDER — GABAPENTIN 800 MG/1
800 TABLET ORAL 3 TIMES DAILY
Qty: 90 TABLET | Refills: 0 | Status: SHIPPED | OUTPATIENT
Start: 2019-08-29 | End: 2019-09-23 | Stop reason: SDUPTHER

## 2019-08-22 NOTE — TELEPHONE ENCOUNTER
OARRS reviewed. UDS: + for  Gabapentin, morhpine, hydrocodone consistent. Narcan offered: yes  Last seen: 6/11/2019.  Follow-up:   Future Appointments   Date Time Provider Kaleb Camargo   9/10/2019  8:30 AM FADUMO Ryan - CNP [facility] - [facility]

## 2019-08-22 NOTE — TELEPHONE ENCOUNTER
Maeve Domingo called requesting a refill on the following medications:  Requested Prescriptions     Pending Prescriptions Disp Refills    HYDROcodone-acetaminophen (NORCO) 7.5-325 MG per tablet 90 tablet 0     Sig: Take 1 tablet by mouth 3 times daily as needed for Pain for up to 30 days.  gabapentin (NEURONTIN) 800 MG tablet 90 tablet 0     Sig: Take 1 tablet by mouth 3 times daily for 30 days.  morphine (MS CONTIN) 15 MG extended release tablet 60 tablet 0     Sig: Take 1 tablet by mouth every 12 hours for 30 days.      Pharmacy verified:  .pv    [facility]    Date of last visit: 6/11/19  Date of next visit (if applicable): 8/09/0887

## 2019-09-10 ENCOUNTER — OFFICE VISIT (OUTPATIENT)
Dept: PHYSICAL MEDICINE AND REHAB | Age: 54
End: 2019-09-10
Payer: MEDICARE

## 2019-09-10 VITALS
DIASTOLIC BLOOD PRESSURE: 78 MMHG | SYSTOLIC BLOOD PRESSURE: 126 MMHG | BODY MASS INDEX: 31.55 KG/M2 | HEIGHT: 67 IN | HEART RATE: 70 BPM | WEIGHT: 201 LBS

## 2019-09-10 DIAGNOSIS — M79.605 PAIN IN BOTH LOWER EXTREMITIES: ICD-10-CM

## 2019-09-10 DIAGNOSIS — M79.604 PAIN IN BOTH LOWER EXTREMITIES: ICD-10-CM

## 2019-09-10 DIAGNOSIS — G89.4 CHRONIC PAIN SYNDROME: ICD-10-CM

## 2019-09-10 DIAGNOSIS — M54.2 NECK PAIN: ICD-10-CM

## 2019-09-10 DIAGNOSIS — G89.29 CHRONIC BILATERAL LOW BACK PAIN, WITH SCIATICA PRESENCE UNSPECIFIED: ICD-10-CM

## 2019-09-10 DIAGNOSIS — M54.16 LUMBAR RADICULITIS: ICD-10-CM

## 2019-09-10 DIAGNOSIS — M47.12 SPONDYLOSIS OF CERVICAL JOINT WITH MYELOPATHY: Primary | ICD-10-CM

## 2019-09-10 DIAGNOSIS — G62.9 NEUROPATHY: ICD-10-CM

## 2019-09-10 DIAGNOSIS — G82.54 INCOMPLETE QUADRIPLEGIA AT C5-C8 LEVEL (HCC): ICD-10-CM

## 2019-09-10 DIAGNOSIS — M48.062 SPINAL STENOSIS OF LUMBAR REGION WITH NEUROGENIC CLAUDICATION: ICD-10-CM

## 2019-09-10 DIAGNOSIS — M47.816 SPONDYLOSIS OF LUMBAR REGION WITHOUT MYELOPATHY OR RADICULOPATHY: ICD-10-CM

## 2019-09-10 DIAGNOSIS — M54.5 CHRONIC BILATERAL LOW BACK PAIN, WITH SCIATICA PRESENCE UNSPECIFIED: ICD-10-CM

## 2019-09-10 PROCEDURE — 1036F TOBACCO NON-USER: CPT | Performed by: NURSE PRACTITIONER

## 2019-09-10 PROCEDURE — G8427 DOCREV CUR MEDS BY ELIG CLIN: HCPCS | Performed by: NURSE PRACTITIONER

## 2019-09-10 PROCEDURE — 99213 OFFICE O/P EST LOW 20 MIN: CPT | Performed by: NURSE PRACTITIONER

## 2019-09-10 PROCEDURE — 3017F COLORECTAL CA SCREEN DOC REV: CPT | Performed by: NURSE PRACTITIONER

## 2019-09-10 PROCEDURE — G8417 CALC BMI ABV UP PARAM F/U: HCPCS | Performed by: NURSE PRACTITIONER

## 2019-09-10 ASSESSMENT — ENCOUNTER SYMPTOMS: BACK PAIN: 1

## 2019-09-10 NOTE — PROGRESS NOTES
PO, Take by mouth, Disp: , Rfl:     Prenatal Vit-Fe Fumarate-FA (PRENATAL ONE DAILY PO), Take by mouth, Disp: , Rfl:     Multiple Vitamins-Minerals (THERAPEUTIC MULTIVITAMIN-MINERALS) tablet, Take 1 tablet by mouth daily, Disp: , Rfl:     iron sucrose (VENOFER) 20 MG/ML injection, Infuse 10 mLs intravenously every 3 days for 5 doses Infuse slowly over at least 5 minutes. , Disp: 50 mL, Rfl: 0    vitamin D (ERGOCALCIFEROL) 90751 units CAPS capsule, Take 1 capsule by mouth once a week for 12 doses, Disp: 12 capsule, Rfl: 0    docusate sodium (COLACE) 100 MG capsule, Take 100 mg by mouth as needed for Constipation, Disp: , Rfl:     vitamin B-12 (CYANOCOBALAMIN) 500 MCG tablet, Take 500 mcg by mouth daily, Disp: , Rfl:     Subjective:      Review of Systems   Musculoskeletal: Positive for arthralgias, back pain, gait problem, myalgias, neck pain and neck stiffness. Neurological: Positive for weakness and numbness. Objective:     Vitals:    09/10/19 0816   BP: 126/78   Site: Left Upper Arm   Position: Sitting   Cuff Size: Large Adult   Pulse: 70   Weight: 201 lb (91.2 kg)   Height: 5' 7\" (1.702 m)       Physical Exam   Constitutional: She is oriented to person, place, and time. She appears well-developed and well-nourished. No distress. HENT:   Head: Normocephalic and atraumatic. Right Ear: External ear normal.   Left Ear: External ear normal.   Nose: Nose normal.   Mouth/Throat: Oropharynx is clear and moist. No oropharyngeal exudate. Eyes: Pupils are equal, round, and reactive to light. Conjunctivae and EOM are normal. Right eye exhibits no discharge. Left eye exhibits no discharge. No scleral icterus. Neck: Normal range of motion and full passive range of motion without pain. Neck supple. No muscular tenderness present. No neck rigidity. No edema, no erythema and normal range of motion present. No thyromegaly present.    Cardiovascular: Normal rate, regular rhythm, normal heart sounds and intact

## 2019-09-20 DIAGNOSIS — G89.4 CHRONIC PAIN SYNDROME: ICD-10-CM

## 2019-09-20 DIAGNOSIS — G89.29 OTHER CHRONIC PAIN: ICD-10-CM

## 2019-09-23 RX ORDER — MORPHINE SULFATE 15 MG/1
15 TABLET, FILM COATED, EXTENDED RELEASE ORAL EVERY 12 HOURS
Qty: 60 TABLET | Refills: 0 | Status: SHIPPED | OUTPATIENT
Start: 2019-09-23 | End: 2019-10-23 | Stop reason: SDUPTHER

## 2019-09-23 RX ORDER — GABAPENTIN 800 MG/1
800 TABLET ORAL 3 TIMES DAILY
Qty: 90 TABLET | Refills: 0 | Status: SHIPPED | OUTPATIENT
Start: 2019-09-27 | End: 2019-10-23 | Stop reason: SDUPTHER

## 2019-09-23 RX ORDER — HYDROCODONE BITARTRATE AND ACETAMINOPHEN 7.5; 325 MG/1; MG/1
1 TABLET ORAL 3 TIMES DAILY PRN
Qty: 90 TABLET | Refills: 0 | Status: SHIPPED | OUTPATIENT
Start: 2019-09-23 | End: 2019-10-23 | Stop reason: SDUPTHER

## 2019-10-22 DIAGNOSIS — G89.4 CHRONIC PAIN SYNDROME: ICD-10-CM

## 2019-10-22 DIAGNOSIS — G89.29 OTHER CHRONIC PAIN: ICD-10-CM

## 2019-10-23 RX ORDER — MORPHINE SULFATE 15 MG/1
15 TABLET, FILM COATED, EXTENDED RELEASE ORAL EVERY 12 HOURS
Qty: 60 TABLET | Refills: 0 | Status: SHIPPED | OUTPATIENT
Start: 2019-10-23 | End: 2019-11-25 | Stop reason: SDUPTHER

## 2019-10-23 RX ORDER — GABAPENTIN 800 MG/1
800 TABLET ORAL 3 TIMES DAILY
Qty: 90 TABLET | Refills: 0 | Status: SHIPPED | OUTPATIENT
Start: 2019-10-23 | End: 2019-11-18 | Stop reason: SDUPTHER

## 2019-10-23 RX ORDER — HYDROCODONE BITARTRATE AND ACETAMINOPHEN 7.5; 325 MG/1; MG/1
1 TABLET ORAL 3 TIMES DAILY PRN
Qty: 90 TABLET | Refills: 0 | Status: SHIPPED | OUTPATIENT
Start: 2019-10-27 | End: 2019-11-25 | Stop reason: SDUPTHER

## 2019-11-18 DIAGNOSIS — G89.29 OTHER CHRONIC PAIN: ICD-10-CM

## 2019-11-18 RX ORDER — GABAPENTIN 800 MG/1
800 TABLET ORAL 3 TIMES DAILY
Qty: 90 TABLET | Refills: 0 | Status: SHIPPED | OUTPATIENT
Start: 2019-11-26 | End: 2019-12-16 | Stop reason: SDUPTHER

## 2019-11-25 DIAGNOSIS — G89.4 CHRONIC PAIN SYNDROME: ICD-10-CM

## 2019-11-25 RX ORDER — MORPHINE SULFATE 15 MG/1
15 TABLET, FILM COATED, EXTENDED RELEASE ORAL EVERY 12 HOURS
Qty: 60 TABLET | Refills: 0 | Status: SHIPPED | OUTPATIENT
Start: 2019-11-25 | End: 2019-12-27 | Stop reason: SDUPTHER

## 2019-11-25 RX ORDER — HYDROCODONE BITARTRATE AND ACETAMINOPHEN 7.5; 325 MG/1; MG/1
1 TABLET ORAL 3 TIMES DAILY PRN
Qty: 90 TABLET | Refills: 0 | Status: SHIPPED | OUTPATIENT
Start: 2019-11-25 | End: 2019-12-27 | Stop reason: SDUPTHER

## 2019-12-10 ENCOUNTER — TELEPHONE (OUTPATIENT)
Dept: PHYSICAL MEDICINE AND REHAB | Age: 54
End: 2019-12-10

## 2019-12-16 DIAGNOSIS — G89.29 OTHER CHRONIC PAIN: ICD-10-CM

## 2019-12-16 RX ORDER — GABAPENTIN 800 MG/1
800 TABLET ORAL 3 TIMES DAILY
Qty: 90 TABLET | Refills: 0 | Status: SHIPPED | OUTPATIENT
Start: 2019-12-20 | End: 2019-12-18

## 2019-12-18 DIAGNOSIS — G89.29 OTHER CHRONIC PAIN: ICD-10-CM

## 2019-12-18 RX ORDER — GABAPENTIN 800 MG/1
TABLET ORAL
Qty: 90 TABLET | Refills: 0 | Status: SHIPPED | OUTPATIENT
Start: 2019-12-18 | End: 2020-01-10

## 2019-12-27 DIAGNOSIS — G89.4 CHRONIC PAIN SYNDROME: ICD-10-CM

## 2019-12-27 RX ORDER — HYDROCODONE BITARTRATE AND ACETAMINOPHEN 7.5; 325 MG/1; MG/1
1 TABLET ORAL 3 TIMES DAILY PRN
Qty: 90 TABLET | Refills: 0 | Status: SHIPPED | OUTPATIENT
Start: 2019-12-27 | End: 2020-01-26

## 2019-12-27 RX ORDER — MORPHINE SULFATE 15 MG/1
15 TABLET, FILM COATED, EXTENDED RELEASE ORAL EVERY 12 HOURS
Qty: 60 TABLET | Refills: 0 | Status: SHIPPED | OUTPATIENT
Start: 2019-12-27 | End: 2020-01-26

## 2020-01-09 ENCOUNTER — OFFICE VISIT (OUTPATIENT)
Dept: PHYSICAL MEDICINE AND REHAB | Age: 55
End: 2020-01-09
Payer: MEDICARE

## 2020-01-09 VITALS
SYSTOLIC BLOOD PRESSURE: 138 MMHG | DIASTOLIC BLOOD PRESSURE: 74 MMHG | WEIGHT: 201.06 LBS | HEIGHT: 67 IN | BODY MASS INDEX: 31.56 KG/M2

## 2020-01-09 PROCEDURE — 99213 OFFICE O/P EST LOW 20 MIN: CPT | Performed by: NURSE PRACTITIONER

## 2020-01-09 RX ORDER — NALOXONE HYDROCHLORIDE 4 MG/.1ML
1 SPRAY NASAL PRN
Qty: 1 EACH | Refills: 0 | Status: SHIPPED | OUTPATIENT
Start: 2020-01-09 | End: 2021-06-21

## 2020-01-09 RX ORDER — BUSPIRONE HYDROCHLORIDE 15 MG/1
TABLET ORAL
COMMUNITY
Start: 2019-12-26

## 2020-01-09 ASSESSMENT — ENCOUNTER SYMPTOMS: BACK PAIN: 1

## 2020-01-09 NOTE — PROGRESS NOTES
[facility]  [address] [facility] [place]  Dept: [phone]  Dept Fax: [phone]: [phone]    Visit Date: 1/9/2020    Functionality Assessment/Goals Worksheet     On a scale of 0 (Does not Interfere) to 10 (Completely Interferes)     1. Which number describes how during the past week pain has interfered with       the following:  A. General Activity:  8  B. Mood: 10  C. Walking Ability:  8  D. Normal Work (Includes both work outside the home and housework):  10  E. Relations with Other People:   0  F. Sleep:   0  G. Enjoyment of Life:   10    2. Patient Prefers to Take their Pain Medications:     [x]  On a regular basis   [x]  Only when necessary    []  Does not take pain medications    3. What are the Patient's Goals/Expectations for Visiting Pain Management? []  Learn about my pain    [x]  Receive Medication   [x]  Physical Therapy     []  Treat Depression   [x]  Receive Injections    []  Treat Sleep   []  Deal with Anxiety and Stress   []  Treat Opoid Dependence/Addiction   []  Other:      HPI:   Diana Lopez is a 53 y.o. female is here today for    Chief Complaint: Lower back pain, leg pain, neck pain     HPI   4 month FU. Main complaint remains pain across lower back and down bilateral legs to feet. SCS is on and continues to help but patient feels she is still having some increased pain and may need it adjusted. Pain is described as burning, constant shooting pain. Worse in legs. Continues to have pain in posterior neck mostly with RO- sharp with numbness in hands. Since improvement with SCS discussed decreasing pain medication. Pain medications continue to help     Medications reviewed. Patient denies side effects with medications. Patient states she is taking medications as prescribed. Shedenies receiving pain medications from other sources.  She denies any ER visits Disease in her mother; High Blood Pressure in her father. Social History  Bong  reports that she quit smoking about 19 years ago. She has a 23.00 pack-year smoking history. She has never used smokeless tobacco. She reports that she does not drink alcohol or use drugs. Medications    Current Outpatient Medications:     morphine (MS CONTIN) 15 MG extended release tablet, Take 1 tablet by mouth every 12 hours for 30 days. , Disp: 60 tablet, Rfl: 0    HYDROcodone-acetaminophen (NORCO) 7.5-325 MG per tablet, Take 1 tablet by mouth 3 times daily as needed for Pain for up to 30 days. , Disp: 90 tablet, Rfl: 0    gabapentin (NEURONTIN) 800 MG tablet, take 1 tablet by mouth three times a day, Disp: 90 tablet, Rfl: 0    DULoxetine (CYMBALTA) 60 MG extended release capsule, take 1 capsule by mouth once daily, Disp: 30 capsule, Rfl: 2    naloxone 4 MG/0.1ML LIQD nasal spray, 1 spray by Nasal route as needed for Opioid Reversal, Disp: 1 each, Rfl: 0    DULoxetine (CYMBALTA) 60 MG extended release capsule, take 1 capsule by mouth once daily, Disp: 30 capsule, Rfl: 1    amitriptyline (ELAVIL) 50 MG tablet, take 1 tablet by mouth at bedtime, Disp: 90 tablet, Rfl: 1    metFORMIN (GLUCOPHAGE) 500 MG tablet, take 1 tablet by mouth twice a day with meals, Disp: 180 tablet, Rfl: 1    mirtazapine (REMERON) 30 MG tablet, take 1 tablet by mouth at bedtime, Disp: 30 tablet, Rfl: 2    triamcinolone (KENALOG) 0.1 % cream, Apply topically 2 times daily. , Disp: 30 g, Rfl: 0    glucose monitoring kit (FREESTYLE) monitoring kit, 1 kit by Does not apply route daily, Disp: 1 kit, Rfl: 0    blood glucose test strips (ASCENSIA AUTODISC VI;ONE TOUCH ULTRA TEST VI) strip, Use with associated glucose meter to test up to once daily. , Disp: 100 strip, Rfl: 11    Ascorbic Acid (VITAMIN C PO), Take by mouth, Disp: , Rfl:     BIOTIN PO, Take by mouth, Disp: , Rfl:     Prenatal Vit-Fe Fumarate-FA (PRENATAL ONE DAILY PO), Take by mouth, Disp: , Rfl:     Multiple Vitamins-Minerals (THERAPEUTIC MULTIVITAMIN-MINERALS) tablet, Take 1 tablet by mouth daily, Disp: , Rfl:     docusate sodium (COLACE) 100 MG capsule, Take 100 mg by mouth as needed for Constipation, Disp: , Rfl:     vitamin B-12 (CYANOCOBALAMIN) 500 MCG tablet, Take 500 mcg by mouth daily, Disp: , Rfl:     busPIRone (BUSPAR) 15 MG tablet, , Disp: , Rfl:     iron sucrose (VENOFER) 20 MG/ML injection, Infuse 10 mLs intravenously every 3 days for 5 doses Infuse slowly over at least 5 minutes. , Disp: 50 mL, Rfl: 0    vitamin D (ERGOCALCIFEROL) 15020 units CAPS capsule, Take 1 capsule by mouth once a week for 12 doses, Disp: 12 capsule, Rfl: 0    Subjective:      Review of Systems   Musculoskeletal: Positive for arthralgias, back pain, myalgias, neck pain and neck stiffness. Neurological: Positive for weakness and numbness. Objective:     Vitals:    01/09/20 0939   BP: 138/74   Site: Left Lower Arm   Position: Sitting   Cuff Size: Medium Adult   Weight: 201 lb 1 oz (91.2 kg)   Height: 5' 7.01\" (1.702 m)       Physical Exam  Vitals signs and nursing note reviewed. Constitutional:       General: She is not in acute distress. Appearance: She is well-developed. She is not diaphoretic. HENT:      Head: Normocephalic and atraumatic. Right Ear: External ear normal.      Left Ear: External ear normal.      Nose: Nose normal.      Mouth/Throat:      Pharynx: No oropharyngeal exudate. Eyes:      General: No scleral icterus. Right eye: No discharge. Left eye: No discharge. Conjunctiva/sclera: Conjunctivae normal.      Pupils: Pupils are equal, round, and reactive to light. Neck:      Musculoskeletal: Full passive range of motion without pain, normal range of motion and neck supple. Normal range of motion. No edema, erythema, neck rigidity or muscular tenderness. Thyroid: No thyromegaly.    Cardiovascular:      Rate and Rhythm: Normal rate and regular rhythm. Heart sounds: Normal heart sounds. No murmur. No friction rub. No gallop. Pulmonary:      Effort: Pulmonary effort is normal. No respiratory distress. Breath sounds: Normal breath sounds. No wheezing or rales. Chest:      Chest wall: No tenderness. Abdominal:      General: Bowel sounds are normal. There is no distension. Palpations: Abdomen is soft. Tenderness: There is no tenderness. There is no guarding or rebound. Musculoskeletal:         General: Tenderness present. Right shoulder: She exhibits decreased range of motion and tenderness. Left shoulder: She exhibits decreased range of motion and tenderness. Right elbow: Tenderness found. Left elbow: Tenderness found. Right wrist: She exhibits tenderness. Left wrist: She exhibits tenderness. Right hip: She exhibits tenderness. Left hip: She exhibits tenderness. Right knee: Tenderness found. Left knee: She exhibits decreased range of motion and swelling. Tenderness found. Right ankle: She exhibits swelling. Tenderness. Left ankle: She exhibits decreased range of motion and swelling. Tenderness. Cervical back: She exhibits decreased range of motion, tenderness, pain and spasm. Thoracic back: She exhibits tenderness. Lumbar back: She exhibits tenderness, pain and spasm. Back:       Right upper arm: She exhibits tenderness. Left upper arm: She exhibits tenderness. Right forearm: She exhibits tenderness. Left forearm: She exhibits tenderness. Right hand: She exhibits tenderness. Decreased strength noted. Left hand: She exhibits tenderness. Decreased strength noted. Right upper leg: She exhibits tenderness. Left upper leg: She exhibits tenderness. Right lower leg: She exhibits tenderness and swelling. Left lower leg: She exhibits tenderness and swelling. Right foot: Tenderness and swelling present. stenosis of lumbar region with neurogenic claudication    4. Incomplete quadriplegia at C5-C8 level ([facility] [address]    5. Peripheral polyneuropathy    6. Spondylosis of lumbar region without myelopathy or radiculopathy    7. Chronic pain syndrome            Plan:      · OARRS reviewed. Current MED: 52.50  · Patient was offered naloxone for home. Prescribed  · Discussed long term side effects of medications, tolerance, dependency and addiction. · Previous UDS reviewed  · UDS preformed today for compliance. · Patient told can not receive any pain medications from any other source. · No evidence of abuse, diversion or aberrant behavior.  Medications and/or procedures to improve function and quality of life- patient understanding with this and that may not be pain free   Discussed with patient about safe storage of medications at home   Discussed possible weaning of medication dosing dependent on treatment/procedure results.  Discussed with patient about risks with procedure including infection, reaction to medication, increased pain, or bleeding. · Procedure and surgery notes reviewed. · No relief from C-facet MBB  · SCS continue to help, but leg pain is increased and needs adjusted by [employer]. Discussed talking to rep to see if we could possible do a future cervical trial.   · Discussed weaning pain medications. Continue MS Contin 15 mg BID scheduled for maintenance pain relief. Will decrease Norco to 5/325 TID prn with next refill recently filled 12/27/2019  · Continue Neurontin 800 TID       Meds. Prescribed:   No orders of the defined types were placed in this encounter. Return in about 3 months (around 4/9/2020), or if symptoms worsen or fail to improve, for follow up  for medications.          Electronically signed by FADUMO Muñoz CNP on1/9/2020 at 10:08 AM

## 2020-01-10 NOTE — TELEPHONE ENCOUNTER
OARRS reviewed. UDS: + for  Morphine, gabapentin, norco - consistent     Narcan offered: yes       Last seen: 1/9/2020.        Follow-up:   Future Appointments   Date Time Provider Kaleb Camargo   4/9/2020  9:45 AM FADUMO Heredia - CNP [facility] - [facility]

## 2020-01-13 RX ORDER — GABAPENTIN 800 MG/1
800 TABLET ORAL 3 TIMES DAILY
Qty: 90 TABLET | Refills: 0 | Status: SHIPPED | OUTPATIENT
Start: 2020-01-15 | End: 2020-02-03 | Stop reason: SDUPTHER

## 2020-02-03 RX ORDER — GABAPENTIN 800 MG/1
800 TABLET ORAL 3 TIMES DAILY
Qty: 90 TABLET | Refills: 0 | Status: SHIPPED | OUTPATIENT
Start: 2020-02-03 | End: 2020-02-06 | Stop reason: SDUPTHER

## 2020-02-03 RX ORDER — MORPHINE SULFATE 15 MG/1
15 TABLET, FILM COATED, EXTENDED RELEASE ORAL EVERY 12 HOURS
Qty: 60 TABLET | Refills: 0 | Status: SHIPPED | OUTPATIENT
Start: 2020-02-03 | End: 2020-02-06 | Stop reason: SDUPTHER

## 2020-02-03 RX ORDER — HYDROCODONE BITARTRATE AND ACETAMINOPHEN 7.5; 325 MG/1; MG/1
1 TABLET ORAL EVERY 8 HOURS PRN
Qty: 90 TABLET | Refills: 0 | Status: SHIPPED | OUTPATIENT
Start: 2020-02-03 | End: 2020-03-04

## 2020-02-05 NOTE — TELEPHONE ENCOUNTER
Patient called and states she wants her meds to go to [facility] pharmacy [address] [place].

## 2020-02-05 NOTE — TELEPHONE ENCOUNTER
This is the pharmacy in the system.  Called and LM for patient to see if she wanted meds canceled and re ordered to new pharmacy

## 2020-02-06 RX ORDER — MORPHINE SULFATE 15 MG/1
15 TABLET, FILM COATED, EXTENDED RELEASE ORAL EVERY 12 HOURS
Qty: 60 TABLET | Refills: 0 | Status: SHIPPED | OUTPATIENT
Start: 2020-02-06 | End: 2020-03-04 | Stop reason: SDUPTHER

## 2020-02-06 RX ORDER — GABAPENTIN 800 MG/1
800 TABLET ORAL 3 TIMES DAILY
Qty: 90 TABLET | Refills: 0 | Status: SHIPPED | OUTPATIENT
Start: 2020-02-06 | End: 2020-03-04 | Stop reason: SDUPTHER

## 2020-02-06 RX ORDER — HYDROCODONE BITARTRATE AND ACETAMINOPHEN 7.5; 325 MG/1; MG/1
1 TABLET ORAL EVERY 8 HOURS PRN
Qty: 90 TABLET | Refills: 0 | Status: CANCELLED | OUTPATIENT
Start: 2020-02-06 | End: 2020-03-07

## 2020-02-06 RX ORDER — HYDROCODONE BITARTRATE AND ACETAMINOPHEN 5; 325 MG/1; MG/1
1 TABLET ORAL EVERY 8 HOURS PRN
Qty: 90 TABLET | Refills: 0 | Status: SHIPPED | OUTPATIENT
Start: 2020-02-06 | End: 2020-03-04 | Stop reason: SDUPTHER

## 2020-02-06 NOTE — TELEPHONE ENCOUNTER
This patient calls today upset that this is delayed again another day? She says she can barely walk. She has not used [address] [place] [address] [facility] in [place] for 6 months because she moved to [place]. She tells me her prescriptions have been getting sent to [facility] in [facility] so she isn't sure why they now went to [address] [place] [address] in [place]. (She asked if that pharmacy could be removed from her chart?) She is not picking those up so if those can be transferred or cancelled and new ones sent, whatever is needed to get these today. Can someone please take a look at this and inform patient?

## 2020-02-06 NOTE — TELEPHONE ENCOUNTER
Medications needs sent to pharmacy in [place], [place] not in [facility]  Cancelled at the pharmacy in [address] if you can re-send. Decreased Norco to 5-325 mg TID per last note.

## 2020-03-04 RX ORDER — GABAPENTIN 800 MG/1
800 TABLET ORAL 3 TIMES DAILY
Qty: 90 TABLET | Refills: 0 | Status: SHIPPED | OUTPATIENT
Start: 2020-03-07 | End: 2020-03-31

## 2020-03-04 RX ORDER — MORPHINE SULFATE 15 MG/1
15 TABLET, FILM COATED, EXTENDED RELEASE ORAL EVERY 12 HOURS
Qty: 60 TABLET | Refills: 0 | Status: SHIPPED | OUTPATIENT
Start: 2020-03-07 | End: 2020-04-06 | Stop reason: SDUPTHER

## 2020-03-04 RX ORDER — HYDROCODONE BITARTRATE AND ACETAMINOPHEN 5; 325 MG/1; MG/1
1 TABLET ORAL EVERY 8 HOURS PRN
Qty: 90 TABLET | Refills: 0 | Status: SHIPPED | OUTPATIENT
Start: 2020-03-07 | End: 2020-04-06 | Stop reason: SDUPTHER

## 2020-03-04 NOTE — TELEPHONE ENCOUNTER
Max Edelson called requesting a refill on the following medications:  Requested Prescriptions     Pending Prescriptions Disp Refills    morphine (MS CONTIN) 15 MG extended release tablet 60 tablet 0     Sig: Take 1 tablet by mouth every 12 hours for 30 days.  HYDROcodone-acetaminophen (NORCO) 5-325 MG per tablet 90 tablet 0     Sig: Take 1 tablet by mouth every 8 hours as needed for Pain for up to 30 days.  gabapentin (NEURONTIN) 800 MG tablet 90 tablet 0     Sig: Take 1 tablet by mouth 3 times daily for 30 days. Pharmacy verified:  [id number]      Date of last visit:  1/9/2020  Date of next visit (if applicable): 0/4/6524

## 2020-03-09 ENCOUNTER — TELEPHONE (OUTPATIENT)
Dept: PHYSICAL MEDICINE AND REHAB | Age: 55
End: 2020-03-09

## 2020-03-31 RX ORDER — GABAPENTIN 800 MG/1
800 TABLET ORAL 3 TIMES DAILY
Qty: 90 TABLET | Refills: 0 | Status: SHIPPED | OUTPATIENT
Start: 2020-04-08 | End: 2020-04-06 | Stop reason: SDUPTHER

## 2020-03-31 NOTE — TELEPHONE ENCOUNTER
OARRS reviewed. UDS: + for  Norco, morphine and gabapentin. Narcan offered: yes  Last seen: 1/9/2020.  Follow-up: 04/09/2020

## 2020-04-02 NOTE — TELEPHONE ENCOUNTER
Jeri Rodriguez called requesting a refill on the following medications:  Requested Prescriptions     Pending Prescriptions Disp Refills    HYDROcodone-acetaminophen (NORCO) 5-325 MG per tablet 90 tablet 0     Sig: Take 1 tablet by mouth every 8 hours as needed for Pain for up to 30 days.  morphine (MS CONTIN) 15 MG extended release tablet 60 tablet 0     Sig: Take 1 tablet by mouth every 12 hours for 30 days.  gabapentin (NEURONTIN) 800 MG tablet 90 tablet 0     Sig: Take 1 tablet by mouth 3 times daily for 30 days. Pharmacy verified:[facility]   . pv      Date of last visit: 1-9-20  Date of next visit (if applicable): 9/6/2909        Asking if Gabapentin can be 90 day supply

## 2020-04-06 RX ORDER — GABAPENTIN 800 MG/1
800 TABLET ORAL 3 TIMES DAILY
Qty: 90 TABLET | Refills: 0 | Status: SHIPPED | OUTPATIENT
Start: 2020-04-08 | End: 2020-06-02 | Stop reason: SDUPTHER

## 2020-04-06 RX ORDER — MORPHINE SULFATE 15 MG/1
15 TABLET, FILM COATED, EXTENDED RELEASE ORAL EVERY 12 HOURS
Qty: 60 TABLET | Refills: 0 | Status: SHIPPED | OUTPATIENT
Start: 2020-04-08 | End: 2020-04-28 | Stop reason: SDUPTHER

## 2020-04-06 RX ORDER — HYDROCODONE BITARTRATE AND ACETAMINOPHEN 5; 325 MG/1; MG/1
1 TABLET ORAL EVERY 8 HOURS PRN
Qty: 90 TABLET | Refills: 0 | Status: SHIPPED | OUTPATIENT
Start: 2020-04-08 | End: 2020-05-22 | Stop reason: SDUPTHER

## 2020-04-06 NOTE — TELEPHONE ENCOUNTER
OARRS reviewed. UDS: + for  Norco, gabapentin and morphine. Narcan offered: yes  Last seen: 1/9/2020.  Follow-up: 04/09/2020

## 2020-04-09 ENCOUNTER — VIRTUAL VISIT (OUTPATIENT)
Dept: PHYSICAL MEDICINE AND REHAB | Age: 55
End: 2020-04-09
Payer: MEDICARE

## 2020-04-09 PROCEDURE — 99213 OFFICE O/P EST LOW 20 MIN: CPT | Performed by: NURSE PRACTITIONER

## 2020-04-09 RX ORDER — NALOXONE HYDROCHLORIDE 4 MG/.1ML
1 SPRAY NASAL PRN
Qty: 1 EACH | Refills: 0 | Status: SHIPPED | OUTPATIENT
Start: 2020-04-09 | End: 2021-06-21

## 2020-04-09 ASSESSMENT — ENCOUNTER SYMPTOMS
GASTROINTESTINAL NEGATIVE: 1
RESPIRATORY NEGATIVE: 1
BACK PAIN: 1
EYES NEGATIVE: 1

## 2020-04-09 NOTE — PROGRESS NOTES
HPI:   Selene Espinoza is a 53 y.o. female is here today for    Chief Complaint: Lower back pain, leg pain, neck pain     HPI   Video Visit. TELEHEALTH EVALUATION -- Audio/Visual (During GLSRV-68 public health emergency). This visit was completed virtually using doxy. me. Location of visit: Patients home and providers home. 3 month FU. Continues to have pain across lower back radiating down legs in which SCS is helping decraesed/ Burning and shooting pain. Has some period of increased pain since Norco was decraesed but overall tolerated it. Still hasn't met with [facility] rep to get SCS adjusted;. Continues to have posterior neck pain with numbness in the hands. Current medications remain effective in decreasing pain to a tolerable level. Medications reviewed. Patient denies side effects with medications. Patient states she is taking medications as prescribed. Shedenies receiving pain medications from other sources. She denies any ER visits since last visit. Pain scale with out pain medications or at its worst is 7--9/10. Pain scale with pain medications or at its best is 4/10. Last dose of MS ER, Norco, and neurontin was today  Drug screen reviewed from 2020 and was appropriate  Pill count was not completed today d/t virus  Patient does have naloxone available at home. Patient has not required use of naloxone at home since last office visit. The patientis allergic to ibuprofen. Past Medical History  Julia Abbott  has a past medical history of DM2 (diabetes mellitus, type 2) ([address] Endometriosis, Esophageal stricture, Female bladder prolapse, Former smoker, History of gastric bypass, Hypotension, Iron deficiency anemia, Obesity, Pulmonary nodule, Spinal cord injury, C5-C7 ([address] Vitamin D deficiency, and Wears glasses. Past Surgical History  The patient  has a past surgical history that includes Gastric bypass surgery (); Hysterectomy ();   section, low transverse;

## 2020-04-28 RX ORDER — MORPHINE SULFATE 15 MG/1
15 TABLET, FILM COATED, EXTENDED RELEASE ORAL EVERY 12 HOURS
Qty: 60 TABLET | Refills: 0 | Status: SHIPPED | OUTPATIENT
Start: 2020-04-29 | End: 2020-06-02 | Stop reason: SDUPTHER

## 2020-05-22 RX ORDER — HYDROCODONE BITARTRATE AND ACETAMINOPHEN 5; 325 MG/1; MG/1
1 TABLET ORAL EVERY 8 HOURS PRN
Qty: 90 TABLET | Refills: 0 | Status: SHIPPED | OUTPATIENT
Start: 2020-05-22 | End: 2020-06-18 | Stop reason: SDUPTHER

## 2020-05-22 NOTE — TELEPHONE ENCOUNTER
OARRS reviewed.  UDS: + for  Norco, gabapentin, morphine - consistent      Narcan offered: yes, has       Last seen: 1/9/2020. - VV 04/09/2020      Follow-up:   Future Appointments   Date Time Provider Kaleb Camargo   6/18/2020  9:30 AM FAUDMO Lacey - CNP [facility] - [facility]

## 2020-06-02 RX ORDER — MORPHINE SULFATE 15 MG/1
15 TABLET, FILM COATED, EXTENDED RELEASE ORAL EVERY 12 HOURS
Qty: 60 TABLET | Refills: 0 | Status: SHIPPED | OUTPATIENT
Start: 2020-06-02 | End: 2020-07-06 | Stop reason: SDUPTHER

## 2020-06-02 RX ORDER — GABAPENTIN 800 MG/1
800 TABLET ORAL 3 TIMES DAILY
Qty: 90 TABLET | Refills: 0 | Status: SHIPPED | OUTPATIENT
Start: 2020-06-04 | End: 2020-07-06 | Stop reason: SDUPTHER

## 2020-06-18 ENCOUNTER — VIRTUAL VISIT (OUTPATIENT)
Dept: PHYSICAL MEDICINE AND REHAB | Age: 55
End: 2020-06-18
Payer: MEDICARE

## 2020-06-18 PROCEDURE — 99213 OFFICE O/P EST LOW 20 MIN: CPT | Performed by: NURSE PRACTITIONER

## 2020-06-18 RX ORDER — HYDROCODONE BITARTRATE AND ACETAMINOPHEN 5; 325 MG/1; MG/1
1 TABLET ORAL EVERY 8 HOURS PRN
Qty: 90 TABLET | Refills: 0 | Status: SHIPPED | OUTPATIENT
Start: 2020-06-21 | End: 2020-07-06 | Stop reason: SDUPTHER

## 2020-06-18 ASSESSMENT — ENCOUNTER SYMPTOMS
BACK PAIN: 1
EYES NEGATIVE: 1
RESPIRATORY NEGATIVE: 1
GASTROINTESTINAL NEGATIVE: 1

## 2020-06-18 NOTE — PROGRESS NOTES
Negative. Eyes: Negative. Respiratory: Negative. Cardiovascular: Negative. Gastrointestinal: Negative. Genitourinary: Negative. Musculoskeletal: Positive for arthralgias, back pain, gait problem, joint swelling, myalgias, neck pain and neck stiffness. Skin: Negative. Neurological: Positive for weakness and numbness. Psychiatric/Behavioral: Negative. Objective: There were no vitals filed for this visit. Physical Exam  Constitutional:       Appearance: Normal appearance. She is normal weight. HENT:      Nose: Nose normal.   Neurological:      Mental Status: She is alert and oriented to person, place, and time. Psychiatric:         Mood and Affect: Mood normal.         Assessment:     1. Lumbar radiculitis    2. Spinal stenosis of lumbar region with neurogenic claudication    3. Peripheral polyneuropathy    4. 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 Spondylosis of cervical joint with myelopathy    6. Neuropathy    7. Incomplete quadriplegia at C5-C8 level ([address]    8. Chronic pain syndrome    9. Pain syndrome, chronic    10. Other chronic pain            Plan:      · OARRS reviewed. Current MED: 43.50  · Patient was offered naloxone for home. Already prescribed   · Discussed long term side effects of medications, tolerance, dependency and addiction. · Previous UDS reviewed  · UDS preformed today for compliance. · Patient told can not receive any pain medications from any other source. · No evidence of abuse, diversion or aberrant behavior.  Medications and/or procedures to improve function and quality of life- patient understanding with this and that may not be pain free   Discussed with patient about safe storage of medications at home   Discussed possible weaning of medication dosing dependent on treatment/procedure results.     Discussed with patient about risks with procedure including infection, reaction to medication, increased pain,

## 2020-07-06 RX ORDER — GABAPENTIN 800 MG/1
800 TABLET ORAL 3 TIMES DAILY
Qty: 90 TABLET | Refills: 0 | Status: SHIPPED | OUTPATIENT
Start: 2020-07-06 | End: 2020-08-04 | Stop reason: SDUPTHER

## 2020-07-06 RX ORDER — HYDROCODONE BITARTRATE AND ACETAMINOPHEN 5; 325 MG/1; MG/1
1 TABLET ORAL EVERY 8 HOURS PRN
Qty: 90 TABLET | Refills: 0 | Status: SHIPPED | OUTPATIENT
Start: 2020-07-06 | End: 2020-08-04 | Stop reason: SDUPTHER

## 2020-07-06 RX ORDER — MORPHINE SULFATE 15 MG/1
15 TABLET, FILM COATED, EXTENDED RELEASE ORAL EVERY 12 HOURS
Qty: 60 TABLET | Refills: 0 | Status: SHIPPED | OUTPATIENT
Start: 2020-07-06 | End: 2020-08-04 | Stop reason: SDUPTHER

## 2020-07-06 NOTE — TELEPHONE ENCOUNTER
OARRS reviewed. UDS: + for  Gabapentin, Norco, Morphine consistent. Narcan offered: Offered  Last seen: 6/18/2020.  Follow-up: 07/22/2020

## 2020-07-22 ENCOUNTER — OFFICE VISIT (OUTPATIENT)
Dept: PHYSICAL MEDICINE AND REHAB | Age: 55
End: 2020-07-22
Payer: MEDICARE

## 2020-07-22 VITALS
DIASTOLIC BLOOD PRESSURE: 72 MMHG | TEMPERATURE: 96.6 F | BODY MASS INDEX: 30.35 KG/M2 | HEIGHT: 67 IN | SYSTOLIC BLOOD PRESSURE: 106 MMHG | WEIGHT: 193.4 LBS

## 2020-07-22 PROCEDURE — 99213 OFFICE O/P EST LOW 20 MIN: CPT | Performed by: NURSE PRACTITIONER

## 2020-07-22 ASSESSMENT — ENCOUNTER SYMPTOMS
GASTROINTESTINAL NEGATIVE: 1
EYES NEGATIVE: 1
BACK PAIN: 1
RESPIRATORY NEGATIVE: 1

## 2020-07-22 NOTE — PROGRESS NOTES
medications or at its best is 4/10. Last dose of MS ER and Norco and neurontin was today  Drug screen reviewed from 2020 and was appropriate  Pill count was completed today and was appropriate  Patient does have naloxone available at home. Patient has not required use of naloxone at home since last office visit. The patientis allergic to ibuprofen. Past Medical History  Jose Roberto Pradhan  has a past medical history of DM2 (diabetes mellitus, type 2) ([address] Endometriosis, Esophageal stricture, Female bladder prolapse, Former smoker, History of gastric bypass, Hypotension, Iron deficiency anemia, Obesity, Pulmonary nodule, Spinal cord injury, C5-C7 ([address] Vitamin D deficiency, and Wears glasses. Past Surgical History  The patient  has a past surgical history that includes Gastric bypass surgery (); Hysterectomy ();  section, low transverse; Cholecystectomy; Tonsillectomy; Foot surgery (3/3/15); bladder suspension ( 11); Upper gastrointestinal endoscopy ( 15); Esophagus dilation ( 15); Neck surgery (2015); other surgical history (2015); other surgical history (6/10/2016); other surgical history (Left, 2016); Leg Surgery (Left, 2017); other surgical history (10/16/2017); pr njx dx/ther sbst epidural/subarach lumbar/sacral (N/A, 10/16/2017); other surgical history (Bilateral, 2017); Nerve Block Lumb Facet Level 1 Bilateral (Bilateral, 2017); other surgical history (Left, 2018); pr inject anes/steroid foramen lumbar/sacral w img guide ,ea add level (Left, 3/12/2018); pr office/outpt visit,procedure only (N/A, 2018); pr office/outpt visit,procedure only (N/A, 2018); and Facet jt inj cervical (Bilateral, 2018). Family History  This patient's family history includes Cancer in her brother and father; Diabetes in her mother; Heart Disease in her mother; High Blood Pressure in her father.     Social History  Jose Roberto Pradhan  reports that she quit smoking about 19 years ago. She has a 23.00 pack-year smoking history. She has never used smokeless tobacco. She reports that she does not drink alcohol or use drugs. Medications    Current Outpatient Medications:     HYDROcodone-acetaminophen (NORCO) 5-325 MG per tablet, Take 1 tablet by mouth every 8 hours as needed for Pain for up to 30 days. , Disp: 90 tablet, Rfl: 0    gabapentin (NEURONTIN) 800 MG tablet, Take 1 tablet by mouth 3 times daily for 30 days. , Disp: 90 tablet, Rfl: 0    morphine (MS CONTIN) 15 MG extended release tablet, Take 1 tablet by mouth every 12 hours for 30 days. , Disp: 60 tablet, Rfl: 0    naloxone 4 MG/0.1ML LIQD nasal spray, 1 spray by Nasal route as needed for Opioid Reversal, Disp: 1 each, Rfl: 0    busPIRone (BUSPAR) 15 MG tablet, , Disp: , Rfl:     naloxone (NARCAN) 4 MG/0.1ML LIQD nasal spray, 1 spray by Nasal route as needed for Opioid Reversal, Disp: 1 each, Rfl: 0    DULoxetine (CYMBALTA) 60 MG extended release capsule, take 1 capsule by mouth once daily, Disp: 30 capsule, Rfl: 2    naloxone 4 MG/0.1ML LIQD nasal spray, 1 spray by Nasal route as needed for Opioid Reversal, Disp: 1 each, Rfl: 0    DULoxetine (CYMBALTA) 60 MG extended release capsule, take 1 capsule by mouth once daily, Disp: 30 capsule, Rfl: 1    metFORMIN (GLUCOPHAGE) 500 MG tablet, take 1 tablet by mouth twice a day with meals, Disp: 180 tablet, Rfl: 1    triamcinolone (KENALOG) 0.1 % cream, Apply topically 2 times daily. , Disp: 30 g, Rfl: 0    glucose monitoring kit (FREESTYLE) monitoring kit, 1 kit by Does not apply route daily, Disp: 1 kit, Rfl: 0    blood glucose test strips (ASCENSIA AUTODISC VI;ONE TOUCH ULTRA TEST VI) strip, Use with associated glucose meter to test up to once daily. , Disp: 100 strip, Rfl: 11    Ascorbic Acid (VITAMIN C PO), Take by mouth, Disp: , Rfl:     BIOTIN PO, Take by mouth, Disp: , Rfl:     Prenatal Vit-Fe Fumarate-FA (PRENATAL ONE DAILY PO), Take by mouth, Disp: , Rfl:     Multiple Vitamins-Minerals (THERAPEUTIC MULTIVITAMIN-MINERALS) tablet, Take 1 tablet by mouth daily, Disp: , Rfl:     docusate sodium (COLACE) 100 MG capsule, Take 100 mg by mouth as needed for Constipation, Disp: , Rfl:     vitamin B-12 (CYANOCOBALAMIN) 500 MCG tablet, Take 500 mcg by mouth daily, Disp: , Rfl:     iron sucrose (VENOFER) 20 MG/ML injection, Infuse 10 mLs intravenously every 3 days for 5 doses Infuse slowly over at least 5 minutes. , Disp: 50 mL, Rfl: 0    vitamin D (ERGOCALCIFEROL) 77291 units CAPS capsule, Take 1 capsule by mouth once a week for 12 doses, Disp: 12 capsule, Rfl: 0    Subjective:      Review of Systems   Constitutional: Positive for activity change. HENT: Negative. Eyes: Negative. Respiratory: Negative. Cardiovascular: Negative. Gastrointestinal: Negative. Genitourinary: Negative. Musculoskeletal: Positive for arthralgias, back pain, gait problem, joint swelling, myalgias, neck pain and neck stiffness. Skin: Negative. Neurological: Positive for weakness and numbness. Psychiatric/Behavioral: Negative. Objective:     Vitals:    07/22/20 0959   BP: 106/72   Temp: 96.6 °F (35.9 °C)   Weight: 193 lb 6.4 oz (87.7 kg)   Height: 5' 7\" (1.702 m)       Physical Exam  Vitals signs and nursing note reviewed. Constitutional:       General: She is not in acute distress. Appearance: She is well-developed. She is not diaphoretic. HENT:      Head: Normocephalic and atraumatic. Right Ear: External ear normal.      Left Ear: External ear normal.      Nose: Nose normal.      Mouth/Throat:      Pharynx: No oropharyngeal exudate. Eyes:      General: No scleral icterus. Right eye: No discharge. Left eye: No discharge. Conjunctiva/sclera: Conjunctivae normal.      Pupils: Pupils are equal, round, and reactive to light.    Neck:      Musculoskeletal: Full passive range of motion without pain, normal range of motion and neck exhibits tenderness. Right lower leg: She exhibits tenderness and swelling. Left lower leg: She exhibits tenderness and swelling. Right foot: Tenderness and swelling present. Left foot: Decreased range of motion. Tenderness and swelling present. Skin:     General: Skin is warm. Coloration: Skin is not pale. Findings: No erythema or rash. Neurological:      Mental Status: She is alert and oriented to person, place, and time. She is not disoriented. Cranial Nerves: No cranial nerve deficit. Sensory: No sensory deficit. Motor: No atrophy or abnormal muscle tone. Coordination: Coordination normal.      Gait: Gait normal.      Deep Tendon Reflexes: Babinski sign absent on the right side. Reflex Scores:       Tricep reflexes are 1+ on the right side and 1+ on the left side. Bicep reflexes are 1+ on the right side and 1+ on the left side. Brachioradialis reflexes are 1+ on the right side and 1+ on the left side. Patellar reflexes are 2+ on the right side and 2+ on the left side. Achilles reflexes are 2+ on the right side and 2+ on the left side. Comments: SLR -  Motor 3/5 LUE LLE RUE 4/5 RLE 4/5   Psychiatric:         Attention and Perception: She is attentive. Mood and Affect: Mood is not anxious or depressed. Affect is not labile, blunt, angry or inappropriate. Speech: Speech normal.         Behavior: Behavior normal. Behavior is not agitated, slowed, aggressive, withdrawn, hyperactive or combative. Thought Content: Thought content normal. Thought content is not paranoid or delusional. Thought content does not include homicidal or suicidal ideation. Thought content does not include homicidal or suicidal plan. Cognition and Memory: Memory is not impaired. She does not exhibit impaired recent memory or impaired remote memory.          Judgment: Judgment normal. Judgment is not impulsive or

## 2020-08-03 NOTE — TELEPHONE ENCOUNTER
Randy Macedo called requesting a refill on the following medications:  Requested Prescriptions     Pending Prescriptions Disp Refills    HYDROcodone-acetaminophen (NORCO) 5-325 MG per tablet 90 tablet 0     Sig: Take 1 tablet by mouth every 8 hours as needed for Pain for up to 30 days.  morphine (MS CONTIN) 15 MG extended release tablet 60 tablet 0     Sig: Take 1 tablet by mouth every 12 hours for 30 days.  gabapentin (NEURONTIN) 800 MG tablet 90 tablet 0     Sig: Take 1 tablet by mouth 3 times daily for 30 days.      Pharm[facility] [address]      Date of last visit: 7/22/20  Date of next visit (if applicable): 4/27/7583

## 2020-08-04 RX ORDER — GABAPENTIN 800 MG/1
800 TABLET ORAL 3 TIMES DAILY
Qty: 90 TABLET | Refills: 0 | Status: SHIPPED | OUTPATIENT
Start: 2020-08-06 | End: 2020-09-02 | Stop reason: SDUPTHER

## 2020-08-04 RX ORDER — MORPHINE SULFATE 15 MG/1
15 TABLET, FILM COATED, EXTENDED RELEASE ORAL EVERY 12 HOURS
Qty: 60 TABLET | Refills: 0 | Status: SHIPPED | OUTPATIENT
Start: 2020-08-06 | End: 2020-09-02 | Stop reason: SDUPTHER

## 2020-08-04 RX ORDER — HYDROCODONE BITARTRATE AND ACETAMINOPHEN 5; 325 MG/1; MG/1
1 TABLET ORAL EVERY 8 HOURS PRN
Qty: 90 TABLET | Refills: 0 | Status: SHIPPED | OUTPATIENT
Start: 2020-08-04 | End: 2020-09-02 | Stop reason: SDUPTHER

## 2020-08-18 ENCOUNTER — TELEPHONE (OUTPATIENT)
Dept: PHYSICAL MEDICINE AND REHAB | Age: 55
End: 2020-08-18

## 2020-08-18 NOTE — TELEPHONE ENCOUNTER
Edilson Beebe called with [facility] stating that they had questions about the MRI for the patient. They stated that patient is unable to have the Cervical MRI due to the SCS that is still in place and the guidelines for not doing the scanning in that area with the SCS in place. CORI Wagner notified. States to order a CT Cervical. Order placed.    Faxed to # [phone]

## 2020-09-02 ENCOUNTER — TELEPHONE (OUTPATIENT)
Dept: PHYSICAL MEDICINE AND REHAB | Age: 55
End: 2020-09-02

## 2020-09-02 ENCOUNTER — OFFICE VISIT (OUTPATIENT)
Dept: PHYSICAL MEDICINE AND REHAB | Age: 55
End: 2020-09-02
Payer: MEDICARE

## 2020-09-02 VITALS
SYSTOLIC BLOOD PRESSURE: 122 MMHG | BODY MASS INDEX: 30.29 KG/M2 | HEART RATE: 70 BPM | HEIGHT: 67 IN | DIASTOLIC BLOOD PRESSURE: 70 MMHG | WEIGHT: 193 LBS | TEMPERATURE: 96.6 F

## 2020-09-02 PROCEDURE — 99214 OFFICE O/P EST MOD 30 MIN: CPT | Performed by: NURSE PRACTITIONER

## 2020-09-02 RX ORDER — HYDROCODONE BITARTRATE AND ACETAMINOPHEN 5; 325 MG/1; MG/1
1 TABLET ORAL EVERY 8 HOURS PRN
Qty: 90 TABLET | Refills: 0 | Status: SHIPPED | OUTPATIENT
Start: 2020-09-06 | End: 2020-10-06 | Stop reason: SDUPTHER

## 2020-09-02 RX ORDER — GABAPENTIN 800 MG/1
800 TABLET ORAL 3 TIMES DAILY
Qty: 90 TABLET | Refills: 0 | Status: SHIPPED | OUTPATIENT
Start: 2020-09-06 | End: 2020-10-06 | Stop reason: SDUPTHER

## 2020-09-02 RX ORDER — MORPHINE SULFATE 15 MG/1
15 TABLET, FILM COATED, EXTENDED RELEASE ORAL EVERY 12 HOURS
Qty: 60 TABLET | Refills: 0 | Status: SHIPPED | OUTPATIENT
Start: 2020-09-06 | End: 2020-10-06 | Stop reason: SDUPTHER

## 2020-09-02 ASSESSMENT — ENCOUNTER SYMPTOMS
EYES NEGATIVE: 1
GASTROINTESTINAL NEGATIVE: 1
RESPIRATORY NEGATIVE: 1
BACK PAIN: 1

## 2020-09-02 NOTE — TELEPHONE ENCOUNTER
Pt to call with where she will be getting labs, ekg, and covid done as soon as she finds out. Pt stated she will make sure to get phone number for facility to help us retrieve results. Pt notified to get covid swab on 9- to make sure we have results. Expressed understanding. Pt notified without results, we may not be able to do procedure.

## 2020-09-02 NOTE — PROGRESS NOTES
[facility]  [address] [place]  Dept: [phone]  Dept Fax: [phone]: [phone]    Visit Date: 9/2/2020    Functionality Assessment/Goals Worksheet     On a scale of 0 (Does not Interfere) to 10 (Completely Interferes)     1. Which number describes how during the past week pain has interfered with       the following:  A. General Activity:  8  B. Mood: 9  C. Walking Ability:  8  D. Normal Work (Includes both work outside the home and housework):  8  E. Relations with Other People:   10  F. Sleep:   10  G. Enjoyment of Life:   9    2. Patient Prefers to Take their Pain Medications:     [x]  On a regular basis   [x]  Only when necessary    []  Does not take pain medications    3. What are the Patient's Goals/Expectations for Visiting Pain Management? [x]  Learn about my pain    [x]  Receive Medication   [x]  Physical Therapy     []  Treat Depression   [x]  Receive Injections    []  Treat Sleep   []  Deal with Anxiety and Stress   []  Treat Opoid Dependence/Addiction   []  Other:      HPI:   Alissa Lopez is a 63 y.o. female is here today for    Chief Complaint: Neck pain, hand numbness, lower back and leg pain     HPI   1 month FU to review cervical CT scan. Continues to have posterior neck pain with numbness in the hands. Neck pain is constant, stabbing and burning pain. States that this has remained elevated. SCS remains effective for lower back and leg pain   Patient would like to move forward with cervical SCS trial     Medications remain effective     Medications reviewed. Patient denies side effects with medications. Patient states she is taking medications as prescribed. Shedenies receiving pain medications from other sources. She denies any ER visits since last visit. Pain scale with out pain medications or at its worst is 9-10/10.   Pain scale with pain medications or at its best is 4/10. Last dose of MS ER and Neurontin was today  Drug screen reviewed from 2020 and was appropriate  Pill count was completed today and was appropriate  Patient does not have naloxone available at home. Patient has not required use of naloxone at home since last office visit. The patientis allergic to ibuprofen. Past Medical History  Rivera Noriega  has a past medical history of DM2 (diabetes mellitus, type 2) ([facility] [address] Endometriosis, Esophageal stricture, Female bladder prolapse, Former smoker, History of gastric bypass, Hypotension, Iron deficiency anemia, Obesity, Pulmonary nodule, Spinal cord injury, C5-C7 ([address] Vitamin D deficiency, and Wears glasses. Past Surgical History  The patient  has a past surgical history that includes Gastric bypass surgery (); Hysterectomy ();  section, low transverse; Cholecystectomy; Tonsillectomy; Foot surgery (3/3/15); bladder suspension (11); Upper gastrointestinal endoscopy ( 15); Esophagus dilation ( 15); Neck surgery (2015); other surgical history (2015); other surgical history (6/10/2016); other surgical history (Left, 2016); Leg Surgery (Left, 2017); other surgical history (10/16/2017); pr njx dx/ther sbst epidural/subarach lumbar/sacral (N/A, 10/16/2017); other surgical history (Bilateral, 2017); Nerve Block Lumb Facet Level 1 Bilateral (Bilateral, 2017); other surgical history (Left, 2018); pr inject anes/steroid foramen lumbar/sacral w img guide ,ea add level (Left, 3/12/2018); pr office/outpt visit,procedure only (N/A, 2018); pr office/outpt visit,procedure only (N/A, 2018); and Facet jt inj cervical (Bilateral, 2018). Family History  This patient's family history includes Cancer in her brother and father; Diabetes in her mother; Heart Disease in her mother; High Blood Pressure in her father.     Social History  Rivera Noriega  reports that she quit smoking Fumarate-FA (PRENATAL ONE DAILY PO), Take by mouth, Disp: , Rfl:     Multiple Vitamins-Minerals (THERAPEUTIC MULTIVITAMIN-MINERALS) tablet, Take 1 tablet by mouth daily, Disp: , Rfl:     iron sucrose (VENOFER) 20 MG/ML injection, Infuse 10 mLs intravenously every 3 days for 5 doses Infuse slowly over at least 5 minutes. , Disp: 50 mL, Rfl: 0    vitamin D (ERGOCALCIFEROL) 70830 units CAPS capsule, Take 1 capsule by mouth once a week for 12 doses, Disp: 12 capsule, Rfl: 0    docusate sodium (COLACE) 100 MG capsule, Take 100 mg by mouth as needed for Constipation, Disp: , Rfl:     vitamin B-12 (CYANOCOBALAMIN) 500 MCG tablet, Take 500 mcg by mouth daily, Disp: , Rfl:     Subjective:      Review of Systems   Constitutional: Positive for activity change. HENT: Negative. Eyes: Negative. Respiratory: Negative. Cardiovascular: Negative. Gastrointestinal: Negative. Genitourinary: Negative. Musculoskeletal: Positive for arthralgias, back pain, gait problem, joint swelling, myalgias, neck pain and neck stiffness. Skin: Negative. Neurological: Positive for weakness, numbness and headaches. Psychiatric/Behavioral: Negative. Objective:     Vitals:    09/02/20 0809   BP: 122/70   Site: Left Upper Arm   Position: Sitting   Cuff Size: Large Adult   Pulse: 70   Temp: 96.6 °F (35.9 °C)   TempSrc: Temporal   Weight: 193 lb (87.5 kg)   Height: 5' 7\" (1.702 m)       Physical Exam  Vitals signs and nursing note reviewed. Constitutional:       General: She is not in acute distress. Appearance: She is well-developed. She is not diaphoretic. HENT:      Head: Normocephalic and atraumatic. Right Ear: External ear normal.      Left Ear: External ear normal.      Nose: Nose normal.      Mouth/Throat:      Pharynx: No oropharyngeal exudate. Eyes:      General: No scleral icterus. Right eye: No discharge. Left eye: No discharge.       Conjunctiva/sclera: Conjunctivae normal. Pupils: Pupils are equal, round, and reactive to light. Neck:      Musculoskeletal: Full passive range of motion without pain, normal range of motion and neck supple. Normal range of motion. No edema, erythema, neck rigidity or muscular tenderness. Thyroid: No thyromegaly. Cardiovascular:      Rate and Rhythm: Normal rate and regular rhythm. Heart sounds: Normal heart sounds. No murmur. No friction rub. No gallop. Pulmonary:      Effort: Pulmonary effort is normal. No respiratory distress. Breath sounds: Normal breath sounds. No wheezing or rales. Chest:      Chest wall: No tenderness. Abdominal:      General: Bowel sounds are normal. There is no distension. Palpations: Abdomen is soft. Tenderness: There is no abdominal tenderness. There is no guarding or rebound. Musculoskeletal:         General: Tenderness present. Right shoulder: She exhibits decreased range of motion and tenderness. Left shoulder: She exhibits decreased range of motion and tenderness. Right elbow: Tenderness found. Left elbow: Tenderness found. Right wrist: She exhibits tenderness. Left wrist: She exhibits tenderness. Right hip: She exhibits tenderness. Left hip: She exhibits tenderness. Right knee: Tenderness found. Left knee: She exhibits decreased range of motion and swelling. Tenderness found. Right ankle: She exhibits swelling. Tenderness. Left ankle: She exhibits decreased range of motion and swelling. Tenderness. Cervical back: She exhibits decreased range of motion, tenderness, pain and spasm. Thoracic back: She exhibits tenderness. Lumbar back: She exhibits tenderness, pain and spasm. Back:       Right upper arm: She exhibits tenderness. Left upper arm: She exhibits tenderness. Right forearm: She exhibits tenderness. Left forearm: She exhibits tenderness. Right hand: She exhibits tenderness. Decreased strength noted. Left hand: She exhibits tenderness. Decreased strength noted. Right upper leg: She exhibits tenderness. Left upper leg: She exhibits tenderness. Right lower leg: She exhibits tenderness and swelling. Edema present. Left lower leg: She exhibits tenderness and swelling. Edema present. Right foot: Tenderness and swelling present. Left foot: Decreased range of motion. Tenderness and swelling present. Skin:     General: Skin is warm. Coloration: Skin is not pale. Findings: No erythema or rash. Neurological:      Mental Status: She is alert and oriented to person, place, and time. She is not disoriented. Cranial Nerves: No cranial nerve deficit. Sensory: Sensory deficit present. Motor: Weakness present. No atrophy or abnormal muscle tone. Coordination: Coordination normal.      Gait: Gait normal.      Deep Tendon Reflexes: Babinski sign absent on the right side. Reflex Scores:       Tricep reflexes are 1+ on the right side and 1+ on the left side. Bicep reflexes are 1+ on the right side and 1+ on the left side. Brachioradialis reflexes are 1+ on the right side and 1+ on the left side. Patellar reflexes are 2+ on the right side and 2+ on the left side. Achilles reflexes are 2+ on the right side and 2+ on the left side. Comments: SLR -  Motor 3/5 LUE LLE RUE 4/5 RLE 4/5   Psychiatric:         Attention and Perception: She is attentive. Mood and Affect: Mood is not anxious or depressed. Affect is not labile, blunt, angry or inappropriate. Speech: Speech normal.         Behavior: Behavior normal. Behavior is not agitated, slowed, aggressive, withdrawn, hyperactive or combative. Thought Content: Thought content normal. Thought content is not paranoid or delusional. Thought content does not include homicidal or suicidal ideation.  Thought content does not include homicidal or suicidal plan. Cognition and Memory: Memory is not impaired. She does not exhibit impaired recent memory or impaired remote memory. Judgment: Judgment normal. Judgment is not impulsive or inappropriate. DALTON test: + bilaterally   Yeomans test: + bilaterally   Gaenslen test: + bilaterally      Assessment:     1. Cervical radiculopathy    2. Spondylosis, cervical, with myelopathy    3. Neuropathy    4. Lumbar radiculitis    5. Spinal stenosis, lumbar region, with neurogenic claudication    6. Incomplete quadriplegia at C5-C8 level ([facility] [address]    7. Chronic pain syndrome    8. Pain syndrome, chronic    9. Other chronic pain            Plan:      · OARRS reviewed. Current MED: 45.00  · Patient was offered naloxone for home. Has  · Discussed long term side effects of medications, tolerance, dependency and addiction. · Previous UDS reviewed  · UDS preformed today for compliance. · Patient told can not receive any pain medications from any other source. · No evidence of abuse, diversion or aberrant behavior.  Medications and/or procedures to improve function and quality of life- patient understanding with this and that may not be pain free   Discussed with patient about safe storage of medications at home   Discussed possible weaning of medication dosing dependent on treatment/procedure results.  Discussed with patient about risks with procedure including infection, reaction to medication, increased pain, or bleeding. · SCS remains effective for lower back and leg pain  · No relief from C-facet MBB or OLGA in past.  · Reviewed C-xray and C-MRI in detail   · Plan Cervical SCS trial. Procedure and risks discussed in detail with patient. Already cleard from Psych has lumbar SCS  · Continue MS Contin 15 mg BID scheduled for maintenance pain ordered refill.  Norco 5/325 TID prn - ordered refill  · Continue Neurontin 800 TID- ordered refill    Meds.  Prescribed:   Orders Placed This Encounter Medications    HYDROcodone-acetaminophen (NORCO) 5-325 MG per tablet     Sig: Take 1 tablet by mouth every 8 hours as needed for Pain for up to 30 days. Dispense:  90 tablet     Refill:  0     Reduce doses taken as pain becomes manageable    morphine (MS CONTIN) 15 MG extended release tablet     Sig: Take 1 tablet by mouth every 12 hours for 30 days. Dispense:  60 tablet     Refill:  0     Reduce doses taken as pain becomes manageable    gabapentin (NEURONTIN) 800 MG tablet     Sig: Take 1 tablet by mouth 3 times daily for 30 days. Dispense:  90 tablet     Refill:  0       Return for Cervical SCS trial. , follow up after procedure. Time spent with patient was 25 minutes, more than 50% was spent Counseling/coordinated the patient'scare.       Electronically signed by FADUMO Jasso CNP on9/2/2020 at 8:27 AM

## 2020-09-08 NOTE — TELEPHONE ENCOUNTER
Patient is calling in regarding her morphine prescription, her pharmacy told her it needs prior authorized. And she is asking if it can be be authorized for a year if possible? Please advise.

## 2020-09-22 ENCOUNTER — TELEPHONE (OUTPATIENT)
Dept: PHYSICAL MEDICINE AND REHAB | Age: 55
End: 2020-09-22

## 2020-09-22 NOTE — TELEPHONE ENCOUNTER
Per [employer] website Emily Valdez is denied for the patients scheduled procedure (9/11/2020 Referral tab) due to need for a psych. consult within the last 6 months. Prior consult was 2/12/2018  Procedure and follow up cancelled. Pt. Notified. Can you please order a psych. Consult with Dr. Bg Real.  Thanks

## 2020-09-22 NOTE — TELEPHONE ENCOUNTER
LVM for pt. regarding Psych. Consult to Dr. Juliocesar Jain. Phone number given. Advised to call office with any questions.

## 2020-10-02 NOTE — TELEPHONE ENCOUNTER
Mesha Joshi called requesting a refill on the following medications:  Requested Prescriptions     Pending Prescriptions Disp Refills    morphine (MS CONTIN) 15 MG extended release tablet 60 tablet 0     Sig: Take 1 tablet by mouth every 12 hours for 30 days.  HYDROcodone-acetaminophen (NORCO) 5-325 MG per tablet 90 tablet 0     Sig: Take 1 tablet by mouth every 8 hours as needed for Pain for up to 30 days.  gabapentin (NEURONTIN) 800 MG tablet 90 tablet 0     Sig: Take 1 tablet by mouth 3 times daily for 30 days.      Pharm[facility]: [facility] [address], [place]      Date of last visit: 9/2/20  Date of next visit (if applicable): Visit date not found

## 2020-10-06 RX ORDER — HYDROCODONE BITARTRATE AND ACETAMINOPHEN 5; 325 MG/1; MG/1
1 TABLET ORAL EVERY 8 HOURS PRN
Qty: 90 TABLET | Refills: 0 | Status: SHIPPED | OUTPATIENT
Start: 2020-10-06 | End: 2020-11-04 | Stop reason: SDUPTHER

## 2020-10-06 RX ORDER — MORPHINE SULFATE 15 MG/1
15 TABLET, FILM COATED, EXTENDED RELEASE ORAL EVERY 12 HOURS
Qty: 60 TABLET | Refills: 0 | Status: SHIPPED | OUTPATIENT
Start: 2020-10-08 | End: 2020-11-04 | Stop reason: SDUPTHER

## 2020-10-06 RX ORDER — GABAPENTIN 800 MG/1
800 TABLET ORAL 3 TIMES DAILY
Qty: 90 TABLET | Refills: 0 | Status: SHIPPED | OUTPATIENT
Start: 2020-10-06 | End: 2020-11-04 | Stop reason: SDUPTHER

## 2020-11-03 NOTE — TELEPHONE ENCOUNTER
Sole Delacruz called requesting a refill on the following medications:  Requested Prescriptions     Pending Prescriptions Disp Refills    HYDROcodone-acetaminophen (NORCO) 5-325 MG per tablet 90 tablet 0     Sig: Take 1 tablet by mouth every 8 hours as needed for Pain for up to 30 days.  morphine (MS CONTIN) 15 MG extended release tablet 60 tablet 0     Sig: Take 1 tablet by mouth every 12 hours for 30 days.  gabapentin (NEURONTIN) 800 MG tablet 90 tablet 0     Sig: Take 1 tablet by mouth 3 times daily for 30 days. [facility] [address]  . pv      PATIENT IS ASKING IF THE GABAPENTIN CAN BE FOR 90 DAYS OR 3 MONTH SUPPLIES?       Date of last visit: 9/2/2020  Date of next visit (if applicable): Visit date not found

## 2020-11-04 RX ORDER — GABAPENTIN 800 MG/1
800 TABLET ORAL 3 TIMES DAILY
Qty: 90 TABLET | Refills: 0 | Status: SHIPPED | OUTPATIENT
Start: 2020-11-05 | End: 2020-12-04 | Stop reason: SDUPTHER

## 2020-11-04 RX ORDER — HYDROCODONE BITARTRATE AND ACETAMINOPHEN 5; 325 MG/1; MG/1
1 TABLET ORAL EVERY 8 HOURS PRN
Qty: 90 TABLET | Refills: 0 | Status: SHIPPED | OUTPATIENT
Start: 2020-11-05 | End: 2020-12-04 | Stop reason: SDUPTHER

## 2020-11-04 RX ORDER — MORPHINE SULFATE 15 MG/1
15 TABLET, FILM COATED, EXTENDED RELEASE ORAL EVERY 12 HOURS
Qty: 60 TABLET | Refills: 0 | Status: SHIPPED | OUTPATIENT
Start: 2020-11-08 | End: 2020-12-04 | Stop reason: SDUPTHER

## 2020-12-03 ENCOUNTER — TELEPHONE (OUTPATIENT)
Dept: PHYSICAL MEDICINE AND REHAB | Age: 55
End: 2020-12-03

## 2020-12-03 NOTE — TELEPHONE ENCOUNTER
Patient stated that she was supposed to be having a spinal cord procedure/injections that she is required to see a psychiatrist for prior to receiving. She requested to let Pierce Jain know that she is scheduled to have that appt with a psychiatrist on 12/15/2020.   DOLV 09/02/2020

## 2020-12-04 RX ORDER — GABAPENTIN 800 MG/1
800 TABLET ORAL 3 TIMES DAILY
Qty: 270 TABLET | Refills: 0 | Status: SHIPPED | OUTPATIENT
Start: 2020-12-05 | End: 2021-03-08 | Stop reason: SDUPTHER

## 2020-12-04 RX ORDER — HYDROCODONE BITARTRATE AND ACETAMINOPHEN 5; 325 MG/1; MG/1
1 TABLET ORAL EVERY 8 HOURS PRN
Qty: 90 TABLET | Refills: 0 | Status: SHIPPED | OUTPATIENT
Start: 2020-12-06 | End: 2021-01-11 | Stop reason: SDUPTHER

## 2020-12-04 RX ORDER — MORPHINE SULFATE 15 MG/1
15 TABLET, FILM COATED, EXTENDED RELEASE ORAL EVERY 12 HOURS
Qty: 60 TABLET | Refills: 0 | Status: SHIPPED | OUTPATIENT
Start: 2020-12-09 | End: 2021-01-11 | Stop reason: SDUPTHER

## 2020-12-04 NOTE — TELEPHONE ENCOUNTER
OARRS reviewed. UDS: + for  Hydrocodone, Gabapentin, Oxycodone, Morphine -consistent.      Last seen: 9/2/2020    Follow-up:

## 2020-12-14 ENCOUNTER — VIRTUAL VISIT (OUTPATIENT)
Dept: PSYCHIATRY | Age: 55
End: 2020-12-14
Payer: MEDICARE

## 2020-12-14 PROCEDURE — 90791 PSYCH DIAGNOSTIC EVALUATION: CPT | Performed by: PSYCHIATRY & NEUROLOGY
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
## 2021-01-08 DIAGNOSIS — G89.29 OTHER CHRONIC PAIN: ICD-10-CM

## 2021-01-08 DIAGNOSIS — G89.4 PAIN SYNDROME, CHRONIC: ICD-10-CM

## 2021-01-08 RX ORDER — HYDROCODONE BITARTRATE AND ACETAMINOPHEN 5; 325 MG/1; MG/1
1 TABLET ORAL EVERY 8 HOURS PRN
Qty: 90 TABLET | Refills: 0 | Status: CANCELLED | OUTPATIENT
Start: 2021-01-08 | End: 2021-02-07

## 2021-01-08 RX ORDER — MORPHINE SULFATE 15 MG/1
15 TABLET, FILM COATED, EXTENDED RELEASE ORAL EVERY 12 HOURS
Qty: 60 TABLET | Refills: 0 | Status: CANCELLED | OUTPATIENT
Start: 2021-01-08 | End: 2021-02-07

## 2021-01-08 NOTE — TELEPHONE ENCOUNTER
[facility] called requesting a refill on the following medications:    KAYLEN IS WAITING TO GET A PROCEDURE SCHEDULED, TRANSFERRED HER TO [facility] TO SCHEDULE  Requested Prescriptions     Pending Prescriptions Disp Refills    morphine (MS CONTIN) 15 MG extended release tablet 60 tablet 0     Sig: Take 1 tablet by mouth every 12 hours for 30 days.  HYDROcodone-acetaminophen (NORCO) 5-325 MG per tablet 90 tablet 0     Sig: Take 1 tablet by mouth every 8 hours as needed for Pain for up to 30 days.      Pharmacy verified:  [facility]      Date of last visit: 09-02-20  Date of next visit (if applicable): Visit date not found

## 2021-01-11 ENCOUNTER — VIRTUAL VISIT (OUTPATIENT)
Dept: PHYSICAL MEDICINE AND REHAB | Age: 56
End: 2021-01-11
Payer: MEDICARE

## 2021-01-11 DIAGNOSIS — G62.9 NEUROPATHY: ICD-10-CM

## 2021-01-11 DIAGNOSIS — M54.16 LUMBAR RADICULITIS: ICD-10-CM

## 2021-01-11 DIAGNOSIS — G89.29 OTHER CHRONIC PAIN: ICD-10-CM

## 2021-01-11 DIAGNOSIS — M48.062 SPINAL STENOSIS, LUMBAR REGION, WITH NEUROGENIC CLAUDICATION: ICD-10-CM

## 2021-01-11 DIAGNOSIS — G89.4 CHRONIC PAIN SYNDROME: ICD-10-CM

## 2021-01-11 DIAGNOSIS — G89.4 PAIN SYNDROME, CHRONIC: ICD-10-CM

## 2021-01-11 DIAGNOSIS — M47.12 SPONDYLOSIS, CERVICAL, WITH MYELOPATHY: ICD-10-CM

## 2021-01-11 DIAGNOSIS — M54.12 CERVICAL RADICULOPATHY: Primary | ICD-10-CM

## 2021-01-11 DIAGNOSIS — G82.54 INCOMPLETE QUADRIPLEGIA AT C5-C8 LEVEL (HCC): ICD-10-CM

## 2021-01-11 PROCEDURE — 99213 OFFICE O/P EST LOW 20 MIN: CPT | Performed by: NURSE PRACTITIONER

## 2021-01-11 RX ORDER — HYDROCODONE BITARTRATE AND ACETAMINOPHEN 5; 325 MG/1; MG/1
1 TABLET ORAL EVERY 8 HOURS PRN
Qty: 90 TABLET | Refills: 0 | Status: SHIPPED | OUTPATIENT
Start: 2021-01-11 | End: 2021-02-08 | Stop reason: SDUPTHER

## 2021-01-11 RX ORDER — MORPHINE SULFATE 15 MG/1
15 TABLET, FILM COATED, EXTENDED RELEASE ORAL EVERY 12 HOURS
Qty: 60 TABLET | Refills: 0 | Status: SHIPPED | OUTPATIENT
Start: 2021-01-11 | End: 2021-02-08 | Stop reason: SDUPTHER

## 2021-01-11 ASSESSMENT — ENCOUNTER SYMPTOMS
GASTROINTESTINAL NEGATIVE: 1
BACK PAIN: 1
EYES NEGATIVE: 1
RESPIRATORY NEGATIVE: 1

## 2021-01-11 NOTE — PROGRESS NOTES
HPI:   Candice Litten is a 63 y.o. female is here today for    Chief Complaint: Neck pain, hand numbness, low back and leg pain     HPI   Video Visit. TELEHEALTH EVALUATION -- Audio/Visual (During JNAKD-31 public health emergency). This visit was completed virtually using doxy. me. Location of visit: Patients home and providers office. 4 month FU to review psych eval.  Continues to have posterior neck pain with numbness in the hands. Neck pain is constant, stabbing and burning pain. States that this has remained elevated. Patient had psych eval with Dr. Burgess Blue on 2020 and was approved to move forward for SCS trial.   Patient is ready to move forward with Cervical SCS trial      SCS remains effective for lower back and leg pain   Pain medications remain effective   Medications reviewed. Patient denies side effects with medications. Patient states she is taking medications as prescribed. Shedenies receiving pain medications from other sources. She denies any ER visits since last visit. Pain scale with out pain medications or at its worst is 8-10/10. Pain scale with pain medications or at its best is 4-6/10. Last dose of MS ER, Norco and neurontin was today  Drug screen reviewed from 2020 and was appropriate  Pill count was not completed today d/t video visit   Patient does not have naloxone available at home. Patient has not required use of naloxone at home since last office visit. The patientis allergic to ibuprofen. Past Medical History  Mala Scott  has a past medical history of DM2 (diabetes mellitus, type 2) ([address] Endometriosis, Esophageal stricture, Female bladder prolapse, Former smoker, History of gastric bypass, Hypotension, Iron deficiency anemia, Obesity, Pulmonary nodule, Spinal cord injury, C5-C7 ([address] Vitamin D deficiency, and Wears glasses. Past Surgical History  The patient  has a past surgical history that includes Gastric bypass surgery (); Hysterectomy ();   section, low transverse; Cholecystectomy; Tonsillectomy; Foot surgery (3/3/15); bladder suspension (8 2 11); Upper gastrointestinal endoscopy (5 20 15); Esophagus dilation (5 20 15); Neck surgery (12/31/2015); other surgical history (12/31/2015); other surgical history (6/10/2016); other surgical history (Left, 08/29/2016); Leg Surgery (Left, 02/14/2017); other surgical history (10/16/2017); pr njx dx/ther sbst epidural/subarach lumbar/sacral (N/A, 10/16/2017); other surgical history (Bilateral, 12/07/2017); Nerve Block Lumb Facet Level 1 Bilateral (Bilateral, 12/7/2017); other surgical history (Left, 03/12/2018); pr njx aa&/strd tfrml epi lumbar/sacral ea addl (Left, 3/12/2018); pr office/outpt visit,procedure only (N/A, 6/4/2018); pr office/outpt visit,procedure only (N/A, 9/28/2018); and Facet jt inj cervical (Bilateral, 12/18/2018). Family History  This patient's family history includes Cancer in her brother and father; Diabetes in her mother; Heart Disease in her mother; High Blood Pressure in her father. Social History  Lizy Elam  reports that she quit smoking about 20 years ago. She has a 23.00 pack-year smoking history. She has never used smokeless tobacco. She reports that she does not drink alcohol or use drugs. Medications    Current Outpatient Medications:     morphine (MS CONTIN) 15 MG extended release tablet, Take 1 tablet by mouth every 12 hours for 30 days. , Disp: 60 tablet, Rfl: 0    HYDROcodone-acetaminophen (NORCO) 5-325 MG per tablet, Take 1 tablet by mouth every 8 hours as needed for Pain for up to 30 days. , Disp: 90 tablet, Rfl: 0    gabapentin (NEURONTIN) 800 MG tablet, Take 1 tablet by mouth 3 times daily for 90 days. , Disp: 270 tablet, Rfl: 0    naloxone 4 MG/0.1ML LIQD nasal spray, 1 spray by Nasal route as needed for Opioid Reversal, Disp: 1 each, Rfl: 0    busPIRone (BUSPAR) 15 MG tablet, , Disp: , Rfl:     naloxone (NARCAN) 4 MG/0.1ML LIQD nasal spray, 1 spray by Nasal route as needed for Opioid Reversal, Disp: 1 each, Rfl: 0    DULoxetine (CYMBALTA) 60 MG extended release capsule, take 1 capsule by mouth once daily, Disp: 30 capsule, Rfl: 2    naloxone 4 MG/0.1ML LIQD nasal spray, 1 spray by Nasal route as needed for Opioid Reversal, Disp: 1 each, Rfl: 0    DULoxetine (CYMBALTA) 60 MG extended release capsule, take 1 capsule by mouth once daily, Disp: 30 capsule, Rfl: 1    metFORMIN (GLUCOPHAGE) 500 MG tablet, take 1 tablet by mouth twice a day with meals, Disp: 180 tablet, Rfl: 1    triamcinolone (KENALOG) 0.1 % cream, Apply topically 2 times daily. , Disp: 30 g, Rfl: 0    glucose monitoring kit (FREESTYLE) monitoring kit, 1 kit by Does not apply route daily, Disp: 1 kit, Rfl: 0    blood glucose test strips (ASCENSIA AUTODISC VI;ONE TOUCH ULTRA TEST VI) strip, Use with associated glucose meter to test up to once daily. , Disp: 100 strip, Rfl: 11    Ascorbic Acid (VITAMIN C PO), Take by mouth, Disp: , Rfl:     BIOTIN PO, Take by mouth, Disp: , Rfl:     Prenatal Vit-Fe Fumarate-FA (PRENATAL ONE DAILY PO), Take by mouth, Disp: , Rfl:     Multiple Vitamins-Minerals (THERAPEUTIC MULTIVITAMIN-MINERALS) tablet, Take 1 tablet by mouth daily, Disp: , Rfl:     iron sucrose (VENOFER) 20 MG/ML injection, Infuse 10 mLs intravenously every 3 days for 5 doses Infuse slowly over at least 5 minutes. , Disp: 50 mL, Rfl: 0    vitamin D (ERGOCALCIFEROL) 40116 units CAPS capsule, Take 1 capsule by mouth once a week for 12 doses, Disp: 12 capsule, Rfl: 0    docusate sodium (COLACE) 100 MG capsule, Take 100 mg by mouth as needed for Constipation, Disp: , Rfl:     vitamin B-12 (CYANOCOBALAMIN) 500 MCG tablet, Take 500 mcg by mouth daily, Disp: , Rfl:     Subjective:      Review of Systems   Constitutional: Positive for activity change. HENT: Negative. Eyes: Negative. Respiratory: Negative. Cardiovascular: Negative. Gastrointestinal: Negative. Genitourinary: Negative. Musculoskeletal: Positive for arthralgias, back pain, gait problem, joint swelling, myalgias, neck pain and neck stiffness. Skin: Negative. Neurological: Positive for weakness, numbness and headaches. Psychiatric/Behavioral: Positive for sleep disturbance. Objective: There were no vitals filed for this visit. Physical Exam  Constitutional:       General: She is not in acute distress. Appearance: Normal appearance. HENT:      Head: Normocephalic and atraumatic. Neurological:      General: No focal deficit present. Mental Status: She is alert and oriented to person, place, and time. Psychiatric:         Mood and Affect: Mood normal.            Assessment:     1. Cervical radiculopathy    2. Spondylosis, cervical, with myelopathy    3. Neuropathy    4. Lumbar radiculitis    5. Spinal stenosis, lumbar region, with neurogenic claudication    6. Incomplete quadriplegia at C5-C8 level ([facility] [address]    7. Chronic pain syndrome    8. Pain syndrome, chronic    9. Other chronic pain            Plan:      · OARRS reviewed. Current MED: 45.00  · Patient was offered naloxone for home. Has   · Discussed long term side effects of medications, tolerance, dependency and addiction. · Previous UDS reviewed  · UDS preformed today for compliance. · Patient told can not receive any pain medications from any other source. · No evidence of abuse, diversion or aberrant behavior.  Medications and/or procedures to improve function and quality of life- patient understanding with this and that may not be pain free   Discussed with patient about safe storage of medications at home   Discussed possible weaning of medication dosing dependent on treatment/procedure results.  Discussed with patient about risks with procedure including infection, reaction to medication, increased pain, or bleeding.   · SCS remains effective for lower back and leg pain  · No relief from C-facet MBB or OLGA in past.  · Reviewed C-xray and C-MRI in detail   · Reviewed Dr. Lundberg Stacks notes and psych eval and patient cleared for cervical SCS trial   · Plan Cervical SCS trial. Procedure and risks discussed in detail with patient. · Continue MS Contin 15 mg BID scheduled for maintenance pain ordered refill.  Norco 5/325 TID prn - ordered refill  · Continue Neurontin 800 TID- Has plenty   · Ordered CBC, BMP, and 12 lead EKG preop   · Will order updated UDS at SCS trial FU. Patient is complaint. Meds. Prescribed:   Orders Placed This Encounter   Medications    morphine (MS CONTIN) 15 MG extended release tablet     Sig: Take 1 tablet by mouth every 12 hours for 30 days. Dispense:  60 tablet     Refill:  0     Reduce doses taken as pain becomes manageable    HYDROcodone-acetaminophen (NORCO) 5-325 MG per tablet     Sig: Take 1 tablet by mouth every 8 hours as needed for Pain for up to 30 days. Dispense:  90 tablet     Refill:  0     Reduce doses taken as pain becomes manageable       Return for Cervical SCS trial. , follow up after procedure. Time spent with patient was 25 minutes, more than 50% was spent Counseling/coordinated the patient's care.       Electronically signed by FADUMO Velasco CNP on1/11/2021 at 11:45 AM

## 2021-01-12 ENCOUNTER — TELEPHONE (OUTPATIENT)
Dept: PHYSICAL MEDICINE AND REHAB | Age: 56
End: 2021-01-12

## 2021-02-08 DIAGNOSIS — G89.4 PAIN SYNDROME, CHRONIC: ICD-10-CM

## 2021-02-08 DIAGNOSIS — G89.29 OTHER CHRONIC PAIN: ICD-10-CM

## 2021-02-08 RX ORDER — HYDROCODONE BITARTRATE AND ACETAMINOPHEN 5; 325 MG/1; MG/1
1 TABLET ORAL EVERY 8 HOURS PRN
Qty: 90 TABLET | Refills: 0 | Status: SHIPPED | OUTPATIENT
Start: 2021-02-10 | End: 2021-03-15 | Stop reason: SDUPTHER

## 2021-02-08 RX ORDER — MORPHINE SULFATE 15 MG/1
15 TABLET, FILM COATED, EXTENDED RELEASE ORAL EVERY 12 HOURS
Qty: 60 TABLET | Refills: 0 | Status: SHIPPED | OUTPATIENT
Start: 2021-02-10 | End: 2021-03-15 | Stop reason: SDUPTHER

## 2021-02-08 NOTE — TELEPHONE ENCOUNTER
Mary Champagne called requesting a refill on the following medications:  Requested Prescriptions     Pending Prescriptions Disp Refills    morphine (MS CONTIN) 15 MG extended release tablet 60 tablet 0     Sig: Take 1 tablet by mouth every 12 hours for 30 days.  HYDROcodone-acetaminophen (NORCO) 5-325 MG per tablet 90 tablet 0     Sig: Take 1 tablet by mouth every 8 hours as needed for Pain for up to 30 days. [facility] verified:[facility]   . pv      Date of last visit: 1/11/21  Date of next visit (if applicable): Visit date not found

## 2021-03-08 ENCOUNTER — TELEPHONE (OUTPATIENT)
Dept: PHYSICAL MEDICINE AND REHAB | Age: 56
End: 2021-03-08

## 2021-03-08 DIAGNOSIS — G89.29 OTHER CHRONIC PAIN: ICD-10-CM

## 2021-03-08 RX ORDER — GABAPENTIN 800 MG/1
800 TABLET ORAL 3 TIMES DAILY
Qty: 270 TABLET | Refills: 0 | Status: SHIPPED | OUTPATIENT
Start: 2021-03-08 | End: 2021-04-13 | Stop reason: SDUPTHER

## 2021-03-08 NOTE — TELEPHONE ENCOUNTER
Osvaldo April called requesting a refill on the following medications:  Requested Prescriptions     Pending Prescriptions Disp Refills    gabapentin (NEURONTIN) 800 MG tablet 270 tablet 0     Sig: Take 1 tablet by mouth 3 times daily for 90 days.      Pharmacy verified:  .[facility]    90 day Supply    Date of last visit:  01/11/21  Date of next visit (if applicable): 5/14/4907

## 2021-03-08 NOTE — TELEPHONE ENCOUNTER
OARRS reviewed. UDS: + for  Hydrocodone gabapentin morphine consistent. Last seen: 1/11/2021.  Follow-up:   Future Appointments   Date Time Provider Kaleb Camargo   3/15/2021 10:45 AM FADUMO Davalos - CNP N [facility] - [address] [facility]

## 2021-03-09 ENCOUNTER — ANESTHESIA EVENT (OUTPATIENT)
Dept: OPERATING ROOM | Age: 56
End: 2021-03-09
Payer: MEDICARE

## 2021-03-09 ENCOUNTER — HOSPITAL ENCOUNTER (OUTPATIENT)
Age: 56
Setting detail: OUTPATIENT SURGERY
Discharge: HOME | End: 2021-03-09
Attending: PAIN MEDICINE | Admitting: PAIN MEDICINE
Payer: MEDICARE

## 2021-03-09 ENCOUNTER — ANESTHESIA (OUTPATIENT)
Dept: OPERATING ROOM | Age: 56
End: 2021-03-09
Payer: MEDICARE

## 2021-03-09 ENCOUNTER — APPOINTMENT (OUTPATIENT)
Dept: GENERAL RADIOLOGY | Age: 56
End: 2021-03-09
Attending: PAIN MEDICINE
Payer: MEDICARE

## 2021-03-09 VITALS
BODY MASS INDEX: 27.18 KG/M2 | OXYGEN SATURATION: 97 % | SYSTOLIC BLOOD PRESSURE: 153 MMHG | HEART RATE: 60 BPM | WEIGHT: 173.2 LBS | DIASTOLIC BLOOD PRESSURE: 69 MMHG | RESPIRATION RATE: 16 BRPM | HEIGHT: 67 IN | TEMPERATURE: 98.6 F

## 2021-03-09 VITALS
RESPIRATION RATE: 11 BRPM | DIASTOLIC BLOOD PRESSURE: 59 MMHG | OXYGEN SATURATION: 99 % | SYSTOLIC BLOOD PRESSURE: 115 MMHG

## 2021-03-09 LAB — GLUCOSE BLD-MCNC: 110 MG/DL (ref 70–108)

## 2021-03-09 PROCEDURE — 63650 IMPLANT NEUROELECTRODES: CPT | Performed by: PAIN MEDICINE

## 2021-03-09 PROCEDURE — 3600000056 HC PAIN LEVEL 4 BASE: Performed by: PAIN MEDICINE

## 2021-03-09 PROCEDURE — 2500000003 HC RX 250 WO HCPCS: Performed by: NURSE ANESTHETIST, CERTIFIED REGISTERED

## 2021-03-09 PROCEDURE — 7100000010 HC PHASE II RECOVERY - FIRST 15 MIN: Performed by: PAIN MEDICINE

## 2021-03-09 PROCEDURE — 6360000002 HC RX W HCPCS: Performed by: NURSE ANESTHETIST, CERTIFIED REGISTERED

## 2021-03-09 PROCEDURE — 3209999900 FLUORO FOR SURGICAL PROCEDURES

## 2021-03-09 PROCEDURE — 82948 REAGENT STRIP/BLOOD GLUCOSE: CPT

## 2021-03-09 PROCEDURE — C1778 LEAD, NEUROSTIMULATOR: HCPCS | Performed by: PAIN MEDICINE

## 2021-03-09 PROCEDURE — 3600000057 HC PAIN LEVEL 4 ADDL 15 MIN: Performed by: PAIN MEDICINE

## 2021-03-09 PROCEDURE — 7100000011 HC PHASE II RECOVERY - ADDTL 15 MIN: Performed by: PAIN MEDICINE

## 2021-03-09 PROCEDURE — 3700000000 HC ANESTHESIA ATTENDED CARE: Performed by: PAIN MEDICINE

## 2021-03-09 PROCEDURE — 95972 ALYS CPLX SP/PN NPGT W/PRGRM: CPT | Performed by: PAIN MEDICINE

## 2021-03-09 PROCEDURE — 2580000003 HC RX 258: Performed by: NURSE ANESTHETIST, CERTIFIED REGISTERED

## 2021-03-09 PROCEDURE — 2709999900 HC NON-CHARGEABLE SUPPLY: Performed by: PAIN MEDICINE

## 2021-03-09 PROCEDURE — 3700000001 HC ADD 15 MINUTES (ANESTHESIA): Performed by: PAIN MEDICINE

## 2021-03-09 PROCEDURE — 6360000004 HC RX CONTRAST MEDICATION: Performed by: PAIN MEDICINE

## 2021-03-09 PROCEDURE — 2500000003 HC RX 250 WO HCPCS: Performed by: PAIN MEDICINE

## 2021-03-09 PROCEDURE — 6370000000 HC RX 637 (ALT 250 FOR IP): Performed by: PAIN MEDICINE

## 2021-03-09 DEVICE — IMPLANTABLE DEVICE: Type: IMPLANTABLE DEVICE | Status: FUNCTIONAL

## 2021-03-09 DEVICE — 50CM 16 CONTACT TRIAL LEAD KIT
Type: IMPLANTABLE DEVICE | Status: FUNCTIONAL
Brand: INFINION™  16

## 2021-03-09 RX ORDER — PROPOFOL 10 MG/ML
INJECTION, EMULSION INTRAVENOUS PRN
Status: DISCONTINUED | OUTPATIENT
Start: 2021-03-09 | End: 2021-03-09 | Stop reason: SDUPTHER

## 2021-03-09 RX ORDER — CEFAZOLIN SODIUM 1 G/3ML
INJECTION, POWDER, FOR SOLUTION INTRAMUSCULAR; INTRAVENOUS PRN
Status: DISCONTINUED | OUTPATIENT
Start: 2021-03-09 | End: 2021-03-09 | Stop reason: SDUPTHER

## 2021-03-09 RX ORDER — FENTANYL CITRATE 50 UG/ML
INJECTION, SOLUTION INTRAMUSCULAR; INTRAVENOUS PRN
Status: DISCONTINUED | OUTPATIENT
Start: 2021-03-09 | End: 2021-03-09 | Stop reason: SDUPTHER

## 2021-03-09 RX ORDER — LIDOCAINE HYDROCHLORIDE 20 MG/ML
INJECTION, SOLUTION INFILTRATION; PERINEURAL PRN
Status: DISCONTINUED | OUTPATIENT
Start: 2021-03-09 | End: 2021-03-09 | Stop reason: SDUPTHER

## 2021-03-09 RX ORDER — LIDOCAINE HYDROCHLORIDE 10 MG/ML
INJECTION, SOLUTION INFILTRATION; PERINEURAL PRN
Status: DISCONTINUED | OUTPATIENT
Start: 2021-03-09 | End: 2021-03-09 | Stop reason: ALTCHOICE

## 2021-03-09 RX ORDER — HYDROCODONE BITARTRATE AND ACETAMINOPHEN 5; 325 MG/1; MG/1
1 TABLET ORAL ONCE
Status: COMPLETED | OUTPATIENT
Start: 2021-03-09 | End: 2021-03-09

## 2021-03-09 RX ORDER — HYDROCODONE BITARTRATE AND ACETAMINOPHEN 5; 325 MG/1; MG/1
TABLET ORAL
Status: DISCONTINUED
Start: 2021-03-09 | End: 2021-03-09 | Stop reason: HOSPADM

## 2021-03-09 RX ORDER — SODIUM CHLORIDE 9 MG/ML
INJECTION, SOLUTION INTRAVENOUS CONTINUOUS PRN
Status: DISCONTINUED | OUTPATIENT
Start: 2021-03-09 | End: 2021-03-09 | Stop reason: SDUPTHER

## 2021-03-09 RX ADMIN — CEFAZOLIN 2000 MG: 1 INJECTION, POWDER, FOR SOLUTION INTRAMUSCULAR; INTRAVENOUS; PARENTERAL at 11:39

## 2021-03-09 RX ADMIN — HYDROCODONE BITARTRATE AND ACETAMINOPHEN 1 TABLET: 5; 325 TABLET ORAL at 13:13

## 2021-03-09 RX ADMIN — PROPOFOL 30 MG: 10 INJECTION, EMULSION INTRAVENOUS at 11:53

## 2021-03-09 RX ADMIN — SODIUM CHLORIDE: 9 INJECTION, SOLUTION INTRAVENOUS at 11:31

## 2021-03-09 RX ADMIN — PROPOFOL 30 MG: 10 INJECTION, EMULSION INTRAVENOUS at 11:56

## 2021-03-09 RX ADMIN — PROPOFOL 50 MG: 10 INJECTION, EMULSION INTRAVENOUS at 12:33

## 2021-03-09 RX ADMIN — PROPOFOL 50 MG: 10 INJECTION, EMULSION INTRAVENOUS at 12:11

## 2021-03-09 RX ADMIN — PROPOFOL 50 MG: 10 INJECTION, EMULSION INTRAVENOUS at 12:14

## 2021-03-09 RX ADMIN — PROPOFOL 50 MG: 10 INJECTION, EMULSION INTRAVENOUS at 12:35

## 2021-03-09 RX ADMIN — LIDOCAINE HYDROCHLORIDE 3 ML: 20 INJECTION, SOLUTION INFILTRATION; PERINEURAL at 11:46

## 2021-03-09 RX ADMIN — FENTANYL CITRATE 100 MCG: 50 INJECTION, SOLUTION INTRAMUSCULAR; INTRAVENOUS at 11:36

## 2021-03-09 RX ADMIN — PROPOFOL 50 MG: 10 INJECTION, EMULSION INTRAVENOUS at 12:37

## 2021-03-09 RX ADMIN — PROPOFOL 30 MG: 10 INJECTION, EMULSION INTRAVENOUS at 11:50

## 2021-03-09 RX ADMIN — PROPOFOL 50 MG: 10 INJECTION, EMULSION INTRAVENOUS at 12:17

## 2021-03-09 RX ADMIN — PROPOFOL 20 MG: 10 INJECTION, EMULSION INTRAVENOUS at 11:48

## 2021-03-09 RX ADMIN — PROPOFOL 50 MG: 10 INJECTION, EMULSION INTRAVENOUS at 11:46

## 2021-03-09 ASSESSMENT — PAIN SCALES - GENERAL
PAINLEVEL_OUTOF10: 10
PAINLEVEL_OUTOF10: 0

## 2021-03-09 ASSESSMENT — PULMONARY FUNCTION TESTS
PIF_VALUE: 0
PIF_VALUE: 1
PIF_VALUE: 0

## 2021-03-09 ASSESSMENT — PAIN - FUNCTIONAL ASSESSMENT: PAIN_FUNCTIONAL_ASSESSMENT: ACTIVITIES ARE NOT PREVENTED

## 2021-03-09 NOTE — ANESTHESIA POSTPROCEDURE EVALUATION
Department of Anesthesiology  Postprocedure Note    Patient: John Reyes  MRN: [id number]  YOB: 1965  Date of evaluation: 3/9/2021  Time:  2:29 PM     Procedure Summary     Date: 03/09/21 Room / Location: [address] [place] [address] 03 / [address] [facility]    Anesthesia Start: 1038 Anesthesia Stop: 1250    Procedure: Cervical SCS trial (N/A ) Diagnosis: (Cervical radiculopathy)    Surgeons: Zahra Alejandre MD Responsible Provider: Jose Maria Hoffman MD    Anesthesia Type: MAC ASA Status: 2          Anesthesia Type: MAC    Jose Phase I:      Jose Phase II: Jose Score: 9    Last vitals: Reviewed and per EMR flowsheets.        Anesthesia Post Evaluation    Patient location during evaluation: PACU  Level of consciousness: awake  Complications: no  Respiratory status: acceptable

## 2021-03-09 NOTE — OP NOTE
Operative Note        Pre-Procedure Note    Patient Name: Gloria Cuellar   YOB: 1965  Medical Record Number: [id number]  Date: 3/9/21    Indication:   Cervical radiculopathy, Cervical fusion hx, Incomplete quadriplegia at C5-C8, Chronic Pain Syndrome    Consent: On file. Vital Signs:   Vitals:    21 1034   BP: (!) 160/74   Pulse: 62   Resp: 16   Temp: 97 °F (36.1 °C)   SpO2: 95%       Past Medical History:   has a past medical history of DM2 (diabetes mellitus, type 2) ([facility] [address] Endometriosis, Esophageal stricture, Female bladder prolapse, Former smoker, History of gastric bypass, Hypotension, Iron deficiency anemia, Obesity, Pulmonary nodule, Spinal cord injury, C5-C7 ([facility] [address] Vitamin D deficiency, and Wears glasses. Past Surgical History:   has a past surgical history that includes Gastric bypass surgery (); Hysterectomy ();  section, low transverse; Cholecystectomy; Tonsillectomy; Foot surgery (3/3/15); bladder suspension ( 2 11); Upper gastrointestinal endoscopy ( 15); Esophagus dilation ( 15); Neck surgery (2015); other surgical history (2015); other surgical history (6/10/2016); other surgical history (Left, 2016); Leg Surgery (Left, 2017); other surgical history (10/16/2017); pr njx dx/ther sbst epidural/subarach lumbar/sacral (N/A, 10/16/2017); other surgical history (Bilateral, 2017); Nerve Block Lumb Facet Level 1 Bilateral (Bilateral, 2017); other surgical history (Left, 2018); pr njx aa&/strd tfrml epi lumbar/sacral ea addl (Left, 3/12/2018); pr office/outpt visit,procedure only (N/A, 2018); pr office/outpt visit,procedure only (N/A, 2018); Facet jt inj cervical (Bilateral, 2018); and Hammer toe surgery (Right, 2020). Pre-Sedation Documentation and Exam:   Vital signs have been reviewed (see flow sheet for vitals).      Sedation/ Anesthesia Plan:   MAC    Patient is an appropriate candidate for plan of sedation: yes      Preoperative Diagnosis:     1-Cervical radiculopathy. 2- Cervical fusion Hx, 3-Incomplete quadriplegia 4-Chronic Pain Syndrome    Post-Op Dx: as above      Procedure Done:     PROCEDURE PERFORMED: TWO LEAD CERVICAL SCS TRIAL    1- AP Film or Thoracolumbar Spine w/ fluoroscopy guidance and interpretation  2- Placement of specialized epidural needle to the-T-2 interspace  3- Placement of  lead withSingle Chamber 8 electrodes to the C-2. Placement of a second lead with dual 8 electrodes to the C-2area  4- Intra-operative  programming of lead taking 30 minutes. 5- Complex programming in recovery taking 30 minutes. Indication for the Procedure: The patient is an established patient with the above known diagnosis. The patient understands the reason for the procedure along with the risks, benefits and alternatives available. The patient has had the opportunity to ask questions prior to the procedure, and the patient has given written, informed, consent to proceed with the procedure. An informed consent was obtained from the patient for the procedure. Medical Necessity: This patient has chronic pain that has failed to respond to conservative measures as outlined in the original history and physical exam on the patients symptoms include low back pain and disability of a moderate to severe degree with intermittent leg pain. The goals of treatment are to 1) achieve optimal pain control, recognizing that a pain-free state may not be achievable; 2) minimize adverse outcomes; 3) enhance functional abilities, and physical and psychological well-being; and 4) enhance the quality of life for patients with chronic pain. Procedure:    After starting IV and applying monitors patient was given 2 g of Ancef IV for antibiotic prophylaxis.  Patient's vital signs including blood pressure pulse oximetry and pulse rate were monitored throughout the procedure and a meaningful communication was kept up with the patient. The patient placed in a prone position. The back was prepped and draped in the usual sterile fashion and strict aseptic precautions were observed throughout the procedure. .  After confirmation of vertebral count through thorocolumbar films , the C-arm was used to help define the angle and access point for the epidural needle. Prior to needle placement, and after location of the access point, under fluoroscopic guidance T-2 area was identified, 15 mL of 1% xylocaine total was used to anesthetize the skin . A 6 inch 14-gauge curved tip  needle was inserted through the skin under fluoroscopic guidance until it got to the epidural space with loss of resistance with air. General aspiration was performed and it was negative for CSF or any blood. This was confirmed with AP and lateral views of the fluoroscopy . After negative aspiration injected 4.5 cc in divided doses of Omnipaque with adequate spread. An 8 Lead(Octrode) electrode Linear lead was channeled through the epidural needle and guided carefully so that the top electrode C-2, on the midline. This placement gave appropriate stimulation. Various program settings were worked through within the operating room, to ensure appropriate coverage with the stimulation. Minor adjustments were made as needed to develop good coverage. These same steps were followed to place an  lead midline to the left of the first lead. .  And the top  Electrode Is paced at C-2  The patient reporting a Lacourse was confirmed with fluoroscopy with AP and lateral views. Under fluoroscopic guidance the Epidural needle and the Stylus were pulled making sure that the electrodes had not moved. Both leads were anchored using the tapered anchor Provided and the anchors were sutured to the skin using 2-0 silk. .  Again confirmed appropriate stimulation. .  The electrode and sit insertion sites were dressed  with 4 x 4 gauze and tegaderm.     The patient was then taken from the procedure room via stretcher, and placed in a recovery room. Once in the recovery room, the fine tune adjustments were made to the stimulation pattern. This additional programming was needed to give the patient optimal coverage during the trial.  Post operatively the patient was monitored until stable. Following the procedure the patient's vital signs were stable. Patient was given adequate instructions regarding the use of the stimulator. Patient will be seen again in the pain clinic in about 5 days for follow-up and further planning. Patient is asked to call us if he develops any signs of infection are any excessive drainage. SURGEON:  Socorro Sheffield MD  ANESTHESIA:   MAC  COMPLICATIONS:  None. SPECIMENS REMOVED:  None. ANTIBIOTICS:  TWO gram of Ancef. ESTIMATED BLOOD LOSS:  5 mL.      Electronically signed by Adalberto Burt MD on 3/9/21 at 12:51 PM EST

## 2021-03-09 NOTE — PROGRESS NOTES
1253: Patient to phase 2 recovery room via cart. Patient is drowsy but arouses easily to name. Report received from surgical RN, Puma De La Torre. Patient's vitals obtained, see charting. 1255: IV is infusing. Dressing to her upper back is clean, dry and intact. Warming device attached. 1257: Offered drink and snack provided to the patient. Bed is in the lowest position and call light is within reach. 1258: Patient's  called at this time. 1305: Patient's  brought to the room. 1309: Patient stating her back is hurting where the stimulator is. 1310: Dr. Yeni Christensen called at this time- see orders. 1313: Norco given for a pain level of \"9\"/10 and denying nausea. - see MAR. More crackers given. 1320: El with [employer] is in with the patient at this time. 1334: Dr. Yeni Chritsensen is OK with seeing the patient on March 15th- which is 6 days post op. 1410: Allen Crabtree is done with the patient and the patient stating she is ready to go home. 1412: IV removed at this time- no complications and dressing applied. Patient is rating pain a \"6\"/10 and denying nausea. 1419: Discharge instructions given and explained to the patient and the patient's , both verbalized understanding. 1421: Patient to the bathroom at this time, gait steady. 1424: Patient ambulated to the car and discharged home in stable condition with her .

## 2021-03-09 NOTE — ANESTHESIA PRE PROCEDURE
fracture of sixth cervical vertebra (HCC) S12.500A    Contusion of chest wall S20.219A    Closed fracture of fifth cervical vertebra ([facility]) S12.400A    Incomplete quadriplegia at C5-C8 level ([facility]) G82.54    Seroma, post-traumatic ([facility]) T79. 2XXA    Traumatic seroma of left thigh ([facility] [address] T79. 2XXA    Iron deficiency anemia D50.9    Spinal stenosis of lumbar region with neurogenic claudication M48.062    Lumbar radiculitis M54.16    Depression F32.9    Chronic pain syndrome G89.4    Lumbar radiculopathy M54.16    Lumbar spondylosis M47.816    Peripheral polyneuropathy G62.9    Spondylosis of cervical region without myelopathy or radiculopathy M47.812       Past Medical History:        Diagnosis Date    DM2 (diabetes mellitus, type 2) ([facility])     Endometriosis     resolved after hysto    Esophageal stricture     Female bladder prolapse     had sling surgery    Former smoker     History of gastric bypass 2010    Lost 130 lbs.  Hypotension     Iron deficiency anemia     Obesity     Pulmonary nodule     Awaiting records, pt not sure when next CT due. Hasn't grown in years.     Spinal cord injury, C5-C7 ([facility] [address]     Vitamin D deficiency     Wears glasses        Past Surgical History:        Procedure Laterality Date    BLADDER SUSPENSION  8 2 11     SECTION, LOW TRANSVERSE      x3    CHOLECYSTECTOMY      ESOPHAGEAL DILATATION  5 20 15    See media tab    FACET JOINT INJECTION, CERVICAL Bilateral 2018    CERVICAL FACET JOINT bilateral C-facet MBB @ C2-3, C3-4, and C4-5 performed by Greg Cardenas MD at [address] [facility]  3/3/15    Dr. Jeramy Faustin Right 2020    HYSTERECTOMY  1997    Endometriosis    LEG SURGERY Left 2017    left thigh    NECK SURGERY  2015    ACDF C6-7    NERVE BLOCK LUMB FACET LEVEL 1 BILATERAL Bilateral 2017    LUMBAR FACET MBB L3-4, L4-5, L5-S1 BILATERAL 03/09/21 (!) 160/74   09/02/20 122/70   07/22/20 106/72       NPO Status: Time of last liquid consumption: 1800                        Time of last solid consumption: 1800                        Date of last liquid consumption: 03/08/21                        Date of last solid food consumption: 03/08/21    BMI:   Wt Readings from Last 3 Encounters:   03/09/21 173 lb 3.2 oz (78.6 kg)   09/02/20 193 lb (87.5 kg)   07/22/20 193 lb 6.4 oz (87.7 kg)     Body mass index is 27.13 kg/m². CBC:   Lab Results   Component Value Date    WBC 5.9 06/13/2019    RBC 4.56 06/13/2019    HGB 12.6 06/13/2019    HCT 39.8 06/13/2019    MCV 87.3 06/13/2019    RDW 14.6 10/22/2017     06/13/2019       CMP:   Lab Results   Component Value Date     06/13/2019    K 4.1 06/13/2019     06/13/2019    CO2 30 06/13/2019    BUN 12 06/13/2019    CREATININE 0.5 06/13/2019    LABGLOM >90 06/13/2019    GLUCOSE 118 06/13/2019    PROT 7.0 06/13/2019    CALCIUM 9.7 06/13/2019    BILITOT 0.5 06/13/2019    ALKPHOS 101 06/13/2019    AST 14 06/13/2019    ALT 16 06/13/2019       POC Tests: No results for input(s): POCGLU, POCNA, POCK, POCCL, POCBUN, POCHEMO, POCHCT in the last 72 hours.     Coags:   Lab Results   Component Value Date    INR 0.87 09/10/2018    APTT 34.1 09/10/2018       HCG (If Applicable): No results found for: PREGTESTUR, PREGSERUM, HCG, HCGQUANT     ABGs: No results found for: PHART, PO2ART, HWG0JVY, CPG2KJN, BEART, S8CKLFNX     Type & Screen (If Applicable):  Lab Results   Component Value Date    LABRH POS 12/31/2015       Drug/Infectious Status (If Applicable):  No results found for: HIV, HEPCAB    COVID-19 Screening (If Applicable): No results found for: COVID19      Anesthesia Evaluation    Airway: Mallampati: II       Mouth opening: > = 3 FB Dental:          Pulmonary:       (-) COPD                           Cardiovascular:                      Neuro/Psych:   (+) neuromuscular disease:, psychiatric history: GI/Hepatic/Renal:             Endo/Other:    (+) Diabetes, . Abdominal:           Vascular:                                        Anesthesia Plan      MAC     ASA 2             Anesthetic plan and risks discussed with patient. Plan discussed with CRNA.                   Ly Fong MD   3/9/2021

## 2021-03-09 NOTE — H&P
[facility] [address]  History and Physical Update    Pt Name: Fred Ruiz  MRN: [id number]  YOB: 1965  Date of evaluation: 3/9/2021      I have examined the patient and reviewed the H&P/Consult and there are no changes to the patient or plans.         Electronically signed by Charles Culp MD on 3/9/2021 at 9:49 AM

## 2021-03-09 NOTE — H&P
H&P     FU to review psych mila.  Continues to have posterior neck pain with numbness in the hands. Neck pain is constant, stabbing and burning pain. States that this has remained elevated. Patient had psych eval with Dr. Jose Richter on 2020 and was approved to move forward for SCS trial.   Patient is ready to move forward with Cervical SCS trial       SCS remains effective for lower back and leg pain   Pain medications remain effective   Medications reviewed. Patient denies side effects with medications. Patient states she is taking medications as prescribed. Shedenies receiving pain medications from other sources. She denies any ER visits since last visit.     Pain scale with out pain medications or at its worst is 8-10/10. Pain scale with pain medications or at its best is 4-6/10. Last dose of MS ER, Norco and neurontin was today  Drug screen reviewed from 2020 and was appropriate  Pill count was not completed today d/t video visit   Patient does not have naloxone available at home. Patient has not required use of naloxone at home since last office visit.         The patientis allergic to ibuprofen.     Past Medical History  Nory Yanez  has a past medical history of DM2 (diabetes mellitus, type 2) ([address] Endometriosis, Esophageal stricture, Female bladder prolapse, Former smoker, History of gastric bypass, Hypotension, Iron deficiency anemia, Obesity, Pulmonary nodule, Spinal cord injury, C5-C7 ([address] Vitamin D deficiency, and Wears glasses.     Past Surgical History  The patient  has a past surgical history that includes Gastric bypass surgery (); Hysterectomy ();  section, low transverse; Cholecystectomy; Tonsillectomy; Foot surgery (3/3/15); bladder suspension (11); Upper gastrointestinal endoscopy (5 20 15); Esophagus dilation (5 20 15); Neck surgery (2015); other surgical history (2015); other surgical history (6/10/2016); other surgical history (Left, 2016);  Leg Surgery (Left, 02/14/2017); other surgical history (10/16/2017); pr njx dx/ther sbst epidural/subarach lumbar/sacral (N/A, 10/16/2017); other surgical history (Bilateral, 12/07/2017); Nerve Block Lumb Facet Level 1 Bilateral (Bilateral, 12/7/2017); other surgical history (Left, 03/12/2018); pr njx aa&/strd tfrml epi lumbar/sacral ea addl (Left, 3/12/2018); pr office/outpt visit,procedure only (N/A, 6/4/2018); pr office/outpt visit,procedure only (N/A, 9/28/2018); and Facet jt inj cervical (Bilateral, 12/18/2018).    Family History  This patient's family history includes Cancer in her brother and father; Diabetes in her mother; Heart Disease in her mother; High Blood Pressure in her father.  Teresita Reddy  reports that she quit smoking about 20 years ago. She has a 23.00 pack-year smoking history. She has never used smokeless tobacco. She reports that she does not drink alcohol or use drugs.     Medications    Current Medication      Current Outpatient Medications:     morphine (MS CONTIN) 15 MG extended release tablet, Take 1 tablet by mouth every 12 hours for 30 days. , Disp: 60 tablet, Rfl: 0    HYDROcodone-acetaminophen (NORCO) 5-325 MG per tablet, Take 1 tablet by mouth every 8 hours as needed for Pain for up to 30 days. , Disp: 90 tablet, Rfl: 0    gabapentin (NEURONTIN) 800 MG tablet, Take 1 tablet by mouth 3 times daily for 90 days. , Disp: 270 tablet, Rfl: 0    naloxone 4 MG/0.1ML LIQD nasal spray, 1 spray by Nasal route as needed for Opioid Reversal, Disp: 1 each, Rfl: 0    busPIRone (BUSPAR) 15 MG tablet, , Disp: , Rfl:     naloxone (NARCAN) 4 MG/0.1ML LIQD nasal spray, 1 spray by Nasal route as needed for Opioid Reversal, Disp: 1 each, Rfl: 0    DULoxetine (CYMBALTA) 60 MG extended release capsule, take 1 capsule by mouth once daily, Disp: 30 capsule, Rfl: 2    naloxone 4 MG/0.1ML LIQD nasal spray, 1 spray by Nasal route as needed for Opioid Reversal, Disp: 1 each, Rfl: 0    DULoxetine (CYMBALTA) 60 MG extended release capsule, take 1 capsule by mouth once daily, Disp: 30 capsule, Rfl: 1    metFORMIN (GLUCOPHAGE) 500 MG tablet, take 1 tablet by mouth twice a day with meals, Disp: 180 tablet, Rfl: 1    triamcinolone (KENALOG) 0.1 % cream, Apply topically 2 times daily. , Disp: 30 g, Rfl: 0    glucose monitoring kit (FREESTYLE) monitoring kit, 1 kit by Does not apply route daily, Disp: 1 kit, Rfl: 0    blood glucose test strips (ASCENSIA AUTODISC VI;ONE TOUCH ULTRA TEST VI) strip, Use with associated glucose meter to test up to once daily. , Disp: 100 strip, Rfl: 11    Ascorbic Acid (VITAMIN C PO), Take by mouth, Disp: , Rfl:     BIOTIN PO, Take by mouth, Disp: , Rfl:     Prenatal Vit-Fe Fumarate-FA (PRENATAL ONE DAILY PO), Take by mouth, Disp: , Rfl:     Multiple Vitamins-Minerals (THERAPEUTIC MULTIVITAMIN-MINERALS) tablet, Take 1 tablet by mouth daily, Disp: , Rfl:     iron sucrose (VENOFER) 20 MG/ML injection, Infuse 10 mLs intravenously every 3 days for 5 doses Infuse slowly over at least 5 minutes. , Disp: 50 mL, Rfl: 0    vitamin D (ERGOCALCIFEROL) 78644 units CAPS capsule, Take 1 capsule by mouth once a week for 12 doses, Disp: 12 capsule, Rfl: 0    docusate sodium (COLACE) 100 MG capsule, Take 100 mg by mouth as needed for Constipation, Disp: , Rfl:     vitamin B-12 (CYANOCOBALAMIN) 500 MCG tablet, Take 500 mcg by mouth daily, Disp: , Rfl:         Subjective:      Review of Systems   Constitutional: Positive for activity change. HENT: Negative. Eyes: Negative. Respiratory: Negative. Cardiovascular: Negative. Gastrointestinal: Negative. Genitourinary: Negative. Musculoskeletal: Positive for arthralgias, back pain, gait problem, joint swelling, myalgias, neck pain and neck stiffness. Skin: Negative. Neurological: Positive for weakness, numbness and headaches.    Psychiatric/Behavioral: Positive for sleep disturbance.         Objective:      Vitals   There were - ordered refill  · Continue Neurontin 800 TID- Has plenty   · Ordered CBC, BMP, and 12 lead EKG preop   · Will order updated UDS at SCS trial FU.  Patient is complaint.               Return for Cervical SCS trial. , follow up after procedure.

## 2021-03-15 ENCOUNTER — OFFICE VISIT (OUTPATIENT)
Dept: PHYSICAL MEDICINE AND REHAB | Age: 56
End: 2021-03-15
Payer: MEDICARE

## 2021-03-15 VITALS
HEIGHT: 67 IN | WEIGHT: 177 LBS | DIASTOLIC BLOOD PRESSURE: 84 MMHG | TEMPERATURE: 97 F | SYSTOLIC BLOOD PRESSURE: 138 MMHG | BODY MASS INDEX: 27.78 KG/M2

## 2021-03-15 DIAGNOSIS — G82.54 INCOMPLETE QUADRIPLEGIA AT C5-C8 LEVEL (HCC): ICD-10-CM

## 2021-03-15 DIAGNOSIS — M47.12 SPONDYLOSIS, CERVICAL, WITH MYELOPATHY: ICD-10-CM

## 2021-03-15 DIAGNOSIS — G89.4 PAIN SYNDROME, CHRONIC: ICD-10-CM

## 2021-03-15 DIAGNOSIS — G62.9 NEUROPATHY: ICD-10-CM

## 2021-03-15 DIAGNOSIS — G89.4 CHRONIC PAIN SYNDROME: ICD-10-CM

## 2021-03-15 DIAGNOSIS — M54.12 CERVICAL RADICULOPATHY: Primary | ICD-10-CM

## 2021-03-15 DIAGNOSIS — M54.16 LUMBAR RADICULITIS: ICD-10-CM

## 2021-03-15 DIAGNOSIS — G89.29 OTHER CHRONIC PAIN: ICD-10-CM

## 2021-03-15 DIAGNOSIS — M48.062 SPINAL STENOSIS, LUMBAR REGION, WITH NEUROGENIC CLAUDICATION: ICD-10-CM

## 2021-03-15 PROCEDURE — 99204 OFFICE O/P NEW MOD 45 MIN: CPT | Performed by: NURSE PRACTITIONER

## 2021-03-15 RX ORDER — MORPHINE SULFATE 15 MG/1
15 TABLET, FILM COATED, EXTENDED RELEASE ORAL EVERY 12 HOURS
Qty: 60 TABLET | Refills: 0 | Status: SHIPPED | OUTPATIENT
Start: 2021-03-15 | End: 2021-04-13 | Stop reason: SDUPTHER

## 2021-03-15 RX ORDER — HYDROCODONE BITARTRATE AND ACETAMINOPHEN 5; 325 MG/1; MG/1
1 TABLET ORAL EVERY 8 HOURS PRN
Qty: 90 TABLET | Refills: 0 | Status: SHIPPED | OUTPATIENT
Start: 2021-03-15 | End: 2021-04-13 | Stop reason: SDUPTHER

## 2021-03-15 ASSESSMENT — ENCOUNTER SYMPTOMS
RESPIRATORY NEGATIVE: 1
GASTROINTESTINAL NEGATIVE: 1
EYES NEGATIVE: 1
BACK PAIN: 1

## 2021-03-15 NOTE — PROGRESS NOTES
[address] [facility] [address]  Dept: [phone]  Dept Fax: [phone]: [phone]    Visit Date: 3/15/2021    Functionality Assessment/Goals Worksheet     On a scale of 0 (Does not Interfere) to 10 (Completely Interferes)     1. Which number describes how during the past week pain has interfered with       the following:  A. General Activity:  8  B. Mood: 8  C. Walking Ability:  9  D. Normal Work (Includes both work outside the home and housework):  8  E. Relations with Other People:   8  F. Sleep:   10  G. Enjoyment of Life:   10    2. Patient Prefers to Take their Pain Medications:     [x]  On a regular basis   [x]  Only when necessary    []  Does not take pain medications    3. What are the Patient's Goals/Expectations for Visiting Pain Management? []  Learn about my pain    [x]  Receive Medication   []  Physical Therapy     []  Treat Depression   [x]  Receive Injections    []  Treat Sleep   []  Deal with Anxiety and Stress   []  Treat Opoid Dependence/Addiction   []  Other:      HPI:   Candice Litten is a 63 y.o. female is here today for    Chief Complaint: Neck pain, hand numbness, low back pain, leg pain     HPI   FU from Cervical SCS trial from 3/9/2021. Receiving 85% relief of hand numbness and about 50% to 60% relief of neck pain with Cervical SCS. Less pain with activity. Patient wants to move forward with permanent SCS. Continues to have posterior neck pain with numbness in the hands- more tolerable with SCS trial.     SCS remains effective for lower back and leg pain   Current pain medications remain effective   Medications reviewed. Patient denies side effects with medications. Patient states she is taking medications as prescribed. Shedenies receiving pain medications from other sources. She denies any ER visits since last visit.     Pain scale with out pain medications or at its worst is 7-8/10. Pain scale with pain medications or at its best is 4/10. Last dose of MS ER and Norco and neurontin was today  Drug screen reviewed from 7/22/2020 and was appropriate  Pill count was completed today and was appropriate  Patient does not have naloxone available at home. Patient has not required use of naloxone at home since last office visit. The patientis allergic to ibuprofen. Subjective:      Review of Systems   Constitutional: Positive for activity change. HENT: Negative. Eyes: Negative. Respiratory: Negative. Cardiovascular: Negative. Gastrointestinal: Negative. Genitourinary: Negative. Musculoskeletal: Positive for arthralgias, back pain, gait problem, joint swelling, myalgias, neck pain and neck stiffness. Skin: Negative. Neurological: Positive for weakness, numbness and headaches. Psychiatric/Behavioral: Positive for sleep disturbance. Objective:     Vitals:    03/15/21 1041   BP: 138/84   Temp: 97 °F (36.1 °C)   Weight: 177 lb (80.3 kg)   Height: 5' 7\" (1.702 m)       Physical Exam  Vitals signs and nursing note reviewed. Constitutional:       General: She is not in acute distress. Appearance: She is well-developed. She is not diaphoretic. HENT:      Head: Normocephalic and atraumatic. Right Ear: External ear normal.      Left Ear: External ear normal.      Nose: Nose normal.      Mouth/Throat:      Pharynx: No oropharyngeal exudate. Eyes:      General: No scleral icterus. Right eye: No discharge. Left eye: No discharge. Conjunctiva/sclera: Conjunctivae normal.      Pupils: Pupils are equal, round, and reactive to light. Neck:      Musculoskeletal: Full passive range of motion without pain, normal range of motion and neck supple. Normal range of motion. No edema, erythema, neck rigidity or muscular tenderness. Thyroid: No thyromegaly.    Cardiovascular:      Rate and Rhythm: Normal rate and regular rhythm. Heart sounds: Normal heart sounds. No murmur. No friction rub. No gallop. Pulmonary:      Effort: Pulmonary effort is normal. No respiratory distress. Breath sounds: Normal breath sounds. No wheezing or rales. Chest:      Chest wall: No tenderness. Abdominal:      General: Bowel sounds are normal. There is no distension. Palpations: Abdomen is soft. Tenderness: There is no abdominal tenderness. There is no guarding or rebound. Musculoskeletal:         General: Tenderness present. Right shoulder: She exhibits decreased range of motion and tenderness. Left shoulder: She exhibits decreased range of motion and tenderness. Right elbow: Tenderness found. Left elbow: Tenderness found. Right wrist: She exhibits tenderness. Left wrist: She exhibits tenderness. Right hip: She exhibits tenderness. Left hip: She exhibits tenderness. Right knee: Tenderness found. Left knee: She exhibits decreased range of motion and swelling. Tenderness found. Right ankle: She exhibits swelling. Tenderness. Left ankle: She exhibits decreased range of motion and swelling. Tenderness. Cervical back: She exhibits decreased range of motion, tenderness, pain and spasm. Thoracic back: She exhibits tenderness. Lumbar back: She exhibits tenderness, pain and spasm. Back:       Right upper arm: She exhibits tenderness. Left upper arm: She exhibits tenderness. Right forearm: She exhibits tenderness. Left forearm: She exhibits tenderness. Right hand: She exhibits tenderness. Decreased strength noted. Left hand: She exhibits tenderness. Decreased strength noted. Right upper leg: She exhibits tenderness. Left upper leg: She exhibits tenderness. Right lower leg: She exhibits tenderness and swelling. Edema present. Left lower leg: She exhibits tenderness and swelling.  Edema Neuropathy    4. Lumbar radiculitis    5. Spinal stenosis, lumbar region, with neurogenic claudication    6. Incomplete quadriplegia at C5-C8 level ([address]    7. Chronic pain syndrome    8. Pain syndrome, chronic    9. Other chronic pain            Plan:      · OARRS reviewed. Current MED: 45.00  · Patient was offered naloxone for home. · Discussed long term side effects of medications, tolerance, dependency and addiction. · Previous UDS reviewed  · UDS preformed today for compliance. · Patient told can not receive any pain medications from any other source. · No evidence of abuse, diversion or aberrant behavior.  Medications and/or procedures to improve function and quality of life- patient understanding with this and that may not be pain free   Discussed with patient about safe storage of medications at home   Discussed possible weaning of medication dosing dependent on treatment/procedure results.  Discussed with patient about risks with procedure including infection, reaction to medication, increased pain, or bleeding.  Procedure notes reveiwed    Receiving 85% relief of hand numbness and about 50% to 60% relief of neck pain with Cervical SCS trial   Plan permanent SCS placement- referral to Dr. Liz Albarran.  Patient tolerated removal of cervical SCS trial    · Continue MS Contin 15 mg BID scheduled for maintenance pain ordered refill.  Norco 5/325 TID prn - ordered refill  · Continue Neurontin 800 TID- Has plenty filled 3/8/2021       Meds. Prescribed:   Orders Placed This Encounter   Medications    morphine (MS CONTIN) 15 MG extended release tablet     Sig: Take 1 tablet by mouth every 12 hours for 30 days. Dispense:  60 tablet     Refill:  0     Reduce doses taken as pain becomes manageable    HYDROcodone-acetaminophen (NORCO) 5-325 MG per tablet     Sig: Take 1 tablet by mouth every 8 hours as needed for Pain for up to 30 days.      Dispense:  90 tablet     Refill:  0     Reduce doses taken as pain becomes manageable       Return in about 3 months (around 6/15/2021), or if symptoms worsen or fail to improve, for follow up  for medications or after permanant SCS .                Electronically signed by FADUMO Holt CNP on3/15/2021 at 11:07 AM

## 2021-03-24 ENCOUNTER — TELEPHONE (OUTPATIENT)
Dept: PHYSICAL MEDICINE AND REHAB | Age: 56
End: 2021-03-24

## 2021-04-12 ENCOUNTER — TELEPHONE (OUTPATIENT)
Dept: PHYSICAL MEDICINE AND REHAB | Age: 56
End: 2021-04-12

## 2021-04-12 DIAGNOSIS — G89.4 PAIN SYNDROME, CHRONIC: ICD-10-CM

## 2021-04-12 DIAGNOSIS — G89.29 OTHER CHRONIC PAIN: ICD-10-CM

## 2021-04-12 NOTE — TELEPHONE ENCOUNTER
Angelito Okeefe called requesting a refill on the following medications:  Requested Prescriptions     Pending Prescriptions Disp Refills    morphine (MS CONTIN) 15 MG extended release tablet 60 tablet 0     Sig: Take 1 tablet by mouth every 12 hours for 30 days.  HYDROcodone-acetaminophen (NORCO) 5-325 MG per tablet 90 tablet 0     Sig: Take 1 tablet by mouth every 8 hours as needed for Pain for up to 30 days.  gabapentin (NEURONTIN) 800 MG tablet 270 tablet 0     Sig: Take 1 tablet by mouth 3 times daily for 90 days.      Pharmacy verified:  [facility] in [place], [place] on [address]    Date of last visit: 03/15/2021  Date of next visit (if applicable): 7/97/9875

## 2021-04-12 NOTE — TELEPHONE ENCOUNTER
Patient states that she was supposed to have a referral for a neck stimulator and that the referral has not been sent yet and she just wanted to check on this.   Please advise  [phone]    DOLV 03/15/2021 DONV 06/21/2021

## 2021-04-13 RX ORDER — GABAPENTIN 800 MG/1
800 TABLET ORAL 3 TIMES DAILY
Qty: 270 TABLET | Refills: 0 | Status: SHIPPED | OUTPATIENT
Start: 2021-04-13 | End: 2021-06-14 | Stop reason: SDUPTHER

## 2021-04-13 RX ORDER — MORPHINE SULFATE 15 MG/1
15 TABLET, FILM COATED, EXTENDED RELEASE ORAL EVERY 12 HOURS
Qty: 60 TABLET | Refills: 0 | Status: SHIPPED | OUTPATIENT
Start: 2021-04-14 | End: 2021-05-14 | Stop reason: SDUPTHER

## 2021-04-13 RX ORDER — HYDROCODONE BITARTRATE AND ACETAMINOPHEN 5; 325 MG/1; MG/1
1 TABLET ORAL EVERY 8 HOURS PRN
Qty: 90 TABLET | Refills: 0 | Status: SHIPPED | OUTPATIENT
Start: 2021-04-14 | End: 2021-05-14 | Stop reason: SDUPTHER

## 2021-04-13 NOTE — TELEPHONE ENCOUNTER
OARRS reviewed. UDS: + for Dihydrocodeine, Hydrocodone, Norhydrocodone, Hydromorphone, Gabapentin, Morphine. Last seen: 3/15/2021.  Follow-up:   Future Appointments   Date Time Provider Kaleb Mary Jo   6/21/2021 11:00 AM FADUMO Almeida - CNP N [facility] - [place]

## 2021-04-13 NOTE — TELEPHONE ENCOUNTER
Spoke with T[facility] [place]. States that they did receive the External referral for the SCS permanent implant. They will be contacting pt. LVM for pt. Regarding this.

## 2021-05-11 ENCOUNTER — TELEPHONE (OUTPATIENT)
Dept: PHYSICAL MEDICINE AND REHAB | Age: 56
End: 2021-05-11

## 2021-05-11 NOTE — TELEPHONE ENCOUNTER
Patient is calling in regarding her morphine prescription. She has new insurance, [employer] dual plan (updated in computer), and the morphine will need prior authorized for her next refill. Please advise if ok to start that process.

## 2021-05-11 NOTE — TELEPHONE ENCOUNTER
Prior auth submitted to plan     Key: [id number]     morphine (MS CONTIN) 15 MG extended release tablet

## 2021-05-14 DIAGNOSIS — G89.29 OTHER CHRONIC PAIN: ICD-10-CM

## 2021-05-14 DIAGNOSIS — G89.4 PAIN SYNDROME, CHRONIC: ICD-10-CM

## 2021-05-14 RX ORDER — MORPHINE SULFATE 15 MG/1
15 TABLET, FILM COATED, EXTENDED RELEASE ORAL EVERY 12 HOURS
Qty: 60 TABLET | Refills: 0 | Status: SHIPPED | OUTPATIENT
Start: 2021-05-15 | End: 2021-06-14 | Stop reason: SDUPTHER

## 2021-05-14 RX ORDER — HYDROCODONE BITARTRATE AND ACETAMINOPHEN 5; 325 MG/1; MG/1
1 TABLET ORAL EVERY 8 HOURS PRN
Qty: 90 TABLET | Refills: 0 | Status: SHIPPED | OUTPATIENT
Start: 2021-05-15 | End: 2021-06-14 | Stop reason: SDUPTHER

## 2021-05-14 NOTE — TELEPHONE ENCOUNTER
OARRS reviewed. UDS: + for   Last seen: 3/15/2021.  Follow-up:   Future Appointments   Date Time Provider Kaleb Camargo   6/21/2021 11:00 AM FADUMO Almeida - CNP N [facility] - [place]

## 2021-06-14 DIAGNOSIS — G89.4 PAIN SYNDROME, CHRONIC: ICD-10-CM

## 2021-06-14 DIAGNOSIS — G89.29 OTHER CHRONIC PAIN: ICD-10-CM

## 2021-06-14 RX ORDER — MORPHINE SULFATE 15 MG/1
15 TABLET, FILM COATED, EXTENDED RELEASE ORAL EVERY 12 HOURS
Qty: 60 TABLET | Refills: 0 | Status: SHIPPED | OUTPATIENT
Start: 2021-06-14 | End: 2021-07-13 | Stop reason: SDUPTHER

## 2021-06-14 RX ORDER — GABAPENTIN 800 MG/1
800 TABLET ORAL 3 TIMES DAILY
Qty: 270 TABLET | Refills: 0 | Status: SHIPPED | OUTPATIENT
Start: 2021-06-14 | End: 2021-09-08 | Stop reason: SDUPTHER

## 2021-06-14 RX ORDER — HYDROCODONE BITARTRATE AND ACETAMINOPHEN 5; 325 MG/1; MG/1
1 TABLET ORAL EVERY 8 HOURS PRN
Qty: 90 TABLET | Refills: 0 | Status: SHIPPED | OUTPATIENT
Start: 2021-06-14 | End: 2021-07-13 | Stop reason: SDUPTHER

## 2021-06-14 NOTE — TELEPHONE ENCOUNTER
Randolph Hamilton called requesting a refill on the following medications:  Requested Prescriptions     Pending Prescriptions Disp Refills    morphine (MS CONTIN) 15 MG extended release tablet 60 tablet 0     Sig: Take 1 tablet by mouth every 12 hours for 30 days.  HYDROcodone-acetaminophen (NORCO) 5-325 MG per tablet 90 tablet 0     Sig: Take 1 tablet by mouth every 8 hours as needed for Pain for up to 30 days.  gabapentin (NEURONTIN) 800 MG tablet 270 tablet 0     Sig: Take 1 tablet by mouth 3 times daily for 90 days. Pharmacy verified: [facility]      Date of last visit: 3/15/2021  Date of next visit (if applicable): 6/07/0542

## 2021-06-21 ENCOUNTER — OFFICE VISIT (OUTPATIENT)
Dept: PHYSICAL MEDICINE AND REHAB | Age: 56
End: 2021-06-21
Payer: COMMERCIAL

## 2021-06-21 VITALS
BODY MASS INDEX: 27.78 KG/M2 | SYSTOLIC BLOOD PRESSURE: 114 MMHG | DIASTOLIC BLOOD PRESSURE: 62 MMHG | WEIGHT: 177 LBS | HEIGHT: 67 IN

## 2021-06-21 DIAGNOSIS — G82.54 INCOMPLETE QUADRIPLEGIA AT C5-C8 LEVEL (HCC): ICD-10-CM

## 2021-06-21 DIAGNOSIS — G89.4 CHRONIC PAIN SYNDROME: ICD-10-CM

## 2021-06-21 DIAGNOSIS — G62.9 NEUROPATHY: ICD-10-CM

## 2021-06-21 DIAGNOSIS — M47.12 SPONDYLOSIS, CERVICAL, WITH MYELOPATHY: ICD-10-CM

## 2021-06-21 DIAGNOSIS — G62.9 PERIPHERAL POLYNEUROPATHY: ICD-10-CM

## 2021-06-21 DIAGNOSIS — M48.062 SPINAL STENOSIS, LUMBAR REGION, WITH NEUROGENIC CLAUDICATION: ICD-10-CM

## 2021-06-21 DIAGNOSIS — M54.16 LUMBAR RADICULITIS: ICD-10-CM

## 2021-06-21 DIAGNOSIS — M54.12 CERVICAL RADICULOPATHY: Primary | ICD-10-CM

## 2021-06-21 PROCEDURE — 99214 OFFICE O/P EST MOD 30 MIN: CPT | Performed by: NURSE PRACTITIONER

## 2021-06-21 RX ORDER — NALOXONE HYDROCHLORIDE 4 MG/.1ML
1 SPRAY NASAL PRN
Qty: 1 EACH | Refills: 0 | Status: SHIPPED | OUTPATIENT
Start: 2021-06-21

## 2021-06-21 ASSESSMENT — ENCOUNTER SYMPTOMS
RESPIRATORY NEGATIVE: 1
BACK PAIN: 1
EYES NEGATIVE: 1
GASTROINTESTINAL NEGATIVE: 1

## 2021-06-21 NOTE — PROGRESS NOTES
scale with out pain medications or at its worst is 7-10/10. Pain scale with pain medications or at its best is 4-5/10. Last dose of MS ER, Norco, and neurontin was today  Drug screen reviewed from 3/15/2021 and was appropriate  Pill count was completed today and was appropriate  Patient does have naloxone available at home. Patient has not required use of naloxone at home since last office visit. The patientis allergic to ibuprofen. Subjective:      Review of Systems   Constitutional: Negative. Negative for activity change. HENT: Negative. Eyes: Negative. Respiratory: Negative. Cardiovascular: Negative. Gastrointestinal: Negative. Genitourinary: Negative. Musculoskeletal: Positive for arthralgias, back pain, gait problem, joint swelling, myalgias, neck pain and neck stiffness. Skin: Negative. Neurological: Positive for weakness, numbness and headaches. Psychiatric/Behavioral: Positive for sleep disturbance. Negative for dysphoric mood. The patient is not nervous/anxious. Objective:     Vitals:    06/21/21 1018   BP: 114/62   Weight: 177 lb (80.3 kg)   Height: 5' 7\" (1.702 m)       Physical Exam  Vitals and nursing note reviewed. Constitutional:       General: She is not in acute distress. Appearance: She is well-developed. She is not diaphoretic. HENT:      Head: Normocephalic and atraumatic. Right Ear: External ear normal.      Left Ear: External ear normal.      Nose: Nose normal.      Mouth/Throat:      Pharynx: No oropharyngeal exudate. Eyes:      General: No scleral icterus. Right eye: No discharge. Left eye: No discharge. Conjunctiva/sclera: Conjunctivae normal.      Pupils: Pupils are equal, round, and reactive to light. Neck:      Thyroid: No thyromegaly. Cardiovascular:      Rate and Rhythm: Normal rate and regular rhythm. Heart sounds: Normal heart sounds. No murmur heard. No friction rub. No gallop.     Pulmonary: Effort: Pulmonary effort is normal. No respiratory distress. Breath sounds: Normal breath sounds. No wheezing or rales. Chest:      Chest wall: No tenderness. Abdominal:      General: Bowel sounds are normal. There is no distension. Palpations: Abdomen is soft. Tenderness: There is no abdominal tenderness. There is no guarding or rebound. Musculoskeletal:         General: Tenderness present. Right shoulder: Tenderness present. Decreased range of motion. Left shoulder: Tenderness present. Decreased range of motion. Right upper arm: Tenderness present. Left upper arm: Tenderness present. Right elbow: Tenderness present. Left elbow: Tenderness present. Right forearm: Tenderness present. Left forearm: Tenderness present. Right wrist: Tenderness present. Left wrist: Tenderness present. Right hand: Tenderness present. Decreased strength. Left hand: Tenderness present. Decreased strength. Cervical back: Full passive range of motion without pain, normal range of motion and neck supple. Spasms and tenderness present. No edema, erythema or rigidity. No muscular tenderness. Normal range of motion. Thoracic back: Tenderness present. Lumbar back: Spasms and tenderness present. Back:       Right hip: Tenderness present. Left hip: Tenderness present. Right upper leg: Tenderness present. Left upper leg: Tenderness present. Right knee: Tenderness present. Left knee: Swelling present. Decreased range of motion. Tenderness present. Right lower leg: Swelling and tenderness present. Edema present. Left lower leg: Swelling and tenderness present. Edema present. Right ankle: Swelling present. Tenderness present. Left ankle: Swelling present. Tenderness present. Decreased range of motion. Right foot: Swelling and tenderness present. Left foot: Decreased range of motion.  Swelling and tenderness present. Skin:     General: Skin is warm. Coloration: Skin is not pale. Findings: No erythema or rash. Neurological:      Mental Status: She is alert and oriented to person, place, and time. She is not disoriented. Cranial Nerves: No cranial nerve deficit. Sensory: Sensory deficit present. Motor: Weakness present. No atrophy or abnormal muscle tone. Coordination: Coordination normal.      Gait: Gait normal.      Deep Tendon Reflexes: Babinski sign absent on the right side. Reflex Scores:       Tricep reflexes are 1+ on the right side and 1+ on the left side. Bicep reflexes are 1+ on the right side and 1+ on the left side. Brachioradialis reflexes are 1+ on the right side and 1+ on the left side. Patellar reflexes are 2+ on the right side and 2+ on the left side. Achilles reflexes are 2+ on the right side and 2+ on the left side. Comments: SLR -  Motor 4/5 LUE LLE RUE 4/5 RLE 4/5   Psychiatric:         Attention and Perception: She is attentive. Mood and Affect: Mood is not anxious or depressed. Affect is not labile, blunt, angry or inappropriate. Speech: Speech normal.         Behavior: Behavior normal. Behavior is not agitated, slowed, aggressive, withdrawn, hyperactive or combative. Thought Content: Thought content normal. Thought content is not paranoid or delusional. Thought content does not include homicidal or suicidal ideation. Thought content does not include homicidal or suicidal plan. Cognition and Memory: Memory is not impaired. She does not exhibit impaired recent memory or impaired remote memory. Judgment: Judgment normal. Judgment is not impulsive or inappropriate. DALTON test: +   Yeomans test: +   Gaenslen test: +      Assessment:     1. Cervical radiculopathy    2. Spondylosis, cervical, with myelopathy    3. Neuropathy    4. Lumbar radiculitis    5.  Spinal stenosis, lumbar region, with neurogenic claudication    6. Incomplete quadriplegia at C5-C8 level ([facility] [address]    7. Chronic pain syndrome    8. Peripheral polyneuropathy            Plan:      · OARRS reviewed. Current MED: 45.00  · Patient was offered naloxone for home. previous one , ordered another today   · Discussed long term side effects of medications, tolerance, dependency and addiction. · Previous UDS reviewed  · UDS preformed today for compliance. · Patient told can not receive any pain medications from any other source. · No evidence of abuse, diversion or aberrant behavior.  Medications and/or procedures to improve function and quality of life- patient understanding with this and that may not be pain free   Discussed with patient about safe storage of medications at home   Discussed possible weaning of medication dosing dependent on treatment/procedure results.  Discussed with patient about risks with procedure including infection, reaction to medication, increased pain, or bleeding. · Procedure notes reveiwed   · Received 85% relief of hand numbness and neck pain from cervical SCS trial   · Awaiting appointment with Dr. Diya Rosado for permanant cervical SCS    Continues good relief from SCS of low back and leg pain    Medications remain effective- Continue MS Contin 15 mg BID scheduled for maintenance pain - filled 2021  Norco 5/325 TID prn - filled 2021  · Continue Neurontin 800 TID- still has plenty   · Updated UDS ordered today- patient is complaint. · Will plan to wean medications as pain improves       Meds. Prescribed:   Orders Placed This Encounter   Medications    naloxone 4 MG/0.1ML LIQD nasal spray     Si spray by Nasal route as needed for Opioid Reversal     Dispense:  1 each     Refill:  0       Return in about 3 months (around 2021), or if symptoms worsen or fail to improve, for follow up  for medications.                Electronically signed by FADUMO Chin - CNP on6/21/2021 at 10:54 AM

## 2021-07-13 DIAGNOSIS — G89.29 OTHER CHRONIC PAIN: ICD-10-CM

## 2021-07-13 DIAGNOSIS — G89.4 PAIN SYNDROME, CHRONIC: ICD-10-CM

## 2021-07-13 RX ORDER — HYDROCODONE BITARTRATE AND ACETAMINOPHEN 5; 325 MG/1; MG/1
1 TABLET ORAL EVERY 8 HOURS PRN
Qty: 90 TABLET | Refills: 0 | Status: SHIPPED | OUTPATIENT
Start: 2021-07-14 | End: 2021-08-12 | Stop reason: SDUPTHER

## 2021-07-13 RX ORDER — MORPHINE SULFATE 15 MG/1
15 TABLET, FILM COATED, EXTENDED RELEASE ORAL EVERY 12 HOURS
Qty: 60 TABLET | Refills: 0 | Status: SHIPPED | OUTPATIENT
Start: 2021-07-14 | End: 2021-08-12 | Stop reason: SDUPTHER

## 2021-07-13 NOTE — TELEPHONE ENCOUNTER
Luis Tamayo called requesting a refill on the following medications:  Requested Prescriptions     Pending Prescriptions Disp Refills    morphine (MS CONTIN) 15 MG extended release tablet 60 tablet 0     Sig: Take 1 tablet by mouth every 12 hours for 30 days.  HYDROcodone-acetaminophen (NORCO) 5-325 MG per tablet 90 tablet 0     Sig: Take 1 tablet by mouth every 8 hours as needed for Pain for up to 30 days.      Pharmacy verified:  [facility]       Date of last visit: 06-21-21  Date of next visit (if applicable): 7/88/4317

## 2021-07-13 NOTE — TELEPHONE ENCOUNTER
OARRS reviewed. UDS: + for Dihydrocodeine, Hydrocodone, Norhydrocodone, Hydromorphone, Gabapentin, Morphine. Last seen: 6/21/2021.  Follow-up:   Future Appointments   Date Time Provider Kaleb Camargo   9/27/2021 11:00 AM FADUMO Richardson - CNP N [facility] - [facility] [place]

## 2021-08-12 DIAGNOSIS — G89.4 PAIN SYNDROME, CHRONIC: ICD-10-CM

## 2021-08-12 DIAGNOSIS — G89.29 OTHER CHRONIC PAIN: ICD-10-CM

## 2021-08-12 RX ORDER — MORPHINE SULFATE 15 MG/1
15 TABLET, FILM COATED, EXTENDED RELEASE ORAL EVERY 12 HOURS
Qty: 60 TABLET | Refills: 0 | Status: SHIPPED | OUTPATIENT
Start: 2021-08-12 | End: 2021-09-08 | Stop reason: SDUPTHER

## 2021-08-12 RX ORDER — HYDROCODONE BITARTRATE AND ACETAMINOPHEN 5; 325 MG/1; MG/1
1 TABLET ORAL EVERY 8 HOURS PRN
Qty: 90 TABLET | Refills: 0 | Status: SHIPPED | OUTPATIENT
Start: 2021-08-12 | End: 2021-09-08 | Stop reason: SDUPTHER

## 2021-08-12 NOTE — TELEPHONE ENCOUNTER
OARRS reviewed. UDS: + for Gabapentin, Morphine, Hydrocodone. Last seen: 6/21/2021.  Follow-up:   Future Appointments   Date Time Provider Kaleb Mary Jo   9/27/2021 11:00 AM FADUMO Farley - CNP [facility] - [place]

## 2021-08-12 NOTE — TELEPHONE ENCOUNTER
Benjamin Torres called requesting a refill on the following medications:  Requested Prescriptions     Pending Prescriptions Disp Refills    morphine (MS CONTIN) 15 MG extended release tablet 60 tablet 0     Sig: Take 1 tablet by mouth every 12 hours for 30 days.  HYDROcodone-acetaminophen (NORCO) 5-325 MG per tablet 90 tablet 0     Sig: Take 1 tablet by mouth every 8 hours as needed for Pain for up to 30 days.      Pharmacy verified:[facility]      Date of last visit: 6/21/21  Date of next visit (if applicable): 4/49/7771

## 2021-09-08 DIAGNOSIS — G89.4 PAIN SYNDROME, CHRONIC: ICD-10-CM

## 2021-09-08 DIAGNOSIS — G89.29 OTHER CHRONIC PAIN: ICD-10-CM

## 2021-09-08 RX ORDER — HYDROCODONE BITARTRATE AND ACETAMINOPHEN 5; 325 MG/1; MG/1
1 TABLET ORAL EVERY 8 HOURS PRN
Qty: 90 TABLET | Refills: 0 | Status: SHIPPED | OUTPATIENT
Start: 2021-09-11 | End: 2021-10-25 | Stop reason: SDUPTHER

## 2021-09-08 RX ORDER — GABAPENTIN 800 MG/1
800 TABLET ORAL 3 TIMES DAILY
Qty: 270 TABLET | Refills: 0 | Status: SHIPPED | OUTPATIENT
Start: 2021-09-08 | End: 2021-12-06 | Stop reason: SDUPTHER

## 2021-09-08 RX ORDER — MORPHINE SULFATE 15 MG/1
15 TABLET, FILM COATED, EXTENDED RELEASE ORAL EVERY 12 HOURS
Qty: 60 TABLET | Refills: 0 | Status: SHIPPED | OUTPATIENT
Start: 2021-09-11 | End: 2021-10-13 | Stop reason: SDUPTHER

## 2021-09-08 NOTE — TELEPHONE ENCOUNTER
OARRS reviewed. UDS: + for Hydrocodone, Gabapentin, Morphine. Last seen: 6/21/2021.  Follow-up:   Future Appointments   Date Time Provider Kaleb Camargo   9/27/2021 11:00 AM FADUMO Redding - CNP [facility] - [facility]

## 2021-09-27 ENCOUNTER — OFFICE VISIT (OUTPATIENT)
Dept: PHYSICAL MEDICINE AND REHAB | Age: 56
End: 2021-09-27
Payer: COMMERCIAL

## 2021-09-27 VITALS
WEIGHT: 159 LBS | SYSTOLIC BLOOD PRESSURE: 100 MMHG | DIASTOLIC BLOOD PRESSURE: 50 MMHG | HEIGHT: 67 IN | BODY MASS INDEX: 24.96 KG/M2

## 2021-09-27 DIAGNOSIS — M48.062 SPINAL STENOSIS, LUMBAR REGION, WITH NEUROGENIC CLAUDICATION: ICD-10-CM

## 2021-09-27 DIAGNOSIS — M47.12 SPONDYLOSIS, CERVICAL, WITH MYELOPATHY: ICD-10-CM

## 2021-09-27 DIAGNOSIS — G89.4 CHRONIC PAIN SYNDROME: ICD-10-CM

## 2021-09-27 DIAGNOSIS — M54.12 CERVICAL RADICULOPATHY: Primary | ICD-10-CM

## 2021-09-27 DIAGNOSIS — M54.16 LUMBAR RADICULITIS: ICD-10-CM

## 2021-09-27 DIAGNOSIS — G62.9 PERIPHERAL POLYNEUROPATHY: ICD-10-CM

## 2021-09-27 DIAGNOSIS — G62.9 NEUROPATHY: ICD-10-CM

## 2021-09-27 DIAGNOSIS — G82.54 INCOMPLETE QUADRIPLEGIA AT C5-C8 LEVEL (HCC): ICD-10-CM

## 2021-09-27 PROCEDURE — 99214 OFFICE O/P EST MOD 30 MIN: CPT | Performed by: NURSE PRACTITIONER

## 2021-09-27 RX ORDER — LISINOPRIL 5 MG/1
TABLET ORAL
COMMUNITY
Start: 2021-09-08

## 2021-09-27 ASSESSMENT — ENCOUNTER SYMPTOMS
GASTROINTESTINAL NEGATIVE: 1
EYES NEGATIVE: 1
RESPIRATORY NEGATIVE: 1
BACK PAIN: 1

## 2021-09-27 NOTE — PROGRESS NOTES
Patient states she is taking medications as prescribed. Shedenies receiving pain medications from other sources. She denies any ER visits since last visit. Pain scale with out pain medications or at its worst is 8-10/10. Pain scale with pain medications or at its best is 5/10. Last dose of MS ER, Norco, and neurontin was today   Drug screen reviewed from 6/21/2021 and was appropriate  Pill count was completed today and was appropriate   Patient does have naloxone available at home. Patient has not required use of naloxone at home since last office visit. The patientis allergic to ibuprofen. Subjective:      Review of Systems   Constitutional: Negative. Negative for activity change. HENT: Negative. Eyes: Negative. Respiratory: Negative. Cardiovascular: Negative. Gastrointestinal: Negative. Genitourinary: Negative. Musculoskeletal: Positive for arthralgias, back pain, gait problem, joint swelling, myalgias, neck pain and neck stiffness. Skin: Negative. Neurological: Positive for weakness, numbness and headaches. Psychiatric/Behavioral: Positive for sleep disturbance. Negative for dysphoric mood. The patient is not nervous/anxious. Objective:     Vitals:    09/27/21 1050   BP: (!) 100/50   Weight: 159 lb (72.1 kg)   Height: 5' 7\" (1.702 m)       Physical Exam  Vitals and nursing note reviewed. Constitutional:       General: She is not in acute distress. Appearance: She is well-developed. She is not diaphoretic. HENT:      Head: Normocephalic and atraumatic. Right Ear: External ear normal.      Left Ear: External ear normal.      Nose: Nose normal.      Mouth/Throat:      Pharynx: No oropharyngeal exudate. Eyes:      General: No scleral icterus. Right eye: No discharge. Left eye: No discharge. Conjunctiva/sclera: Conjunctivae normal.      Pupils: Pupils are equal, round, and reactive to light. Neck:      Thyroid: No thyromegaly. Cardiovascular:      Rate and Rhythm: Normal rate and regular rhythm. Heart sounds: Normal heart sounds. No murmur heard. No friction rub. No gallop. Pulmonary:      Effort: Pulmonary effort is normal. No respiratory distress. Breath sounds: Normal breath sounds. No wheezing or rales. Chest:      Chest wall: No tenderness. Abdominal:      General: Bowel sounds are normal. There is no distension. Palpations: Abdomen is soft. Tenderness: There is no abdominal tenderness. There is no guarding or rebound. Musculoskeletal:         General: Tenderness present. Right shoulder: Tenderness present. Decreased range of motion. Left shoulder: Tenderness present. Decreased range of motion. Right upper arm: Tenderness present. Left upper arm: Tenderness present. Right elbow: Tenderness present. Left elbow: Tenderness present. Right forearm: Tenderness present. Left forearm: Tenderness present. Right wrist: Tenderness present. Left wrist: Tenderness present. Right hand: Tenderness present. Decreased strength. Left hand: Tenderness present. Decreased strength. Cervical back: Full passive range of motion without pain, normal range of motion and neck supple. Spasms and tenderness present. No edema, erythema or rigidity. No muscular tenderness. Normal range of motion. Thoracic back: Tenderness present. Lumbar back: Spasms and tenderness present. Back:       Right hip: Tenderness present. Left hip: Tenderness present. Right upper leg: Tenderness present. Left upper leg: Tenderness present. Right knee: Tenderness present. Left knee: Swelling present. Decreased range of motion. Tenderness present. Right lower leg: Swelling and tenderness present. Edema present. Left lower leg: Swelling and tenderness present. Edema present. Right ankle: Swelling present. Tenderness present.       Left ankle: Swelling present. Tenderness present. Decreased range of motion. Right foot: Swelling and tenderness present. Left foot: Decreased range of motion. Swelling and tenderness present. Skin:     General: Skin is warm. Coloration: Skin is not pale. Findings: No erythema or rash. Neurological:      Mental Status: She is alert and oriented to person, place, and time. She is not disoriented. Cranial Nerves: No cranial nerve deficit. Sensory: Sensory deficit present. Motor: Weakness present. No atrophy or abnormal muscle tone. Coordination: Coordination normal.      Gait: Gait normal.      Deep Tendon Reflexes: Babinski sign absent on the right side. Reflex Scores:       Tricep reflexes are 1+ on the right side and 1+ on the left side. Bicep reflexes are 1+ on the right side and 1+ on the left side. Brachioradialis reflexes are 1+ on the right side and 1+ on the left side. Patellar reflexes are 2+ on the right side and 2+ on the left side. Achilles reflexes are 2+ on the right side and 2+ on the left side. Comments: SLR -  Motor 4/5 LUE LLE RUE 4/5 RLE 4/5   Psychiatric:         Attention and Perception: She is attentive. Mood and Affect: Mood is not anxious or depressed. Affect is not labile, blunt, angry or inappropriate. Speech: Speech normal.         Behavior: Behavior normal. Behavior is not agitated, slowed, aggressive, withdrawn, hyperactive or combative. Thought Content: Thought content normal. Thought content is not paranoid or delusional. Thought content does not include homicidal or suicidal ideation. Thought content does not include homicidal or suicidal plan. Cognition and Memory: Memory is not impaired. She does not exhibit impaired recent memory or impaired remote memory. Judgment: Judgment normal. Judgment is not impulsive or inappropriate.        DALTON test: +   Yeomans test: + Gaenslen test: +      Assessment:     1. Cervical radiculopathy    2. Spondylosis, cervical, with myelopathy    3. Neuropathy    4. Lumbar radiculitis    5. Spinal stenosis, lumbar region, with neurogenic claudication    6. Incomplete quadriplegia at C5-C8 level ([address]    7. Peripheral polyneuropathy    8. Chronic pain syndrome            Plan:      · OARRS reviewed. Current MED: 45.00  · Patient was offered naloxone for home. · Discussed long term side effects of medications, tolerance, dependency and addiction. · Previous UDS reviewed  · UDS preformed today for compliance. · Patient told can not receive any pain medications from any other source. · No evidence of abuse, diversion or aberrant behavior.  Medications and/or procedures to improve function and quality of life- patient understanding with this and that may not be pain free   Discussed with patient about safe storage of medications at home   Discussed possible weaning of medication dosing dependent on treatment/procedure results.  Discussed with patient about risks with procedure including infection, reaction to medication, increased pain, or bleeding. · Procedure notes reveiwed   · Received 85% relief of hand numbness and neck pain from cervical SCS trial   · Awaiting appointment with Dr. Alida Higuera scheduled October 11 th for permanant cervical SCS   · Continues to receiverelief of low back and leg pain from SCS- is going to get it adjusted. · Medications remain effective- Continue MS Contin 15 mg BID scheduled for maintenance pain - filled 9/11/2021  Norco 5/325 TID prn - filled 9/18/2021  · Continue Neurontin 800 TID- filled 9/8/2021 for 90 days   · Patient is compliant. Updated UDS ordered today. · Again discussed weaning down pain medications when relief from SCS. · Ordered external PT and aqua therapy for pain and strengthening       Meds. Prescribed:   No orders of the defined types were placed in this encounter.       Return in about 3 months (around 12/27/2021), or if symptoms worsen or fail to improve, for follow up  for medications.                Electronically signed by FADUMO Arshad CNP on9/27/2021 at 11:13 AM

## 2021-10-04 ENCOUNTER — TELEPHONE (OUTPATIENT)
Dept: PHYSICAL MEDICINE AND REHAB | Age: 56
End: 2021-10-04

## 2021-10-04 NOTE — TELEPHONE ENCOUNTER
Patient called and stated she had new insurance and that the new insurance is requesting a Prior authorization for morphine and norco.    Ph[facility]acy: [id number] in [address] on [address] [facility] [phone]    [facility]# [id number]

## 2021-10-13 DIAGNOSIS — G89.4 PAIN SYNDROME, CHRONIC: ICD-10-CM

## 2021-10-13 RX ORDER — MORPHINE SULFATE 15 MG/1
15 TABLET, FILM COATED, EXTENDED RELEASE ORAL EVERY 12 HOURS
Qty: 60 TABLET | Refills: 0 | Status: SHIPPED | OUTPATIENT
Start: 2021-10-13 | End: 2021-11-15 | Stop reason: SDUPTHER

## 2021-10-13 NOTE — TELEPHONE ENCOUNTER
OARRS reviewed. UDS + for Dihydrocodeine, Hydrocodone, Norhydrocodone, Hydromorphone, Gabapentin, Morphine  Last seen: 9/27/2021.   Follow-up: 12/27/2021

## 2021-10-13 NOTE — TELEPHONE ENCOUNTER
Mago Post called requesting a refill on the following medications:  Requested Prescriptions     Pending Prescriptions Disp Refills    morphine (MS CONTIN) 15 MG extended release tablet 60 tablet 0     Sig: Take 1 tablet by mouth every 12 hours for 30 days. [facility] verified:  [facility]    Date of last visit: 09/27/2021  Date of next visit (if applicable): 17/10/3791      * patient states that she's a little behind on calling in this request and asked to let Abelardo Gomez know that she will be out by this weekend.

## 2021-10-25 ENCOUNTER — TELEPHONE (OUTPATIENT)
Dept: PHYSICAL MEDICINE AND REHAB | Age: 56
End: 2021-10-25

## 2021-10-25 DIAGNOSIS — G89.4 PAIN SYNDROME, CHRONIC: ICD-10-CM

## 2021-10-25 DIAGNOSIS — G89.29 OTHER CHRONIC PAIN: ICD-10-CM

## 2021-10-25 RX ORDER — HYDROCODONE BITARTRATE AND ACETAMINOPHEN 5; 325 MG/1; MG/1
1 TABLET ORAL EVERY 8 HOURS PRN
Qty: 90 TABLET | Refills: 0 | Status: SHIPPED | OUTPATIENT
Start: 2021-10-25 | End: 2021-12-06 | Stop reason: SDUPTHER

## 2021-10-25 NOTE — TELEPHONE ENCOUNTER
Lulú Albarran called requesting a refill on the following medications:  Requested Prescriptions     Pending Prescriptions Disp Refills    HYDROcodone-acetaminophen (NORCO) 5-325 MG per tablet 90 tablet 0     Sig: Take 1 tablet by mouth every 8 hours as needed for Pain for up to 30 days. Pharmacy verified: [facility] in [place]   . pv      Date of last visit: 9/27/2021  Date of next visit (if applicable): 50/46/8883

## 2021-11-15 DIAGNOSIS — G89.4 PAIN SYNDROME, CHRONIC: ICD-10-CM

## 2021-11-15 NOTE — TELEPHONE ENCOUNTER
OARRS reviewed. UDS: + for  Hydrocodone, Gabapentin, Morphine -consistent. Last seen: 9/27/2021.  Follow-up:   Future Appointments   Date Time Provider Kaleb Mary Jo   12/27/2021 11:00 AM FADUMO Yang - CNP [facility] - [place] [address]

## 2021-11-15 NOTE — TELEPHONE ENCOUNTER
Edi Nix called requesting a refill on the following medications:  Requested Prescriptions     Pending Prescriptions Disp Refills    morphine (MS CONTIN) 15 MG extended release tablet 60 tablet 0     Sig: Take 1 tablet by mouth every 12 hours for 30 days. Pharm[facility] verified[facility]  . [facility]      Date of last visit:   Date of next visit (if applicable): 82/09/4162

## 2021-11-16 RX ORDER — MORPHINE SULFATE 15 MG/1
15 TABLET, FILM COATED, EXTENDED RELEASE ORAL EVERY 12 HOURS
Qty: 60 TABLET | Refills: 0 | Status: SHIPPED | OUTPATIENT
Start: 2021-11-16 | End: 2021-12-14 | Stop reason: SDUPTHER

## 2021-12-03 DIAGNOSIS — G89.29 OTHER CHRONIC PAIN: ICD-10-CM

## 2021-12-03 DIAGNOSIS — G89.4 PAIN SYNDROME, CHRONIC: ICD-10-CM

## 2021-12-03 NOTE — TELEPHONE ENCOUNTER
Nicolas Persons called requesting a refill on the following medications:    REMINDER SHE GETS 90 DAY SUPPLY OF GABAPENTIN  Requested Prescriptions     Pending Prescriptions Disp Refills    HYDROcodone-acetaminophen (NORCO) 5-325 MG per tablet 90 tablet 0     Sig: Take 1 tablet by mouth every 8 hours as needed for Pain for up to 30 days.  gabapentin (NEURONTIN) 800 MG tablet 270 tablet 0     Sig: Take 1 tablet by mouth 3 times daily for 90 days.      Pharm[facility] verified:  [facility]      Date of last visit: 09-27-21  Date of next visit (if applicable): 6/34/5893

## 2021-12-06 RX ORDER — GABAPENTIN 800 MG/1
800 TABLET ORAL 3 TIMES DAILY
Qty: 270 TABLET | Refills: 0 | Status: SHIPPED | OUTPATIENT
Start: 2021-12-08 | End: 2022-03-07 | Stop reason: SDUPTHER

## 2021-12-06 RX ORDER — HYDROCODONE BITARTRATE AND ACETAMINOPHEN 5; 325 MG/1; MG/1
1 TABLET ORAL EVERY 8 HOURS PRN
Qty: 90 TABLET | Refills: 0 | Status: SHIPPED | OUTPATIENT
Start: 2021-12-06 | End: 2022-01-27 | Stop reason: SDUPTHER

## 2021-12-06 NOTE — TELEPHONE ENCOUNTER
OARRS reviewed. UDS: + for  Dihydrocodeine, Hydrocodone, Norhydrocodone, Hydromorphone, Gabapentin, Morphine   Last seen: 9/27/2021.  Follow-up:   Future Appointments   Date Time Provider Kaleb Camargo   1/26/2022 10:00 AM FADUMO Yang - CNP [facility] - [place]

## 2021-12-14 DIAGNOSIS — G89.4 PAIN SYNDROME, CHRONIC: ICD-10-CM

## 2021-12-14 RX ORDER — MORPHINE SULFATE 15 MG/1
15 TABLET, FILM COATED, EXTENDED RELEASE ORAL EVERY 12 HOURS
Qty: 60 TABLET | Refills: 0 | Status: SHIPPED | OUTPATIENT
Start: 2021-12-16 | End: 2022-01-13 | Stop reason: SDUPTHER

## 2021-12-14 NOTE — TELEPHONE ENCOUNTER
Margarito Moran called requesting a refill on the following medications:  Requested Prescriptions     Pending Prescriptions Disp Refills    morphine (MS CONTIN) 15 MG extended release tablet 60 tablet 0     Sig: Take 1 tablet by mouth every 12 hours for 30 days. Pharmacy verified: [facility] in [place]  . pv      Date of last visit: 9/27/2021  Date of next visit (if applicable): 9/25/6337

## 2022-01-11 DIAGNOSIS — G89.29 OTHER CHRONIC PAIN: ICD-10-CM

## 2022-01-11 DIAGNOSIS — G89.4 PAIN SYNDROME, CHRONIC: ICD-10-CM

## 2022-01-11 NOTE — TELEPHONE ENCOUNTER
[facility] called requesting a refill on the following medications:  Requested Prescriptions     Pending Prescriptions Disp Refills    HYDROcodone-acetaminophen (NORCO) 5-325 MG per tablet 90 tablet 0     Sig: Take 1 tablet by mouth every 8 hours as needed for Pain for up to 30 days. Pharmacy verified: [address] [facility]  . pv      Date of last visit: 9/27/21  Date of next visit (if applicable): 6/42/5236

## 2022-01-13 DIAGNOSIS — G89.4 PAIN SYNDROME, CHRONIC: ICD-10-CM

## 2022-01-13 RX ORDER — MORPHINE SULFATE 15 MG/1
15 TABLET, FILM COATED, EXTENDED RELEASE ORAL EVERY 12 HOURS
Qty: 60 TABLET | Refills: 0 | Status: SHIPPED | OUTPATIENT
Start: 2022-01-16 | End: 2022-02-14 | Stop reason: SDUPTHER

## 2022-01-13 NOTE — TELEPHONE ENCOUNTER
OARRS reviewed. UDS: + for Dihydrocodeine, Hydrocodone, Norhydrocodone, Hydromorphone, Gabapentin, Morphine   Last seen: 9/27/2021.  Follow-up: 2/14/22

## 2022-01-27 DIAGNOSIS — G89.29 OTHER CHRONIC PAIN: ICD-10-CM

## 2022-01-27 DIAGNOSIS — G89.4 PAIN SYNDROME, CHRONIC: ICD-10-CM

## 2022-01-27 RX ORDER — HYDROCODONE BITARTRATE AND ACETAMINOPHEN 5; 325 MG/1; MG/1
1 TABLET ORAL EVERY 8 HOURS PRN
Qty: 90 TABLET | Refills: 0 | Status: SHIPPED | OUTPATIENT
Start: 2022-01-27 | End: 2022-02-26

## 2022-01-27 NOTE — TELEPHONE ENCOUNTER
Xu Padilla called requesting a refill on the following medications:  Requested Prescriptions     Pending Prescriptions Disp Refills    HYDROcodone-acetaminophen (NORCO) 5-325 MG per tablet 90 tablet 0     Sig: Take 1 tablet by mouth every 8 hours as needed for Pain for up to 30 days.      Pharmacy verified:  [facility]      Date of last visit: 09-27-21  Date of next visit (if applicable): 8/66/0627

## 2022-01-27 NOTE — TELEPHONE ENCOUNTER
OARRS reviewed. UDS: + for  Hydrocodone, Gabapentin, Morphine -consistent. Last seen: 9/27/2021.  Follow-up: 2/14/2022

## 2022-02-14 ENCOUNTER — OFFICE VISIT (OUTPATIENT)
Dept: PHYSICAL MEDICINE AND REHAB | Age: 57
End: 2022-02-14
Payer: MEDICARE

## 2022-02-14 ENCOUNTER — HOSPITAL ENCOUNTER (OUTPATIENT)
Dept: WOMENS IMAGING | Age: 57
Discharge: HOME | End: 2022-02-14
Payer: MEDICARE

## 2022-02-14 VITALS
DIASTOLIC BLOOD PRESSURE: 64 MMHG | SYSTOLIC BLOOD PRESSURE: 108 MMHG | WEIGHT: 159 LBS | HEIGHT: 67 IN | BODY MASS INDEX: 24.96 KG/M2

## 2022-02-14 DIAGNOSIS — M47.12 SPONDYLOSIS, CERVICAL, WITH MYELOPATHY: ICD-10-CM

## 2022-02-14 DIAGNOSIS — M54.16 LUMBAR RADICULITIS: ICD-10-CM

## 2022-02-14 DIAGNOSIS — G62.9 NEUROPATHY: ICD-10-CM

## 2022-02-14 DIAGNOSIS — G89.4 CHRONIC PAIN SYNDROME: ICD-10-CM

## 2022-02-14 DIAGNOSIS — G89.4 PAIN SYNDROME, CHRONIC: ICD-10-CM

## 2022-02-14 DIAGNOSIS — G82.54 INCOMPLETE QUADRIPLEGIA AT C5-C8 LEVEL (HCC): ICD-10-CM

## 2022-02-14 DIAGNOSIS — G62.9 PERIPHERAL POLYNEUROPATHY: ICD-10-CM

## 2022-02-14 DIAGNOSIS — M48.062 SPINAL STENOSIS, LUMBAR REGION, WITH NEUROGENIC CLAUDICATION: ICD-10-CM

## 2022-02-14 DIAGNOSIS — M54.12 CERVICAL RADICULOPATHY: Primary | ICD-10-CM

## 2022-02-14 DIAGNOSIS — Z12.31 VISIT FOR SCREENING MAMMOGRAM: ICD-10-CM

## 2022-02-14 PROCEDURE — 99214 OFFICE O/P EST MOD 30 MIN: CPT | Performed by: NURSE PRACTITIONER

## 2022-02-14 PROCEDURE — 77063 BREAST TOMOSYNTHESIS BI: CPT

## 2022-02-14 RX ORDER — MORPHINE SULFATE 15 MG/1
15 TABLET, FILM COATED, EXTENDED RELEASE ORAL EVERY 12 HOURS
Qty: 60 TABLET | Refills: 0 | Status: SHIPPED | OUTPATIENT
Start: 2022-02-14 | End: 2022-03-16 | Stop reason: SDUPTHER

## 2022-02-14 ASSESSMENT — ENCOUNTER SYMPTOMS
GASTROINTESTINAL NEGATIVE: 1
EYES NEGATIVE: 1
BACK PAIN: 1
RESPIRATORY NEGATIVE: 1

## 2022-02-14 NOTE — PROGRESS NOTES
[address] [facility] [address]  Dept: [phone]  Dept Fax: [phone]: [phone]    Visit Date: 2/14/2022    Functionality Assessment/Goals Worksheet     On a scale of 0 (Does not Interfere) to 10 (Completely Interferes)     1. Which number describes how during the past week pain has interfered with       the following:  A. General Activity:  9  B. Mood: 10  C. Walking Ability:  10  D. Normal Work (Includes both work outside the home and housework):  10  E. Relations with Other People:   8  F. Sleep:   10  G. Enjoyment of Life:   10    2. Patient Prefers to Take their Pain Medications:     [x]  On a regular basis   []  Only when necessary    []  Does not take pain medications    3. What are the Patient's Goals/Expectations for Visiting Pain Management? []  Learn about my pain    [x]  Receive Medication   []  Physical Therapy     []  Treat Depression   [x]  Receive Injections    []  Treat Sleep   []  Deal with Anxiety and Stress   []  Treat Opoid Dependence/Addiction   []  Other:      HPI:   Rehan Vu is a 61 y.o. female is here today for    Chief Complaint: Neck pain, hand numbness, leg pain, low back pain     HPI   4 month FU. Continues to have pain in posterior neck neck with numbness and tingling in hands. Pain up and down  Still awaiting scheduling with permanant cervical SCS with Dr. Martir Jhaveri. Still awaiting testing. States plan end of March or in April. Has been following with Dr. Martir Jhaveri     Has complaint of continues low back pain- sharp and numbness and tingling in legs. Has been elevated as SCS is not working. States communication failed read on remote. Has not been able to use since Christmas . I called Michael Miramontes with [employer] and he is coming in office to meet with patient. Pain medications help make pain tolerable.  Needing tylenol in between and Ibuprofen when pain increases. Pain increases with bending, lifting, twisting , turning torso, reaching, pushing, pulling, turning head, walking, standing, raising arms, getting up and down and housework or working at job weather changes       Medications reviewed. Patient denies side effects with medications. Patient states she is taking medications as prescribed. Shedenies receiving pain medications from other sources. She denies any ER visits since last visit. Pain scale with out pain medications or at its worst is 10/10. Pain scale with pain medications or at its best is 4/10. Last dose of MS ER, Norco, and neurontin was today   Drug screen reviewed from 9/27/2021 and was appropriate  Pill count completed  today and WNL: Yes      The patientis allergic to ibuprofen. Subjective:      Review of Systems   Constitutional: Negative. Negative for activity change. HENT: Negative. Eyes: Negative. Respiratory: Negative. Cardiovascular: Negative. Gastrointestinal: Negative. Genitourinary: Negative. Musculoskeletal: Positive for arthralgias, back pain, gait problem, joint swelling, myalgias, neck pain and neck stiffness. Ambulating with quad cane    Skin: Negative. Neurological: Positive for weakness, numbness and headaches. Psychiatric/Behavioral: Positive for sleep disturbance. Negative for dysphoric mood. The patient is not nervous/anxious. Objective:     Vitals:    02/14/22 0946   BP: 108/64   Weight: 159 lb (72.1 kg)   Height: 5' 7\" (1.702 m)       Physical Exam  Vitals and nursing note reviewed. Constitutional:       General: She is not in acute distress. Appearance: She is well-developed. She is not diaphoretic. HENT:      Head: Normocephalic and atraumatic. Right Ear: External ear normal.      Left Ear: External ear normal.      Nose: Nose normal.      Mouth/Throat:      Pharynx: No oropharyngeal exudate. Eyes:      General: No scleral icterus. Right eye: No discharge. Left eye: No discharge. Conjunctiva/sclera: Conjunctivae normal.      Pupils: Pupils are equal, round, and reactive to light. Neck:      Thyroid: No thyromegaly. Cardiovascular:      Rate and Rhythm: Normal rate and regular rhythm. Heart sounds: Normal heart sounds. No murmur heard. No friction rub. No gallop. Pulmonary:      Effort: Pulmonary effort is normal. No respiratory distress. Breath sounds: Normal breath sounds. No wheezing or rales. Chest:      Chest wall: No tenderness. Abdominal:      General: Bowel sounds are normal. There is no distension. Palpations: Abdomen is soft. Tenderness: There is no abdominal tenderness. There is no guarding or rebound. Musculoskeletal:         General: Tenderness present. Right shoulder: Tenderness present. Decreased range of motion. Left shoulder: Tenderness present. Decreased range of motion. Right upper arm: Tenderness present. Left upper arm: Tenderness present. Right elbow: Tenderness present. Left elbow: Tenderness present. Right forearm: Tenderness present. Left forearm: Tenderness present. Right wrist: Tenderness present. Left wrist: Tenderness present. Right hand: Tenderness present. Decreased strength. Left hand: Tenderness present. Decreased strength. Cervical back: Full passive range of motion without pain, normal range of motion and neck supple. Spasms and tenderness present. No edema, erythema or rigidity. No muscular tenderness. Normal range of motion. Thoracic back: Tenderness present. Lumbar back: Spasms and tenderness present. Back:       Right hip: Tenderness present. Left hip: Tenderness present. Right upper leg: Tenderness present. Left upper leg: Tenderness present. Right knee: Tenderness present. Left knee: Swelling present. Decreased range of motion. Tenderness present. Right lower leg: Swelling and tenderness present. Edema present. Left lower leg: Swelling and tenderness present. Edema present. Right ankle: Swelling present. Tenderness present. Left ankle: Swelling present. Tenderness present. Decreased range of motion. Right foot: Swelling and tenderness present. Left foot: Decreased range of motion. Swelling and tenderness present. Skin:     General: Skin is warm. Coloration: Skin is not pale. Findings: No erythema or rash. Neurological:      Mental Status: She is alert and oriented to person, place, and time. She is not disoriented. Cranial Nerves: No cranial nerve deficit. Sensory: Sensory deficit present. Motor: Weakness present. No atrophy or abnormal muscle tone. Coordination: Coordination normal.      Gait: Gait normal.      Deep Tendon Reflexes: Babinski sign absent on the right side. Reflex Scores:       Tricep reflexes are 1+ on the right side and 1+ on the left side. Bicep reflexes are 1+ on the right side and 1+ on the left side. Brachioradialis reflexes are 1+ on the right side and 1+ on the left side. Patellar reflexes are 2+ on the right side and 2+ on the left side. Achilles reflexes are 2+ on the right side and 2+ on the left side. Comments: SLR -  Motor 4/5 LUE LLE RUE 4/5 RLE 4/5   Psychiatric:         Attention and Perception: She is attentive. Mood and Affect: Mood is not anxious or depressed. Affect is not labile, blunt, angry or inappropriate. Speech: Speech normal.         Behavior: Behavior normal. Behavior is not agitated, slowed, aggressive, withdrawn, hyperactive or combative. Thought Content: Thought content normal. Thought content is not paranoid or delusional. Thought content does not include homicidal or suicidal ideation. Thought content does not include homicidal or suicidal plan.          Cognition and Memory: Memory is not impaired. She does not exhibit impaired recent memory or impaired remote memory. Judgment: Judgment normal. Judgment is not impulsive or inappropriate. DALTON  Patricks test  positive  Yeoman's  positive  Gaenslen's  positive          Assessment:     1. Cervical radiculopathy    2. Spondylosis, cervical, with myelopathy    3. Neuropathy    4. Lumbar radiculitis    5. Spinal stenosis, lumbar region, with neurogenic claudication    6. Peripheral polyneuropathy    7. Incomplete quadriplegia at C5-C8 level ([facility] [address]    8. Chronic pain syndrome    9. Pain syndrome, chronic            Plan:      · OARRS reviewed. Current MED: 45.00  · Patient was offered naloxone for home. · Discussed long term side effects of medications, tolerance, dependency and addiction. · Previous UDS reviewed  · UDS preformed today for compliance. · Patient told can not receive any pain medications from any other source. · No evidence of abuse, diversion or aberrant behavior.  Medications and/or procedures to improve function and quality of life- patient understanding with this and that may not be pain free   Discussed with patient about safe storage of medications at home   Discussed possible weaning of medication dosing dependent on treatment/procedure results.  Discussed with patient about risks with procedure including infection, reaction to medication, increased pain, or bleeding. · Procedure notes reveiwed   · Received 85% relief of hand numbness and neck pain from cervical SCS trial    Awaiting permanent SCS with Dr. Melody Randolph Having issues with lumbar SCS- not working. Arnie gomez with [employer] is coming in today to help resolve this   · Medications remain effective- Continue MS Contin 15 mg BID scheduled for maintenance pain - ordered refill.  Norco 5/325 TID prn - filled 1/27/2022  · Continue Neurontin 800 TID- filled 12/9/2021 for 90 days   · Patient is compliant. Updated UDS ordered today.    · Again discussed weaning down pain medications when relief from SCS. Instructed to keep me posted with Dr. Martir Jhaveri and permanent SCS       Meds. Prescribed:   Orders Placed This Encounter   Medications    morphine (MS CONTIN) 15 MG extended release tablet     Sig: Take 1 tablet by mouth every 12 hours for 30 days. Dispense:  60 tablet     Refill:  0     Reduce doses taken as pain becomes manageable       Return in about 3 months (around 5/14/2022), or if symptoms worsen or fail to improve, for follow up  for medications.                Electronically signed by FADUMO Alfaro CNP on2/14/2022 at 10:27 AM

## 2022-02-21 RX ORDER — HYDROCODONE BITARTRATE AND ACETAMINOPHEN 5; 325 MG/1; MG/1
1 TABLET ORAL EVERY 8 HOURS PRN
Qty: 90 TABLET | Refills: 0 | Status: SHIPPED | OUTPATIENT
Start: 2022-02-26 | End: 2022-04-04 | Stop reason: SDUPTHER

## 2022-02-21 NOTE — TELEPHONE ENCOUNTER
OARRS reviewed. UDS: + for  Hydrocodone morphine gabapentin duloxetine consistent. Last seen: 9/27/2021.  Follow-up:   Future Appointments   Date Time Provider Kaleb Camargo   5/16/2022 11:00 AM FADUMO Jane - CNP N [facility] - [address] [facility]

## 2022-02-24 DIAGNOSIS — G89.29 OTHER CHRONIC PAIN: ICD-10-CM

## 2022-02-25 RX ORDER — GABAPENTIN 800 MG/1
TABLET ORAL
Qty: 270 TABLET | Refills: 0 | OUTPATIENT
Start: 2022-02-25

## 2022-03-04 DIAGNOSIS — G89.29 OTHER CHRONIC PAIN: ICD-10-CM

## 2022-03-04 NOTE — TELEPHONE ENCOUNTER
Lazaro Walden called requesting a refill on the following medications:  Requested Prescriptions     Pending Prescriptions Disp Refills    gabapentin (NEURONTIN) 800 MG tablet 270 tablet 0     Sig: Take 1 tablet by mouth 3 times daily for 90 days.      [facility]    Date of last visit: 2/14/22  Date of next visit (if applicable): 1/28/7989

## 2022-03-07 RX ORDER — GABAPENTIN 800 MG/1
800 TABLET ORAL 3 TIMES DAILY
Qty: 270 TABLET | Refills: 0 | Status: SHIPPED | OUTPATIENT
Start: 2022-03-10 | End: 2022-06-08

## 2022-03-07 NOTE — TELEPHONE ENCOUNTER
OARRS reviewed. UDS: + for  Hydrocodone gabapentin morphine duloxetine consistent. Last seen: 2/14/2022.  Follow-up:   Future Appointments   Date Time Provider Kaleb Camargo   5/16/2022 11:00 AM FADUMO Simpson - CNP N [facility] - [address] [facility]

## 2022-03-16 DIAGNOSIS — G89.4 PAIN SYNDROME, CHRONIC: ICD-10-CM

## 2022-03-16 RX ORDER — MORPHINE SULFATE 15 MG/1
15 TABLET, FILM COATED, EXTENDED RELEASE ORAL EVERY 12 HOURS
Qty: 60 TABLET | Refills: 0 | Status: SHIPPED | OUTPATIENT
Start: 2022-03-16 | End: 2022-04-13 | Stop reason: SDUPTHER

## 2022-03-16 NOTE — TELEPHONE ENCOUNTER
OARRS reviewed. UDS: + for  Hydrocodone, cymbalta, morphine, gabapentin   Last seen: 2/14/2022.  Follow-up:   Future Appointments   Date Time Provider Kaleb Mary Jo   5/16/2022 11:00 AM FADUMO Marina - CNP [facility] - [address]

## 2022-03-16 NOTE — TELEPHONE ENCOUNTER
Shlomo Hodgkins called requesting a refill on the following medications:  Requested Prescriptions     Pending Prescriptions Disp Refills    morphine (MS CONTIN) 15 MG extended release tablet 60 tablet 0     Sig: Take 1 tablet by mouth every 12 hours for 30 days. [facility]  .       Date of last visit: 2/14/2022  Date of next visit (if applicable): 4/26/9684

## 2022-03-31 DIAGNOSIS — G89.29 OTHER CHRONIC PAIN: ICD-10-CM

## 2022-03-31 DIAGNOSIS — G89.4 PAIN SYNDROME, CHRONIC: ICD-10-CM

## 2022-03-31 NOTE — TELEPHONE ENCOUNTER
Shelby Baugh called requesting a refill on the following medications:  Requested Prescriptions     Refused Prescriptions Disp Refills    gabapentin (NEURONTIN) 800 MG tablet [Pharmacy Med Name: Gabapentin Oral Tablet 800 MG] 270 tablet 0     Sig: TAKE ONE TABLET BY MOUTH THREE TIMES A DAY     Refused By: Yanet Armstrong     Reason for Refusal: Patient has requested refill too soon     Pharmacy verified:  [email or web address]      Date of last visit:   Date of next visit (if applicable): 6/6/8773

## 2022-03-31 NOTE — TELEPHONE ENCOUNTER
Sole Delacruz called requesting a refill on the following medications:  Requested Prescriptions     Pending Prescriptions Disp Refills    HYDROcodone-acetaminophen (NORCO) 5-325 MG per tablet 90 tablet 0     Sig: Take 1 tablet by mouth every 8 hours as needed for Pain for up to 30 days.      Pharmacy verified:  [email or web address]  [facility], [address] [place] [address] [phone] Kamari Muñoz [phone]    Date of last visit: 02/14/2022  Date of next visit (if applicable): 8/45/1659

## 2022-04-04 RX ORDER — HYDROCODONE BITARTRATE AND ACETAMINOPHEN 5; 325 MG/1; MG/1
1 TABLET ORAL EVERY 8 HOURS PRN
Qty: 90 TABLET | Refills: 0 | Status: SHIPPED | OUTPATIENT
Start: 2022-04-04 | End: 2022-05-09 | Stop reason: SDUPTHER

## 2022-04-04 NOTE — TELEPHONE ENCOUNTER
OARRS reviewed. UDS: + for Hydrocodone, Cymbalta, Gabapentin, Morphine. Last seen: 2/14/2022.  Follow-up:   Future Appointments   Date Time Provider Kaleb Camargo   5/16/2022 11:00 AM FADUMO Garcia - CNP [facility] - [place]

## 2022-04-13 DIAGNOSIS — G89.4 PAIN SYNDROME, CHRONIC: ICD-10-CM

## 2022-04-13 RX ORDER — MORPHINE SULFATE 15 MG/1
15 TABLET, FILM COATED, EXTENDED RELEASE ORAL EVERY 12 HOURS
Qty: 60 TABLET | Refills: 0 | Status: SHIPPED | OUTPATIENT
Start: 2022-04-15 | End: 2022-05-13 | Stop reason: SDUPTHER

## 2022-04-13 NOTE — TELEPHONE ENCOUNTER
OARRS reviewed. UDS: + for  Hydrocodone, Duloxetine, Morphine -consistent. Last seen: 2/14/2022.  Follow-up:   Future Appointments   Date Time Provider Kaleb Mary Jo   5/16/2022 11:00 AM FADUMO Redding - CNP [facility] - [place]

## 2022-04-13 NOTE — TELEPHONE ENCOUNTER
Tim Barrett called requesting a refill on the following medications:  Requested Prescriptions     Pending Prescriptions Disp Refills    morphine (MS CONTIN) 15 MG extended release tablet 60 tablet 0     Sig: Take 1 tablet by mouth every 12 hours for 30 days. Pharmacy verified: [facility] in [place]  . pv      Date of last visit: 2/14/2022  Date of next visit (if applicable): 9/29/3267

## 2022-04-26 ENCOUNTER — TELEPHONE (OUTPATIENT)
Dept: PHYSICAL MEDICINE AND REHAB | Age: 57
End: 2022-04-26

## 2022-04-26 NOTE — TELEPHONE ENCOUNTER
Pt. Returned call. Follow up appointment scheduled with Dr. Chula Villeda 5/11/2022 @ 3:45pm. Verbalized understanding.

## 2022-04-26 NOTE — TELEPHONE ENCOUNTER
Pt. Will need an appointment for evaluation and office notes to submit for insurance. LVM to call the office to schedule appointment.

## 2022-04-26 NOTE — TELEPHONE ENCOUNTER
Contacted by [facility]. Pt. needs and IPG swap due to her IPG not holding a charge. Please advise how to proceed or what her next  Step should be.  Thanks

## 2022-05-06 DIAGNOSIS — G89.4 PAIN SYNDROME, CHRONIC: ICD-10-CM

## 2022-05-06 DIAGNOSIS — G89.29 OTHER CHRONIC PAIN: ICD-10-CM

## 2022-05-09 RX ORDER — HYDROCODONE BITARTRATE AND ACETAMINOPHEN 5; 325 MG/1; MG/1
1 TABLET ORAL EVERY 8 HOURS PRN
Qty: 90 TABLET | Refills: 0 | Status: SHIPPED | OUTPATIENT
Start: 2022-05-09 | End: 2022-06-08

## 2022-05-09 NOTE — TELEPHONE ENCOUNTER
OARRS reviewed. UDS: + for Gabapentin, Duloxetine, Hydrocodone, Morphine  Last seen: 2/14/2022.  Follow-up:   Future Appointments   Date Time Provider Kaleb Camargo   6/1/2022  3:30 PM Ryanne Medel MD [facility] - [place]   6/15/2022  1:00 PM FADUMO Heredia - CNP [facility] [place]

## 2022-05-12 DIAGNOSIS — G89.4 PAIN SYNDROME, CHRONIC: ICD-10-CM

## 2022-05-12 NOTE — TELEPHONE ENCOUNTER
Boris Stacy called requesting a refill on the following medications:  Requested Prescriptions     Pending Prescriptions Disp Refills    morphine (MS CONTIN) 15 MG extended release tablet 60 tablet 0     Sig: Take 1 tablet by mouth every 12 hours for 30 days. Pharmacy verified:[facility]  . pv      Date of last visit:   Date of next visit (if applicable): 3/4/4564

## 2022-05-13 RX ORDER — MORPHINE SULFATE 15 MG/1
15 TABLET, FILM COATED, EXTENDED RELEASE ORAL EVERY 12 HOURS
Qty: 60 TABLET | Refills: 0 | Status: SHIPPED | OUTPATIENT
Start: 2022-05-16 | End: 2022-06-14 | Stop reason: SDUPTHER

## 2022-05-13 NOTE — TELEPHONE ENCOUNTER
OARRS reviewed. UDS: + for  Gabapentin, Duloxetine, Hydrocodone, Morphine -consistent. Last seen: 2/14/2022.  Follow-up:  Future Appointments   Date Time Provider Kaleb Camargo   6/1/2022  3:30 PM Marilee Contreras MD N [facility] - [place]   6/15/2022  1:00 PM FADUMO Arshad - CNP N [facility] - [place]

## 2022-06-01 ENCOUNTER — OFFICE VISIT (OUTPATIENT)
Dept: PHYSICAL MEDICINE AND REHAB | Age: 57
End: 2022-06-01
Payer: MEDICARE

## 2022-06-01 VITALS
WEIGHT: 145.2 LBS | HEIGHT: 67 IN | BODY MASS INDEX: 22.79 KG/M2 | DIASTOLIC BLOOD PRESSURE: 64 MMHG | SYSTOLIC BLOOD PRESSURE: 122 MMHG

## 2022-06-01 DIAGNOSIS — M54.16 LUMBAR RADICULITIS: ICD-10-CM

## 2022-06-01 DIAGNOSIS — M48.062 SPINAL STENOSIS, LUMBAR REGION, WITH NEUROGENIC CLAUDICATION: ICD-10-CM

## 2022-06-01 DIAGNOSIS — F11.90 CHRONIC, CONTINUOUS USE OF OPIOIDS: ICD-10-CM

## 2022-06-01 DIAGNOSIS — M54.12 CERVICAL RADICULOPATHY: Primary | ICD-10-CM

## 2022-06-01 DIAGNOSIS — G82.54 INCOMPLETE QUADRIPLEGIA AT C5-C8 LEVEL (HCC): ICD-10-CM

## 2022-06-01 DIAGNOSIS — G62.9 NEUROPATHY: ICD-10-CM

## 2022-06-01 DIAGNOSIS — M47.12 SPONDYLOSIS, CERVICAL, WITH MYELOPATHY: ICD-10-CM

## 2022-06-01 DIAGNOSIS — G89.4 CHRONIC PAIN SYNDROME: ICD-10-CM

## 2022-06-01 DIAGNOSIS — G62.9 PERIPHERAL POLYNEUROPATHY: ICD-10-CM

## 2022-06-01 PROCEDURE — 99214 OFFICE O/P EST MOD 30 MIN: CPT | Performed by: PAIN MEDICINE

## 2022-06-01 RX ORDER — HYDROCODONE BITARTRATE AND ACETAMINOPHEN 5; 325 MG/1; MG/1
1 TABLET ORAL EVERY 8 HOURS PRN
Qty: 90 TABLET | Refills: 0 | Status: SHIPPED | OUTPATIENT
Start: 2022-06-07 | End: 2022-07-08 | Stop reason: SDUPTHER

## 2022-06-01 RX ORDER — GABAPENTIN 800 MG/1
800 TABLET ORAL 3 TIMES DAILY
Qty: 270 TABLET | Refills: 0 | Status: SHIPPED | OUTPATIENT
Start: 2022-06-01 | End: 2022-08-22 | Stop reason: SDUPTHER

## 2022-06-01 ASSESSMENT — ENCOUNTER SYMPTOMS
WHEEZING: 0
SHORTNESS OF BREATH: 0
EYES NEGATIVE: 1
SORE THROAT: 0
VOMITING: 0
RHINORRHEA: 0
COLOR CHANGE: 0
EYE PAIN: 0
ABDOMINAL PAIN: 0
GASTROINTESTINAL NEGATIVE: 1
NAUSEA: 0
RESPIRATORY NEGATIVE: 1
SINUS PRESSURE: 0
COUGH: 0
CHEST TIGHTNESS: 0
BACK PAIN: 1
CONSTIPATION: 0
PHOTOPHOBIA: 0
DIARRHEA: 0

## 2022-06-01 NOTE — PROGRESS NOTES
[facility] [address]  Dept: [phone]  Dept Fax: [phone]: [phone]    Visit Date: 6/1/2022    Functionality Assessment/Goals Worksheet     On a scale of 0 (Does not Interfere) to 10 (Completely Interferes)     1. Which number describes how during the past week pain has interfered with       the following:  A. General Activity:  9  B. Mood: 8  C. Walking Ability:  8  D. Normal Work (Includes both work outside the home and housework):  10  E. Relations with Other People:   10  F. Sleep:   9  G. Enjoyment of Life:   9    2. Patient Prefers to Take their Pain Medications:     []  On a regular basis   []  Only when necessary    []  Does not take pain medications    3. What are the Patient's Goals/Expectations for Visiting Pain Management? []  Learn about my pain    []  Receive Medication   []  Physical Therapy     []  Treat Depression   []  Receive Injections    []  Treat Sleep   []  Deal with Anxiety and Stress   []  Treat Opoid Dependence/Addiction   []  Other:      HPI:   Antoinette Lee is a 61 y.o. female is here today for    Chief Complaint: Low back pain and Neck pain    HPI     S/P Permanent  SCS insertion per Dr Amadeo Perez  On May 11,22. SCS not on yet until healing better. Neg chill/fever. States SCS for lower back and BLE helps. Patient would like IPG moved from right buttock to lower back region. Present IPG end of life    Pain increases with bending, lifting, twisting , turning torso, reaching, pushing, pulling, turning head, walking, standing, raising arms, getting up and down and housework or working at job weather changes         Medications reviewed. Patient denies side effects with medications. Patient states she is taking medications as prescribed. Shedenies receiving pain medications from other sources.  She denies any ER visits since last visit. Pain scale with out pain medications/SCS or at its worst is 10/10. Pain scale with pain medications/SCS or at its best is 4/10. Last dose of MsContin and Norco was today   Drug screen reviewed from 2/14/22 and was appropriate  Pill count completed  today and WNL: Yes      The patientis allergic to ibuprofen. Subjective:      Review of Systems   Constitutional: Negative. Negative for activity change, appetite change, chills, diaphoresis, fatigue, fever and unexpected weight change. HENT: Negative. Negative for congestion, ear pain, hearing loss, mouth sores, nosebleeds, rhinorrhea, sinus pressure and sore throat. Eyes: Negative. Negative for photophobia, pain and visual disturbance. Respiratory: Negative. Negative for cough, chest tightness, shortness of breath and wheezing. Cardiovascular: Negative. Negative for chest pain and palpitations. Gastrointestinal: Negative. Negative for abdominal pain, constipation, diarrhea, nausea and vomiting. Endocrine: Negative for cold intolerance, heat intolerance, polydipsia, polyphagia and polyuria. Genitourinary: Negative. Negative for decreased urine volume, difficulty urinating, frequency and hematuria. Musculoskeletal: Positive for arthralgias, back pain, gait problem, joint swelling, myalgias, neck pain and neck stiffness. Ambulating with quad cane    Skin: Negative. Negative for color change and rash. Allergic/Immunologic: Negative for food allergies and immunocompromised state. Neurological: Positive for weakness, numbness and headaches. Negative for dizziness, tremors, seizures, syncope, facial asymmetry, speech difficulty and light-headedness. Hematological: Does not bruise/bleed easily. Psychiatric/Behavioral: Positive for sleep disturbance. Negative for agitation, behavioral problems, confusion, decreased concentration, dysphoric mood, hallucinations, self-injury and suicidal ideas.  The patient is not nervous/anxious and is not hyperactive. Objective:     Vitals:    06/01/22 1511   BP: 122/64   Weight: 159 lb (72.1 kg)   Height: 5' 7\" (1.702 m)       Physical Exam  Vitals and nursing note reviewed. Constitutional:       General: She is not in acute distress. Appearance: She is well-developed. She is not diaphoretic. HENT:      Head: Normocephalic and atraumatic. Right Ear: External ear normal.      Left Ear: External ear normal.      Nose: Nose normal.      Mouth/Throat:      Pharynx: No oropharyngeal exudate. Eyes:      General: No scleral icterus. Right eye: No discharge. Left eye: No discharge. Conjunctiva/sclera: Conjunctivae normal.      Pupils: Pupils are equal, round, and reactive to light. Neck:      Thyroid: No thyromegaly. Cardiovascular:      Rate and Rhythm: Normal rate and regular rhythm. Heart sounds: Normal heart sounds. No murmur heard. No friction rub. No gallop. Pulmonary:      Effort: Pulmonary effort is normal. No respiratory distress. Breath sounds: Normal breath sounds. No wheezing or rales. Chest:      Chest wall: No tenderness. Abdominal:      General: Bowel sounds are normal. There is no distension. Palpations: Abdomen is soft. Tenderness: There is no abdominal tenderness. There is no guarding or rebound. Musculoskeletal:         General: Tenderness present. Right shoulder: Tenderness present. Decreased range of motion. Left shoulder: Tenderness present. Decreased range of motion. Right upper arm: Tenderness present. Left upper arm: Tenderness present. Right elbow: Tenderness present. Left elbow: Tenderness present. Right forearm: Tenderness present. Left forearm: Tenderness present. Right wrist: Tenderness present. Left wrist: Tenderness present. Right hand: Tenderness present. Decreased strength. Left hand: Tenderness present. Decreased strength. Cervical back: Full passive range of motion without pain, normal range of motion and neck supple. Spasms and tenderness present. No edema, erythema or rigidity. No muscular tenderness. Normal range of motion. Thoracic back: Tenderness present. Lumbar back: Spasms and tenderness present. Back:       Right hip: Tenderness present. Left hip: Tenderness present. Right upper leg: Tenderness present. Left upper leg: Tenderness present. Right knee: Tenderness present. Left knee: Swelling present. Decreased range of motion. Tenderness present. Right lower leg: Swelling and tenderness present. Edema present. Left lower leg: Swelling and tenderness present. Edema present. Right ankle: Swelling present. Tenderness present. Left ankle: Swelling present. Tenderness present. Decreased range of motion. Right foot: Swelling and tenderness present. Left foot: Decreased range of motion. Swelling and tenderness present. Skin:     General: Skin is warm. Coloration: Skin is not pale. Findings: No erythema or rash. Neurological:      Mental Status: She is alert and oriented to person, place, and time. She is not disoriented. Cranial Nerves: No cranial nerve deficit. Sensory: Sensory deficit present. Motor: Weakness present. No atrophy or abnormal muscle tone. Coordination: Coordination normal.      Gait: Gait normal.      Deep Tendon Reflexes: Babinski sign absent on the right side. Reflex Scores:       Tricep reflexes are 1+ on the right side and 1+ on the left side. Bicep reflexes are 1+ on the right side and 1+ on the left side. Brachioradialis reflexes are 1+ on the right side and 1+ on the left side. Patellar reflexes are 2+ on the right side and 2+ on the left side. Achilles reflexes are 2+ on the right side and 2+ on the left side.      Comments: SLR -  Motor 4/5 LUE LLE RUE 4/5 RLE 4/5 Psychiatric:         Attention and Perception: She is attentive. Mood and Affect: Mood is not anxious or depressed. Affect is not labile, blunt, angry or inappropriate. Speech: Speech normal.         Behavior: Behavior normal. Behavior is not agitated, slowed, aggressive, withdrawn, hyperactive or combative. Thought Content: Thought content normal. Thought content is not paranoid or delusional. Thought content does not include homicidal or suicidal ideation. Thought content does not include homicidal or suicidal plan. Cognition and Memory: Memory is not impaired. She does not exhibit impaired recent memory or impaired remote memory. Judgment: Judgment normal. Judgment is not impulsive or inappropriate. DALTON  Patricks test  positive  Yeoman's  or [place];s positive  Kemps  positive  Spurlings  na       Assessment:     1. Cervical radiculopathy    2. Spondylosis, cervical, with myelopathy    3. Lumbar radiculitis    4. Spinal stenosis, lumbar region, with neurogenic claudication    5. Peripheral polyneuropathy    6. Incomplete quadriplegia at C5-C8 level ([facility] [address]    7. Neuropathy    8. Chronic pain syndrome    9. Chronic, continuous use of opioids            Plan:      · OARRS reviewed. Current MED: 45  · Patient was offered naloxone for home. · Discussed long term side effects of medications, tolerance, dependency and addiction. · Previous UDS reviewed  · UDS preformed today for compliance. · Patient told can not receive any pain medications from any other source. · No evidence of abuse, diversion or aberrant behavior.  Medications and/or procedures to improve function and quality of life- patient understanding with this and that may not be pain free   Discussed with patient about safe storage of medications at home   Discussed possible weaning of medication dosing dependent on treatment/procedure results.     Testing: None ordered   Procedures: Needs right buttock IPG replace, end of life.  Discussed with patient about risks with procedure including infection, reaction to medication, increased pain, or bleeding.  Medications:Reviewed   If patient is on blood thinners will need approval to hold yes or no:   Does patient have implanted device Stimulator, AICD or Pacemaker etc?     Meds. Prescribed:   Orders Placed This Encounter   Medications    gabapentin (NEURONTIN) 800 MG tablet     Sig: Take 1 tablet by mouth 3 times daily for 90 days. Dispense:  270 tablet     Refill:  0    HYDROcodone-acetaminophen (NORCO) 5-325 MG per tablet     Sig: Take 1 tablet by mouth every 8 hours as needed for Pain for up to 30 days. Take lowest dose possible to manage pain     Dispense:  90 tablet     Refill:  0     Reduce doses taken as pain becomes manageable       Return in about 2 months (around 8/1/2022), or Andrea Lambert, for F/U pain meds. re- evaluation. .               Electronically signed by Ryanne Medel MD on6/1/2022 at 3:51 PM

## 2022-06-08 ENCOUNTER — TELEPHONE (OUTPATIENT)
Dept: PHYSICAL MEDICINE AND REHAB | Age: 57
End: 2022-06-08

## 2022-06-08 DIAGNOSIS — M54.12 CERVICAL RADICULOPATHY: Primary | ICD-10-CM

## 2022-06-08 DIAGNOSIS — M54.16 LUMBAR RADICULITIS: ICD-10-CM

## 2022-06-08 DIAGNOSIS — M47.12 SPONDYLOSIS, CERVICAL, WITH MYELOPATHY: ICD-10-CM

## 2022-06-08 DIAGNOSIS — M48.062 SPINAL STENOSIS, LUMBAR REGION, WITH NEUROGENIC CLAUDICATION: ICD-10-CM

## 2022-06-14 DIAGNOSIS — G89.4 PAIN SYNDROME, CHRONIC: ICD-10-CM

## 2022-06-14 RX ORDER — MORPHINE SULFATE 15 MG/1
15 TABLET, FILM COATED, EXTENDED RELEASE ORAL EVERY 12 HOURS
Qty: 60 TABLET | Refills: 0 | Status: SHIPPED | OUTPATIENT
Start: 2022-06-16 | End: 2022-07-15 | Stop reason: SDUPTHER

## 2022-06-14 NOTE — TELEPHONE ENCOUNTER
OARRS reviewed. UDS: + for  Gabapentin, Duloxetine, Hydrocodone, Morphine -consistent. Last seen: 2/14/2022.  Follow-up: 8/8/2022

## 2022-06-14 NOTE — TELEPHONE ENCOUNTER
Brian Both called requesting a refill on the following medications:  Requested Prescriptions     Pending Prescriptions Disp Refills    morphine (MS CONTIN) 15 MG extended release tablet 60 tablet 0     Sig: Take 1 tablet by mouth every 12 hours for 30 days. [facility] [address]  . pv      Date of last visit: 6/1/22  Date of next visit (if applicable): 3/8/4636

## 2022-07-06 DIAGNOSIS — M54.12 CERVICAL RADICULOPATHY: ICD-10-CM

## 2022-07-06 DIAGNOSIS — G62.9 PERIPHERAL POLYNEUROPATHY: ICD-10-CM

## 2022-07-06 DIAGNOSIS — G82.54 INCOMPLETE QUADRIPLEGIA AT C5-C8 LEVEL (HCC): ICD-10-CM

## 2022-07-06 DIAGNOSIS — G89.4 CHRONIC PAIN SYNDROME: ICD-10-CM

## 2022-07-06 DIAGNOSIS — G62.9 NEUROPATHY: ICD-10-CM

## 2022-07-06 DIAGNOSIS — M48.062 SPINAL STENOSIS, LUMBAR REGION, WITH NEUROGENIC CLAUDICATION: ICD-10-CM

## 2022-07-06 DIAGNOSIS — F11.90 CHRONIC, CONTINUOUS USE OF OPIOIDS: ICD-10-CM

## 2022-07-06 DIAGNOSIS — M54.16 LUMBAR RADICULITIS: ICD-10-CM

## 2022-07-06 DIAGNOSIS — M47.12 SPONDYLOSIS, CERVICAL, WITH MYELOPATHY: ICD-10-CM

## 2022-07-06 NOTE — TELEPHONE ENCOUNTER
OARRS reviewed. UDS: + for Gabapentin, Duloxetine, Hydrocodone, Morphine. Last seen: 2/14/2022. Follow-up:   Future Appointments   Date Time Provider Kaleb Mary Jo   8/8/2022 12:00 PM Penny Umaña, FADUMO - CNP N [facility] [address]     **Patient got a Rx of [place] on 7/5/22 for 7 days from [facility]. Patient was not due until 7/8 but 7 more days added for new rx I pushed out to 7/15/22.

## 2022-07-06 NOTE — TELEPHONE ENCOUNTER
Nupur Parks called requesting a refill on the following medications:  Requested Prescriptions     Pending Prescriptions Disp Refills    HYDROcodone-acetaminophen (NORCO) 5-325 MG per tablet 90 tablet 0     Sig: Take 1 tablet by mouth every 8 hours as needed for Pain for up to 30 days. Take lowest dose possible to manage pain     Pharmacy verified: [facility] [place]  . pv      Date of last visit: 6/01/2022  Date of next visit (if applicable): 7/3/5292

## 2022-07-08 RX ORDER — HYDROCODONE BITARTRATE AND ACETAMINOPHEN 5; 325 MG/1; MG/1
1 TABLET ORAL EVERY 8 HOURS PRN
Qty: 90 TABLET | Refills: 0 | Status: SHIPPED | OUTPATIENT
Start: 2022-07-08 | End: 2022-08-22 | Stop reason: SDUPTHER

## 2022-07-13 DIAGNOSIS — G89.4 PAIN SYNDROME, CHRONIC: ICD-10-CM

## 2022-07-13 NOTE — TELEPHONE ENCOUNTER
Sridhar Hernandez called requesting a refill on the following medications:  Requested Prescriptions     Pending Prescriptions Disp Refills    morphine (MS CONTIN) 15 MG extended release tablet 60 tablet 0     Sig: Take 1 tablet by mouth every 12 hours for 30 days. Pharmacy verified: [facility]  . pv      Date of last visit: 6/1/2022  Date of next visit (if applicable): 7/0/8010

## 2022-07-15 RX ORDER — MORPHINE SULFATE 15 MG/1
15 TABLET, FILM COATED, EXTENDED RELEASE ORAL EVERY 12 HOURS
Qty: 60 TABLET | Refills: 0 | Status: SHIPPED | OUTPATIENT
Start: 2022-07-16 | End: 2022-08-08 | Stop reason: SDUPTHER

## 2022-07-15 NOTE — TELEPHONE ENCOUNTER
OARRS reviewed. UDS: + for  Robaxin, Cymbalta, Morphine, Gabapentin, Buspirone, Hydrocodone. Last seen: 2/14/2022.  Follow-up:   Future Appointments   Date Time Provider Kaleb Mary Jo   8/8/2022 12:00 PM FADUMO Reyes - CNP N [facility] - [place]

## 2022-08-08 ENCOUNTER — OFFICE VISIT (OUTPATIENT)
Dept: PHYSICAL MEDICINE AND REHAB | Age: 57
End: 2022-08-08
Payer: MEDICARE

## 2022-08-08 VITALS
HEIGHT: 67 IN | BODY MASS INDEX: 22.76 KG/M2 | DIASTOLIC BLOOD PRESSURE: 78 MMHG | WEIGHT: 145 LBS | SYSTOLIC BLOOD PRESSURE: 112 MMHG

## 2022-08-08 DIAGNOSIS — G62.9 PERIPHERAL POLYNEUROPATHY: ICD-10-CM

## 2022-08-08 DIAGNOSIS — F11.90 CHRONIC, CONTINUOUS USE OF OPIOIDS: ICD-10-CM

## 2022-08-08 DIAGNOSIS — G82.54 INCOMPLETE QUADRIPLEGIA AT C5-C8 LEVEL (HCC): ICD-10-CM

## 2022-08-08 DIAGNOSIS — Z96.89 SPINAL CORD STIMULATOR STATUS: ICD-10-CM

## 2022-08-08 DIAGNOSIS — G89.4 PAIN SYNDROME, CHRONIC: ICD-10-CM

## 2022-08-08 DIAGNOSIS — G62.9 NEUROPATHY: ICD-10-CM

## 2022-08-08 DIAGNOSIS — M54.16 LUMBAR RADICULITIS: ICD-10-CM

## 2022-08-08 DIAGNOSIS — G89.4 CHRONIC PAIN SYNDROME: ICD-10-CM

## 2022-08-08 DIAGNOSIS — M48.062 SPINAL STENOSIS, LUMBAR REGION, WITH NEUROGENIC CLAUDICATION: ICD-10-CM

## 2022-08-08 DIAGNOSIS — M47.12 SPONDYLOSIS, CERVICAL, WITH MYELOPATHY: ICD-10-CM

## 2022-08-08 DIAGNOSIS — M54.12 CERVICAL RADICULOPATHY: Primary | ICD-10-CM

## 2022-08-08 PROCEDURE — 99214 OFFICE O/P EST MOD 30 MIN: CPT | Performed by: NURSE PRACTITIONER

## 2022-08-08 RX ORDER — MORPHINE SULFATE 15 MG/1
15 TABLET, FILM COATED, EXTENDED RELEASE ORAL EVERY 12 HOURS
Qty: 60 TABLET | Refills: 0 | Status: SHIPPED | OUTPATIENT
Start: 2022-08-17 | End: 2022-09-16 | Stop reason: SDUPTHER

## 2022-08-08 ASSESSMENT — ENCOUNTER SYMPTOMS
COUGH: 0
CONSTIPATION: 0
VOMITING: 0
DIARRHEA: 0
COLOR CHANGE: 0
ABDOMINAL PAIN: 0
EYES NEGATIVE: 1
SHORTNESS OF BREATH: 0
EYE PAIN: 0
WHEEZING: 0
RHINORRHEA: 0
RESPIRATORY NEGATIVE: 1
CHEST TIGHTNESS: 0
BACK PAIN: 1
SINUS PRESSURE: 0
PHOTOPHOBIA: 0
GASTROINTESTINAL NEGATIVE: 1
NAUSEA: 0
SORE THROAT: 0

## 2022-08-08 NOTE — PROGRESS NOTES
[address] [place] [address]  Dept: [phone]  Dept Fax: [phone]: [phone]    Visit Date: 8/8/2022    Functionality Assessment/Goals Worksheet     On a scale of 0 (Does not Interfere) to 10 (Completely Interferes)     1. Which number describes how during the past week pain has interfered with       the following:  A. General Activity:  9  B. Mood: 9  C. Walking Ability:  9  D. Normal Work (Includes both work outside the home and housework):  9  E. Relations with Other People:   10  F. Sleep:   10  G. Enjoyment of Life:   9    2. Patient Prefers to Take their Pain Medications:     [x]  On a regular basis   [x]  Only when necessary    []  Does not take pain medications    3. What are the Patient's Goals/Expectations for Visiting Pain Management? []  Learn about my pain    [x]  Receive Medication   []  Physical Therapy     []  Treat Depression   [x]  Receive Injections    []  Treat Sleep   []  Deal with Anxiety and Stress   []  Treat Opoid Dependence/Addiction   []  Other:      HPI:   Shane Castillo is a 61 y.o. female is here today for    Chief Complaint: Neck pain, low back pain     HPI   2 month FU. Since she has last seen me had Permanent SCS placed in May and also had SCS IPG revision with Dr. Lucila Braga on July 5th. Using SCS for neck and low back pain. Not using them both always at the same time   Feels that she is getting relief from SCS. States still a lot of pain in posterior neck, Able to do a little more activity. Main complaint is pain throughout legs and feet constant burning and throbbing pain   Still working on adjustment with SCS. Continues to use pain medications- MS ER and [place]. Discussed weaning down.  Statesthat she has been taking less prn Norco   Pain increases with bending, lifting, twisting , walking, standing, sitting, and housework or working at job, rainy weather   Medications reviewed. Patient denies side effects with medications. Patient states she is taking medications as prescribed. Shedenies receiving pain medications from other sources. She denies any ER visits since last visit. Pain scale with out pain medications or at its worst is 7-8/10. Pain scale with pain medications or at its best is 3/10. Last dose of MS ER and [place]  was today   Drug screen reviewed from 6/1/2022 and was appropriate  Pill count completed  today and WNL: Yes      The patientis allergic to ibuprofen. Subjective:      Review of Systems   Constitutional: Negative. Negative for activity change, appetite change, chills, diaphoresis, fatigue, fever and unexpected weight change. HENT: Negative. Negative for congestion, ear pain, hearing loss, mouth sores, nosebleeds, rhinorrhea, sinus pressure and sore throat. Eyes: Negative. Negative for photophobia, pain and visual disturbance. Respiratory: Negative. Negative for cough, chest tightness, shortness of breath and wheezing. Cardiovascular: Negative. Negative for chest pain and palpitations. Gastrointestinal: Negative. Negative for abdominal pain, constipation, diarrhea, nausea and vomiting. Endocrine: Negative for cold intolerance, heat intolerance, polydipsia, polyphagia and polyuria. Genitourinary: Negative. Negative for decreased urine volume, difficulty urinating, frequency and hematuria. Musculoskeletal:  Positive for arthralgias, back pain, gait problem, joint swelling, myalgias, neck pain and neck stiffness. No assist devices today    Skin: Negative. Negative for color change and rash. Allergic/Immunologic: Negative for food allergies and immunocompromised state. Neurological:  Positive for weakness, numbness and headaches. Negative for dizziness, tremors, seizures, syncope, facial asymmetry, speech difficulty and light-headedness.    Hematological:  Does not bruise/bleed easily. Psychiatric/Behavioral:  Positive for sleep disturbance. Negative for agitation, behavioral problems, confusion, decreased concentration, dysphoric mood, hallucinations, self-injury and suicidal ideas. The patient is nervous/anxious. The patient is not hyperactive. Objective:     Vitals:    08/08/22 1136   BP: 112/78   Weight: 145 lb (65.8 kg)   Height: 5' 7\" (1.702 m)       Physical Exam  Vitals and nursing note reviewed. Constitutional:       General: She is not in acute distress. Appearance: She is well-developed. She is not diaphoretic. HENT:      Head: Normocephalic and atraumatic. Right Ear: External ear normal.      Left Ear: External ear normal.      Nose: Nose normal.      Mouth/Throat:      Pharynx: No oropharyngeal exudate. Eyes:      General: No scleral icterus. Right eye: No discharge. Left eye: No discharge. Conjunctiva/sclera: Conjunctivae normal.      Pupils: Pupils are equal, round, and reactive to light. Neck:      Thyroid: No thyromegaly. Cardiovascular:      Rate and Rhythm: Normal rate and regular rhythm. Heart sounds: Normal heart sounds. No murmur heard. No friction rub. No gallop. Pulmonary:      Effort: Pulmonary effort is normal. No respiratory distress. Breath sounds: Normal breath sounds. No wheezing or rales. Chest:      Chest wall: No tenderness. Abdominal:      General: Bowel sounds are normal. There is no distension. Palpations: Abdomen is soft. Tenderness: There is no abdominal tenderness. There is no guarding or rebound. Musculoskeletal:         General: Tenderness present. Right shoulder: Tenderness present. Decreased range of motion. Left shoulder: Tenderness present. Decreased range of motion. Right upper arm: Tenderness present. Left upper arm: Tenderness present. Right elbow: Tenderness present. Left elbow: Tenderness present.       Right forearm: Tenderness present. Left forearm: Tenderness present. Right wrist: Tenderness present. Left wrist: Tenderness present. Right hand: Tenderness present. Decreased strength. Left hand: Tenderness present. Decreased strength. Cervical back: Full passive range of motion without pain, normal range of motion and neck supple. Spasms and tenderness present. No edema, erythema or rigidity. No muscular tenderness. Normal range of motion. Thoracic back: Tenderness present. Lumbar back: Spasms and tenderness present. Back:       Right hip: Tenderness present. Left hip: Tenderness present. Right upper leg: Tenderness present. Left upper leg: Tenderness present. Right knee: Tenderness present. Left knee: Swelling present. Decreased range of motion. Tenderness present. Right lower leg: Swelling and tenderness present. Edema present. Left lower leg: Swelling and tenderness present. Edema present. Right ankle: Swelling present. Tenderness present. Left ankle: Swelling present. Tenderness present. Decreased range of motion. Right foot: Swelling and tenderness present. Left foot: Decreased range of motion. Swelling and tenderness present. Skin:     General: Skin is warm. Coloration: Skin is not pale. Findings: No erythema or rash. Neurological:      Mental Status: She is alert and oriented to person, place, and time. She is not disoriented. Cranial Nerves: No cranial nerve deficit. Sensory: Sensory deficit present. Motor: Weakness present. No atrophy or abnormal muscle tone. Coordination: Coordination normal.      Gait: Gait normal.      Deep Tendon Reflexes: Babinski sign absent on the right side. Reflex Scores:       Tricep reflexes are 1+ on the right side and 1+ on the left side. Bicep reflexes are 1+ on the right side and 1+ on the left side.        Brachioradialis reflexes are 1+ on the right side and 1+ on the left side. Patellar reflexes are 2+ on the right side and 2+ on the left side. Achilles reflexes are 2+ on the right side and 2+ on the left side. Comments: SLR -  Motor 4/5 LUE LLE RUE 4/5 RLE 4/5   Psychiatric:         Attention and Perception: She is attentive. Mood and Affect: Mood is not anxious or depressed. Affect is not labile, blunt, angry or inappropriate. Speech: Speech normal.         Behavior: Behavior normal. Behavior is not agitated, slowed, aggressive, withdrawn, hyperactive or combative. Thought Content: Thought content normal. Thought content is not paranoid or delusional. Thought content does not include homicidal or suicidal ideation. Thought content does not include homicidal or suicidal plan. Cognition and Memory: Memory is not impaired. She does not exhibit impaired recent memory or impaired remote memory. Judgment: Judgment normal. Judgment is not impulsive or inappropriate. DALTON  Patricks test  positive  Yeoman's  or Gaenslen's positive       Assessment:     1. Cervical radiculopathy    2. Spondylosis, cervical, with myelopathy    3. Lumbar radiculitis    4. Spinal stenosis, lumbar region, with neurogenic claudication    5. Peripheral polyneuropathy    6. Incomplete quadriplegia at C5-C8 level ([facility] [address]    7. Neuropathy    8. Spinal cord stimulator status    9. Chronic pain syndrome    10. Chronic, continuous use of opioids    11. Pain syndrome, chronic            Plan:      OARRS reviewed. Current MED: 45.00  Patient was offered naloxone for home. Discussed long term side effects of medications, tolerance, dependency and addiction. Previous UDS reviewed  UDS preformed today for compliance. Patient told can not receive any pain medications from any other source. No evidence of abuse, diversion or aberrant behavior.   Medications and/or procedures to improve function and quality of life- patient understanding with this and that may not be pain free  Discussed with patient about safe storage of medications at home  Discussed possible weaning of medication dosing dependent on treatment/procedure results. Discussed with patient about risks with procedure including infection, reaction to medication, increased pain, or bleeding. Procedure notes reveiwed   Continues to use Cervical SCS and lumbar SCS. Instructed to continue to get them better adjusted for better relief. Working on weaning down pain medications slowly. At this time continue medications: MS Contin 15 mg scheduled BID- ordered refill , Norco 5/325 TID prn - filled 7/10/2022 and still has plenty d/t receiving post op medications. Instructed to try to take 1 MS Contin daily for the next couple weeks with plan to stop MS Contin first.   Continue Neurontin 800 TID- Still has plenty       Meds. Prescribed:   Orders Placed This Encounter   Medications    morphine (MS CONTIN) 15 MG extended release tablet     Sig: Take 1 tablet by mouth in the morning and 1 tablet in the evening. Do all this for 30 days. Dispense:  60 tablet     Refill:  0     Reduce doses taken as pain becomes manageable         Return in about 2 months (around 10/8/2022), or if symptoms worsen or fail to improve, for follow up  for medications.                Electronically signed by FADUMO Ayala CNP on8/8/2022 at 12:12 PM

## 2022-08-17 DIAGNOSIS — M48.062 SPINAL STENOSIS, LUMBAR REGION, WITH NEUROGENIC CLAUDICATION: ICD-10-CM

## 2022-08-17 DIAGNOSIS — M54.12 CERVICAL RADICULOPATHY: ICD-10-CM

## 2022-08-17 DIAGNOSIS — G82.54 INCOMPLETE QUADRIPLEGIA AT C5-C8 LEVEL (HCC): ICD-10-CM

## 2022-08-17 DIAGNOSIS — G89.4 CHRONIC PAIN SYNDROME: ICD-10-CM

## 2022-08-17 DIAGNOSIS — G62.9 NEUROPATHY: ICD-10-CM

## 2022-08-17 DIAGNOSIS — F11.90 CHRONIC, CONTINUOUS USE OF OPIOIDS: ICD-10-CM

## 2022-08-17 DIAGNOSIS — M47.12 SPONDYLOSIS, CERVICAL, WITH MYELOPATHY: ICD-10-CM

## 2022-08-17 DIAGNOSIS — M54.16 LUMBAR RADICULITIS: ICD-10-CM

## 2022-08-17 DIAGNOSIS — G62.9 PERIPHERAL POLYNEUROPATHY: ICD-10-CM

## 2022-08-17 NOTE — TELEPHONE ENCOUNTER
Federico Luis called requesting a refill on the following medications:  Requested Prescriptions     Pending Prescriptions Disp Refills    gabapentin (NEURONTIN) 800 MG tablet 270 tablet 0     Sig: Take 1 tablet by mouth 3 times daily for 90 days. HYDROcodone-acetaminophen (NORCO) 5-325 MG per tablet 90 tablet 0     Sig: Take 1 tablet by mouth every 8 hours as needed for Pain for up to 30 days. Take lowest dose possible to manage pain     Pharmacy verified: [facility] in [place], [place]  . pv      Date of last visit: 8/08/2022  Date of next visit (if applicable): 65/43/9136

## 2022-08-22 RX ORDER — GABAPENTIN 800 MG/1
800 TABLET ORAL 3 TIMES DAILY
Qty: 270 TABLET | Refills: 0 | Status: SHIPPED | OUTPATIENT
Start: 2022-08-22 | End: 2022-11-20

## 2022-08-22 RX ORDER — HYDROCODONE BITARTRATE AND ACETAMINOPHEN 5; 325 MG/1; MG/1
1 TABLET ORAL EVERY 8 HOURS PRN
Qty: 90 TABLET | Refills: 0 | Status: SHIPPED | OUTPATIENT
Start: 2022-08-22 | End: 2022-09-21

## 2022-08-22 NOTE — TELEPHONE ENCOUNTER
OARRS reviewed. UDS: + for Robaxin, Cymbalta. Gabapentin, Hydrocodone, Morphine, Wellbutrin. Last seen: 8/8/2022.  Follow-up:   Future Appointments   Date Time Provider Kaleb Camargo   10/12/2022 11:30 AM FADUMO Haq - CNP N SRPX Pain [facility] - [address] [facility]

## 2022-09-14 DIAGNOSIS — G89.4 PAIN SYNDROME, CHRONIC: ICD-10-CM

## 2022-09-14 NOTE — TELEPHONE ENCOUNTER
Jaja Pema called requesting a refill on the following medications:  Requested Prescriptions     Pending Prescriptions Disp Refills    morphine (MS CONTIN) 15 MG extended release tablet 60 tablet 0     Sig: Take 1 tablet by mouth in the morning and 1 tablet in the evening. Do all this for 30 days. Pharmacy verified:[facility] [id number]      Date of last visit:8-8-22   Date of next visit (if applicable): 32/69/5744

## 2022-09-16 RX ORDER — MORPHINE SULFATE 15 MG/1
15 TABLET, FILM COATED, EXTENDED RELEASE ORAL EVERY 12 HOURS
Qty: 60 TABLET | Refills: 0 | Status: SHIPPED | OUTPATIENT
Start: 2022-09-16 | End: 2022-10-12 | Stop reason: SDUPTHER

## 2022-09-16 NOTE — TELEPHONE ENCOUNTER
OARRS reviewed. UDS: + for  Methocarbamol, Duloxetine, Gabapentin, Hydrocodone, Morphine, Buspirone -consistent. Negative for  Oxycodone. Last seen: 8/8/2022.  Follow-up:   Future Appointments   Date Time Provider Kaleb Camargo   10/6/2022 11:30 AM FADUMO Fernández - CNP N SRPX Pain [facility] - [address]

## 2022-10-12 DIAGNOSIS — G89.4 PAIN SYNDROME, CHRONIC: ICD-10-CM

## 2022-10-12 RX ORDER — MORPHINE SULFATE 15 MG/1
15 TABLET, FILM COATED, EXTENDED RELEASE ORAL EVERY 12 HOURS
Qty: 60 TABLET | Refills: 0 | Status: SHIPPED | OUTPATIENT
Start: 2022-10-16 | End: 2022-11-10 | Stop reason: SDUPTHER

## 2022-10-12 NOTE — TELEPHONE ENCOUNTER
OARRS reviewed. UDS: + for  Methocarbamol, Duloxetine, Gabapentin , Hydrocodone, Morphine, Buspirone  -consistent. + for  Oxycodone  -inconsistent  Last seen: 8/8/2022.  Follow-up:   Future Appointments   Date Time Provider Kaleb Camargo   10/19/2022 12:00 PM FADUMO Gao - CNP N SRPX Pain [facility] - [address]

## 2022-10-12 NOTE — TELEPHONE ENCOUNTER
Henry Og called requesting a refill on the following medications:  Requested Prescriptions     Pending Prescriptions Disp Refills    morphine (MS CONTIN) 15 MG extended release tablet 60 tablet 0     Sig: Take 1 tablet by mouth in the morning and 1 tablet in the evening. Do all this for 30 days. Pharmacy verified: [facility] in [facility]  . pv      Date of last visit: 8/08/2022  Date of next visit (if applicable): 30/36/9680

## 2022-11-10 ENCOUNTER — OFFICE VISIT (OUTPATIENT)
Dept: PHYSICAL MEDICINE AND REHAB | Age: 57
End: 2022-11-10
Payer: MEDICARE

## 2022-11-10 VITALS
WEIGHT: 145 LBS | HEIGHT: 67 IN | DIASTOLIC BLOOD PRESSURE: 66 MMHG | SYSTOLIC BLOOD PRESSURE: 122 MMHG | BODY MASS INDEX: 22.76 KG/M2

## 2022-11-10 DIAGNOSIS — M48.062 SPINAL STENOSIS, LUMBAR REGION, WITH NEUROGENIC CLAUDICATION: ICD-10-CM

## 2022-11-10 DIAGNOSIS — M47.12 SPONDYLOSIS, CERVICAL, WITH MYELOPATHY: ICD-10-CM

## 2022-11-10 DIAGNOSIS — G62.9 PERIPHERAL POLYNEUROPATHY: ICD-10-CM

## 2022-11-10 DIAGNOSIS — M54.12 CERVICAL RADICULOPATHY: Primary | ICD-10-CM

## 2022-11-10 DIAGNOSIS — G89.4 PAIN SYNDROME, CHRONIC: ICD-10-CM

## 2022-11-10 DIAGNOSIS — M54.16 LUMBAR RADICULITIS: ICD-10-CM

## 2022-11-10 DIAGNOSIS — G82.54 INCOMPLETE QUADRIPLEGIA AT C5-C8 LEVEL (HCC): ICD-10-CM

## 2022-11-10 DIAGNOSIS — G62.9 NEUROPATHY: ICD-10-CM

## 2022-11-10 DIAGNOSIS — G89.4 CHRONIC PAIN SYNDROME: ICD-10-CM

## 2022-11-10 DIAGNOSIS — F11.90 CHRONIC, CONTINUOUS USE OF OPIOIDS: ICD-10-CM

## 2022-11-10 PROCEDURE — 99214 OFFICE O/P EST MOD 30 MIN: CPT | Performed by: NURSE PRACTITIONER

## 2022-11-10 RX ORDER — MORPHINE SULFATE 15 MG/1
15 TABLET, FILM COATED, EXTENDED RELEASE ORAL EVERY 12 HOURS
Qty: 60 TABLET | Refills: 0 | Status: SHIPPED | OUTPATIENT
Start: 2022-11-15 | End: 2022-12-15

## 2022-11-10 ASSESSMENT — ENCOUNTER SYMPTOMS
RESPIRATORY NEGATIVE: 1
GASTROINTESTINAL NEGATIVE: 1
BACK PAIN: 1

## 2022-11-10 NOTE — PROGRESS NOTES
[address] [facility]  [address]  Dept: [phone]  Dept Fax: [phone]: [phone]    Visit Date: 11/10/2022    Functionality Assessment/Goals Worksheet     On a scale of 0 (Does not Interfere) to 10 (Completely Interferes)     1. Which number describes how during the past week pain has interfered with       the following:  A. General Activity:  7  B. Mood: 8  C. Walking Ability:  8  D. Normal Work (Includes both work outside the home and housework):  9  E. Relations with Other People:   7  F. Sleep:   7  G. Enjoyment of Life:   8    2. Patient Prefers to Take their Pain Medications:     [x]  On a regular basis   [x]  Only when necessary    []  Does not take pain medications    3. What are the Patient's Goals/Expectations for Visiting Pain Management? [x]  Learn about my pain    [x]  Receive Medication   [x]  Physical Therapy     []  Treat Depression   [x]  Receive Injections    []  Treat Sleep   []  Deal with Anxiety and Stress   []  Treat Opoid Dependence/Addiction   []  Other:        HPI:   Noris Mistry is a 61 y.o. female is here today for    Chief Complaint: Neck pain, low back pain     HPI   3  month FU. Patient reports that overall pain has been better controlled. Main complaints ins pain throughout legs and feet- tingling, stinging and burning. Has tingling in left hand. Continues to have posterior neck pain aching. Overall feels that Cervical SCS is helping a lot. Has weaned down on pain medications and completely off [address] on MS ER 15 mg scheduled BID and has had difficulty weaning off this with having spikes of pain and also some withdrawal symptoms. Pain increases with bending, lifting, twisting , turning torso, walking, getting up and down, and housework or working at job. Medications reviewed.  Patient denies side effects with medications. Patient states she is taking medications as prescribed. Shedenies receiving pain medications from other sources. She denies any ER visits since last visit. Pain scale with out pain medications or at its worst is 4-5/10 with SCS and MS ER     Last dose of MS ER  was today, Neurontin was today   Drug screen reviewed from 8/8/2022 and was  appropriate   Pill count completed  today and WNL: Yes      The patientis allergic to ibuprofen. Subjective:      Review of Systems   Constitutional:  Positive for unexpected weight change. Respiratory: Negative. Cardiovascular: Negative. Gastrointestinal: Negative. Genitourinary: Negative. Musculoskeletal:  Positive for arthralgias, back pain, gait problem, myalgias, neck pain and neck stiffness. Not using any assist devices    Neurological:  Positive for weakness, numbness and headaches. Negative for dizziness. Psychiatric/Behavioral:  Positive for sleep disturbance. Objective:     Vitals:    11/10/22 1046   BP: 122/66   Site: Right Upper Arm   Position: Sitting   Cuff Size: Medium Adult   Weight: 145 lb (65.8 kg)   Height: 5' 7.01\" (1.702 m)       Physical Exam  Vitals and nursing note reviewed. Constitutional:       General: She is not in acute distress. Appearance: Normal appearance. She is well-developed. She is not diaphoretic. Comments: thin   HENT:      Head: Normocephalic and atraumatic. Right Ear: External ear normal.      Left Ear: External ear normal.      Nose: Nose normal.      Mouth/Throat:      Pharynx: No oropharyngeal exudate. Eyes:      General: No scleral icterus. Right eye: No discharge. Left eye: No discharge. Conjunctiva/sclera: Conjunctivae normal.      Pupils: Pupils are equal, round, and reactive to light. Neck:      Thyroid: No thyromegaly. Cardiovascular:      Rate and Rhythm: Normal rate and regular rhythm. Heart sounds: Normal heart sounds.  No murmur heard.    No friction rub. No gallop. Pulmonary:      Effort: Pulmonary effort is normal. No respiratory distress. Breath sounds: Normal breath sounds. No wheezing or rales. Chest:      Chest wall: No tenderness. Abdominal:      General: Bowel sounds are normal. There is no distension. Palpations: Abdomen is soft. Tenderness: There is no abdominal tenderness. There is no guarding or rebound. Musculoskeletal:         General: Tenderness present. Right shoulder: Tenderness present. Decreased range of motion. Left shoulder: Tenderness present. Decreased range of motion. Right upper arm: Tenderness present. Left upper arm: Tenderness present. Right elbow: Tenderness present. Left elbow: Tenderness present. Right forearm: Tenderness present. Left forearm: Tenderness present. Right wrist: Tenderness present. Left wrist: Tenderness present. Right hand: Tenderness present. Decreased strength. Left hand: Tenderness present. Decreased strength. Cervical back: Full passive range of motion without pain and normal range of motion. Rigidity, spasms and tenderness present. No edema or erythema. No muscular tenderness. Normal range of motion. Thoracic back: Tenderness present. Lumbar back: Spasms and tenderness present. Back:       Right hip: Tenderness present. Left hip: Tenderness present. Right upper leg: Tenderness present. Left upper leg: Tenderness present. Right knee: Tenderness present. Left knee: Swelling present. Decreased range of motion. Tenderness present. Right lower leg: Swelling and tenderness present. Edema present. Left lower leg: Swelling and tenderness present. Edema present. Right ankle: Swelling present. Tenderness present. Left ankle: Swelling present. Tenderness present. Decreased range of motion. Right foot: Swelling and tenderness present.       Left foot: Decreased range of motion. Swelling and tenderness present. Skin:     General: Skin is warm. Coloration: Skin is not pale. Findings: No erythema or rash. Neurological:      Mental Status: She is alert and oriented to person, place, and time. She is not disoriented. Cranial Nerves: No cranial nerve deficit. Sensory: Sensory deficit present. Motor: Weakness present. No atrophy or abnormal muscle tone. Coordination: Coordination normal.      Gait: Gait abnormal.      Deep Tendon Reflexes: Babinski sign absent on the right side. Reflex Scores:       Tricep reflexes are 1+ on the right side and 1+ on the left side. Bicep reflexes are 1+ on the right side and 1+ on the left side. Brachioradialis reflexes are 1+ on the right side and 1+ on the left side. Patellar reflexes are 2+ on the right side and 2+ on the left side. Achilles reflexes are 2+ on the right side and 2+ on the left side. Comments: SLR -  Motor 4/5 LUE LLE RUE 4/5 RLE 4/5   Psychiatric:         Attention and Perception: She is attentive. Mood and Affect: Mood normal. Mood is not anxious or depressed. Affect is not labile, blunt, angry or inappropriate. Speech: Speech normal.         Behavior: Behavior normal. Behavior is not agitated, slowed, aggressive, withdrawn, hyperactive or combative. Thought Content: Thought content normal. Thought content is not paranoid or delusional. Thought content does not include homicidal or suicidal ideation. Thought content does not include homicidal or suicidal plan. Cognition and Memory: Memory is not impaired. She does not exhibit impaired recent memory or impaired remote memory. Judgment: Judgment normal. Judgment is not impulsive or inappropriate. DALTON  Patricks test  positive  Yeoman's  or Gaenslen's positive         Assessment:     1. Cervical radiculopathy    2.  Spondylosis, cervical, with myelopathy    3. Lumbar radiculitis    4. Spinal stenosis, lumbar region, with neurogenic claudication    5. Peripheral polyneuropathy    6. Incomplete quadriplegia at C5-C8 level ([address]    7. Neuropathy    8. Pain syndrome, chronic    9. Chronic pain syndrome    10. Chronic, continuous use of opioids            Plan:      OARRS reviewed. Current MED: 30.00  Patient was offered naloxone for home. Discussed long term side effects of medications, tolerance, dependency and addiction. Previous UDS reviewed  UDS preformed today for compliance. Patient told can not receive any pain medications from any other source. No evidence of abuse, diversion or aberrant behavior. Medications and/or procedures to improve function and quality of life- patient understanding with this and that may not be pain free  Discussed with patient about safe storage of medications at home  Discussed possible weaning of medication dosing dependent on treatment/procedure results. Discussed with patient about risks with procedure including infection, reaction to medication, increased pain, or bleeding. Procedure notes reveiwed   Continues to use Cervical SCS and lumbar SCS. With better relief of pain. Working on weaning medications completely off Norco prn. At this time continue MS ER 15 mg scheduled BID for the next 1-2 months- ordered refill. Discussed weaning. Option in future would be to change MS ER to MS IR 15 mg tabs 1/2 tab QID prn to help with weaning but will revisit this at next visit   Continue Neurontin 800 TID- Still has plenty   Is compliant. Will FU in another 2 months     Meds. Prescribed:   Orders Placed This Encounter   Medications    morphine (MS CONTIN) 15 MG extended release tablet     Sig: Take 1 tablet by mouth in the morning and 1 tablet in the evening. Do all this for 30 days.      Dispense:  60 tablet     Refill:  0     Reduce doses taken as pain becomes manageable         Return in about 2 months (around 1/10/2023), or if symptoms worsen or fail to improve, for follow up  for medications.                Electronically signed by FADUMO Ko CNP on11/10/2022 at 11:23 AM

## 2022-11-30 RX ORDER — GABAPENTIN 800 MG/1
800 TABLET ORAL 3 TIMES DAILY
Qty: 270 TABLET | Refills: 0 | Status: SHIPPED | OUTPATIENT
Start: 2022-11-30 | End: 2023-02-28

## 2022-11-30 NOTE — TELEPHONE ENCOUNTER
Horace Lux called requesting a refill on the following medications:  Requested Prescriptions     Pending Prescriptions Disp Refills    gabapentin (NEURONTIN) 800 MG tablet 270 tablet 0     Sig: Take 1 tablet by mouth 3 times daily for 90 days. Pharmacy verified: [facility] in [place], [place]  . pv      Date of last visit: 11/10/2022  Date of next visit (if applicable): 6/13/0644

## 2022-11-30 NOTE — TELEPHONE ENCOUNTER
OARRS reviewed. UDS: + for  Methocarbamol, Duloxetine, Buspirone, Gabapentin, Morphine -consistent. Last seen: 11/10/2022.  Follow-up:   Future Appointments   Date Time Provider Kaleb Camargo   1/11/2023 11:45 AM FADUMO Bass - CNP N SRPX Pain [facility] - [address]

## 2022-12-13 DIAGNOSIS — G89.4 PAIN SYNDROME, CHRONIC: ICD-10-CM

## 2022-12-13 RX ORDER — MORPHINE SULFATE 15 MG/1
15 TABLET, FILM COATED, EXTENDED RELEASE ORAL EVERY 12 HOURS
Qty: 60 TABLET | Refills: 0 | Status: SHIPPED | OUTPATIENT
Start: 2022-12-15 | End: 2023-01-14

## 2022-12-13 NOTE — TELEPHONE ENCOUNTER
Cam Smith called requesting a refill on the following medications:  Requested Prescriptions     Pending Prescriptions Disp Refills    morphine (MS CONTIN) 15 MG extended release tablet 60 tablet 0     Sig: Take 1 tablet by mouth in the morning and 1 tablet in the evening. Do all this for 30 days.      Pharmacy verified:  [facility]      Date of last visit: 11-10-22  Date of next visit (if applicable): 0/27/0824

## 2022-12-13 NOTE — TELEPHONE ENCOUNTER
OARRS reviewed. UDS: + Methocarbamol, Duloxetine, Buspirone, Gabapentin, Morphine, Present--Hydrocodone, Oxycodone  Last seen: 11/10/2022.  Follow-up:   Future Appointments   Date Time Provider Kaleb Mary Jo   1/11/2023 11:45 AM FADUMO Toussaint - CNP [facility] - [place]

## 2023-01-11 ENCOUNTER — OFFICE VISIT (OUTPATIENT)
Dept: PHYSICAL MEDICINE AND REHAB | Age: 58
End: 2023-01-11
Payer: MEDICARE

## 2023-01-11 VITALS
DIASTOLIC BLOOD PRESSURE: 64 MMHG | SYSTOLIC BLOOD PRESSURE: 120 MMHG | WEIGHT: 145 LBS | HEIGHT: 67 IN | BODY MASS INDEX: 22.76 KG/M2

## 2023-01-11 DIAGNOSIS — G82.54 INCOMPLETE QUADRIPLEGIA AT C5-C8 LEVEL (HCC): ICD-10-CM

## 2023-01-11 DIAGNOSIS — G62.9 PERIPHERAL POLYNEUROPATHY: ICD-10-CM

## 2023-01-11 DIAGNOSIS — M48.062 SPINAL STENOSIS, LUMBAR REGION, WITH NEUROGENIC CLAUDICATION: ICD-10-CM

## 2023-01-11 DIAGNOSIS — M54.12 CERVICAL RADICULOPATHY: Primary | ICD-10-CM

## 2023-01-11 DIAGNOSIS — G89.4 CHRONIC PAIN SYNDROME: ICD-10-CM

## 2023-01-11 DIAGNOSIS — G89.4 PAIN SYNDROME, CHRONIC: ICD-10-CM

## 2023-01-11 DIAGNOSIS — Z96.89 SPINAL CORD STIMULATOR STATUS: ICD-10-CM

## 2023-01-11 DIAGNOSIS — M47.12 SPONDYLOSIS, CERVICAL, WITH MYELOPATHY: ICD-10-CM

## 2023-01-11 DIAGNOSIS — M54.16 LUMBAR RADICULITIS: ICD-10-CM

## 2023-01-11 DIAGNOSIS — G62.9 NEUROPATHY: ICD-10-CM

## 2023-01-11 PROCEDURE — 99214 OFFICE O/P EST MOD 30 MIN: CPT | Performed by: NURSE PRACTITIONER

## 2023-01-11 RX ORDER — MORPHINE SULFATE 15 MG/1
15 TABLET, FILM COATED, EXTENDED RELEASE ORAL EVERY 12 HOURS
Qty: 60 TABLET | Refills: 0 | Status: SHIPPED | OUTPATIENT
Start: 2023-01-14 | End: 2023-02-13

## 2023-01-11 ASSESSMENT — ENCOUNTER SYMPTOMS
GASTROINTESTINAL NEGATIVE: 1
BACK PAIN: 1
RESPIRATORY NEGATIVE: 1

## 2023-01-11 NOTE — PROGRESS NOTES
[facility] [address]  Dept: [phone]  Dept Fax: [phone]: [phone]    Visit Date: 1/11/2023    Functionality Assessment/Goals Worksheet     On a scale of 0 (Does not Interfere) to 10 (Completely Interferes)     1. Which number describes how during the past week pain has interfered with       the following:  A. General Activity:  7  B. Mood: 7  C. Walking Ability:  7  D. Normal Work (Includes both work outside the home and housework):  7  E. Relations with Other People:   7  F. Sleep:   7  G. Enjoyment of Life:   7      2. Patient Prefers to Take their Pain Medications:     []  On a regular basis   [x]  Only when necessary    []  Does not take pain medications    3. What are the Patient's Goals/Expectations for Visiting Pain Management? []  Learn about my pain    [x]  Receive Medication   []  Physical Therapy     []  Treat Depression   [x]  Receive Injections    []  Treat Sleep   []  Deal with Anxiety and Stress   []  Treat Opoid Dependence/Addiction   []  Other:      HPI:   Jude Lentz is a 61 y.o. female is here today for    Chief Complaint: Neck pain, low back pain    HPI   2 month FU. Patient had large right foot surgery last Friday. I did discuss case with podiatrist before surgery about post op pain medications. Was given some prn Percocet but she reports she only took 2 tabs. Doing well and not having much pain. Is non weightbearing at this time and right lower extremity foot and ankle wrapped in ace dressing. Continues to have neck pain, low back pain, and radicular pain but SCS and pain medications remains effective. Still gets spikes up and down   Using a scooter to get around at this time. At this time continues MS ER BID.  Again discussed continued weaning in future   Pain increases with bending, lifting, twisting , and getting up and down.    Medications reviewed. Patient denies side effects with medications. Patient states she is taking medications as prescribed. Shedenies receiving pain medications from other sources. She denies any ER visits since last visit.    Pain scale with out pain medications or at its worst is 6-7/10.  Pain scale with pain medications or at its best is 4/10.  Last dose of MS ER was today , post op Percocet was 2 days ago   Drug screen reviewed from 11/10/2022 and was appropriate  Pill count completed  today and WNL: Yes      The patientis allergic to ibuprofen.      Subjective:      Review of Systems   Constitutional:  Positive for activity change. Negative for unexpected weight change.   Respiratory: Negative.     Cardiovascular: Negative.    Gastrointestinal: Negative.    Genitourinary: Negative.    Musculoskeletal:  Positive for arthralgias, back pain, gait problem, myalgias, neck pain and neck stiffness.        Using a scooter non weight bearing right lower extremity    Skin:         Right foot and ankle wrapped with ace dressing   Neurological:  Positive for weakness, numbness and headaches. Negative for dizziness.   Psychiatric/Behavioral:  Positive for sleep disturbance.      Objective:     Vitals:    01/11/23 1127   BP: 120/64   Weight: 145 lb (65.8 kg)   Height: 5' 7.01\" (1.702 m)       Physical Exam  Vitals and nursing note reviewed.   Constitutional:       General: She is not in acute distress.     Appearance: Normal appearance. She is well-developed. She is not diaphoretic.      Comments: thin   HENT:      Head: Normocephalic and atraumatic.      Right Ear: External ear normal.      Left Ear: External ear normal.      Nose: Nose normal.      Mouth/Throat:      Pharynx: No oropharyngeal exudate.   Eyes:      General: No scleral icterus.        Right eye: No discharge.         Left eye: No discharge.      Conjunctiva/sclera: Conjunctivae normal.      Pupils: Pupils are equal, round, and reactive to light.  Neck:      Thyroid: No thyromegaly. Cardiovascular:      Rate and Rhythm: Normal rate and regular rhythm. Heart sounds: Normal heart sounds. No murmur heard. No friction rub. No gallop. Pulmonary:      Effort: Pulmonary effort is normal. No respiratory distress. Breath sounds: Normal breath sounds. No wheezing or rales. Chest:      Chest wall: No tenderness. Abdominal:      General: Bowel sounds are normal. There is no distension. Palpations: Abdomen is soft. Tenderness: There is no abdominal tenderness. There is no guarding or rebound. Musculoskeletal:         General: Tenderness present. Right shoulder: Tenderness present. Decreased range of motion. Left shoulder: Tenderness present. Decreased range of motion. Right upper arm: Tenderness present. Left upper arm: Tenderness present. Right elbow: Tenderness present. Left elbow: Tenderness present. Right forearm: Tenderness present. Left forearm: Tenderness present. Right wrist: Tenderness present. Left wrist: Tenderness present. Right hand: Tenderness present. Decreased strength. Left hand: Tenderness present. Decreased strength. Cervical back: Full passive range of motion without pain and normal range of motion. Rigidity, spasms and tenderness present. No edema or erythema. No muscular tenderness. Normal range of motion. Thoracic back: Tenderness present. Lumbar back: Spasms and tenderness present. Back:       Right hip: Tenderness present. Left hip: Tenderness present. Right upper leg: Tenderness present. Left upper leg: Tenderness present. Right knee: Tenderness present. Left knee: Swelling present. Decreased range of motion. Tenderness present. Right lower leg: Swelling and tenderness present. Edema present. Left lower leg: Swelling and tenderness present. Edema present. Right ankle: Swelling present. Tenderness present. Left ankle: Swelling present. Tenderness present. Decreased range of motion. Right foot: Swelling and tenderness present. Left foot: Decreased range of motion. Swelling and tenderness present. Skin:     General: Skin is warm. Coloration: Skin is not pale. Findings: No erythema or rash. Neurological:      Mental Status: She is alert and oriented to person, place, and time. She is not disoriented. Cranial Nerves: No cranial nerve deficit. Sensory: Sensory deficit present. Motor: Weakness present. No atrophy or abnormal muscle tone. Coordination: Coordination normal.      Gait: Gait abnormal.      Deep Tendon Reflexes: Babinski sign absent on the right side. Reflex Scores:       Tricep reflexes are 1+ on the right side and 1+ on the left side. Bicep reflexes are 1+ on the right side and 1+ on the left side. Brachioradialis reflexes are 1+ on the right side and 1+ on the left side. Patellar reflexes are 2+ on the right side and 2+ on the left side. Achilles reflexes are 2+ on the right side and 2+ on the left side. Comments: SLR -  Motor 4/5 LUE LLE RUE 4/5 RLE 4/5   Psychiatric:         Attention and Perception: She is attentive. Mood and Affect: Mood normal. Mood is not anxious or depressed. Affect is not labile, blunt, angry or inappropriate. Speech: Speech normal.         Behavior: Behavior normal. Behavior is not agitated, slowed, aggressive, withdrawn, hyperactive or combative. Thought Content: Thought content normal. Thought content is not paranoid or delusional. Thought content does not include homicidal or suicidal ideation. Thought content does not include homicidal or suicidal plan. Cognition and Memory: Memory is not impaired. She does not exhibit impaired recent memory or impaired remote memory.          Judgment: Judgment normal. Judgment is not impulsive or inappropriate. DALTON  Patricks test  positive  Yeoman's  or Gaenslen's positive         Assessment:     1. Cervical radiculopathy    2. Spondylosis, cervical, with myelopathy    3. Lumbar radiculitis    4. Spinal stenosis, lumbar region, with neurogenic claudication    5. Peripheral polyneuropathy    6. Neuropathy    7. Incomplete quadriplegia at C5-C8 level ([address]    8. Pain syndrome, chronic    9. Chronic pain syndrome    10. Spinal cord stimulator status            Plan:      OARRS reviewed. Current MED: 30.00  Patient was offered naloxone for home. Discussed long term side effects of medications, tolerance, dependency and addiction. Previous UDS reviewed  UDS preformed today for compliance. Patient told can not receive any pain medications from any other source. No evidence of abuse, diversion or aberrant behavior. Medications and/or procedures to improve function and quality of life- patient understanding with this and that may not be pain free  Discussed with patient about safe storage of medications at home  Discussed possible weaning of medication dosing dependent on treatment/procedure results. Discussed with patient about risks with procedure including infection, reaction to medication, increased pain, or bleeding. Continues to use Cervical SCS and lumbar SCS which remains effective  Healing from right foot surgery and on Percocet prn from surgeon but not needing much. At this time Continue MS ER 15 mg scheduled BID- ordered refill. Will work to wean down further. Continue Neurontin 800 TID- Still has plenty   Is compliant. Meds. Prescribed:   Orders Placed This Encounter   Medications    morphine (MS CONTIN) 15 MG extended release tablet     Sig: Take 1 tablet by mouth in the morning and 1 tablet in the evening. Do all this for 30 days.      Dispense:  60 tablet     Refill:  0     Reduce doses taken as pain becomes manageable    Handicap Placard MISC     Sig: by Does not apply route Expires 1/11/2028     Dispense:  1 each     Refill:  1     Expires 1/11/2028         Return in about 2 months (around 3/11/2023), or if symptoms worsen or fail to improve, for follow up  for medications.                Electronically signed by FADUMO Garrett CNP on1/11/2023 at 12:19 PM

## 2023-02-16 DIAGNOSIS — G89.4 PAIN SYNDROME, CHRONIC: ICD-10-CM

## 2023-02-16 RX ORDER — MORPHINE SULFATE 15 MG/1
15 TABLET, FILM COATED, EXTENDED RELEASE ORAL EVERY 12 HOURS
Qty: 60 TABLET | Refills: 0 | Status: SHIPPED | OUTPATIENT
Start: 2023-02-16 | End: 2023-03-18

## 2023-02-16 NOTE — TELEPHONE ENCOUNTER
OARRS reviewed. UDS: + for  methocarbamol, duloxetine, morphine, gabapentin, buspirone,. Negative hydrocodone, oxycodone. Last seen: 1/11/2023.  Follow-up:   Future Appointments   Date Time Provider Kaleb Camargo   3/16/2023 10:40 AM STR [facility]  [facility]   3/16/2023 11:30 AM FADUMO Haq - CNP [facility] - [place]

## 2023-02-16 NOTE — TELEPHONE ENCOUNTER
Chanell Alfonso called requesting a refill on the following medications:      PT COMPLETLEY OUT FORGOT TO CALL EARLIER WANTS A FAST REPLY  Requested Prescriptions     Pending Prescriptions Disp Refills    morphine (MS CONTIN) 15 MG extended release tablet 60 tablet 0     Sig: Take 1 tablet by mouth in the morning and 1 tablet in the evening. Do all this for 30 days.      Pharmacy verified:  [facility] [place]      Date of last visit: 01-11-23  Date of next visit (if applicable): 4/17/6115

## 2023-03-08 ENCOUNTER — HOSPITAL ENCOUNTER (OUTPATIENT)
Dept: WOMENS IMAGING | Age: 58
Discharge: HOME | End: 2023-03-08
Payer: MEDICARE

## 2023-03-08 ENCOUNTER — OFFICE VISIT (OUTPATIENT)
Dept: PHYSICAL MEDICINE AND REHAB | Age: 58
End: 2023-03-08
Payer: MEDICARE

## 2023-03-08 VITALS
DIASTOLIC BLOOD PRESSURE: 62 MMHG | SYSTOLIC BLOOD PRESSURE: 110 MMHG | BODY MASS INDEX: 22.76 KG/M2 | WEIGHT: 145 LBS | HEIGHT: 67 IN

## 2023-03-08 DIAGNOSIS — F11.90 CHRONIC, CONTINUOUS USE OF OPIOIDS: ICD-10-CM

## 2023-03-08 DIAGNOSIS — M54.12 CERVICAL RADICULOPATHY: Primary | ICD-10-CM

## 2023-03-08 DIAGNOSIS — G82.54 INCOMPLETE QUADRIPLEGIA AT C5-C8 LEVEL (HCC): ICD-10-CM

## 2023-03-08 DIAGNOSIS — M47.12 SPONDYLOSIS, CERVICAL, WITH MYELOPATHY: ICD-10-CM

## 2023-03-08 DIAGNOSIS — G62.9 NEUROPATHY: ICD-10-CM

## 2023-03-08 DIAGNOSIS — G89.4 CHRONIC PAIN SYNDROME: ICD-10-CM

## 2023-03-08 DIAGNOSIS — G62.9 PERIPHERAL POLYNEUROPATHY: ICD-10-CM

## 2023-03-08 DIAGNOSIS — Z12.31 VISIT FOR SCREENING MAMMOGRAM: ICD-10-CM

## 2023-03-08 DIAGNOSIS — M48.062 SPINAL STENOSIS, LUMBAR REGION, WITH NEUROGENIC CLAUDICATION: ICD-10-CM

## 2023-03-08 DIAGNOSIS — M54.16 LUMBAR RADICULITIS: ICD-10-CM

## 2023-03-08 DIAGNOSIS — Z96.89 SPINAL CORD STIMULATOR STATUS: ICD-10-CM

## 2023-03-08 PROCEDURE — 99214 OFFICE O/P EST MOD 30 MIN: CPT | Performed by: NURSE PRACTITIONER

## 2023-03-08 PROCEDURE — 77063 BREAST TOMOSYNTHESIS BI: CPT

## 2023-03-08 RX ORDER — GABAPENTIN 800 MG/1
800 TABLET ORAL 3 TIMES DAILY
Qty: 270 TABLET | Refills: 0 | Status: SHIPPED | OUTPATIENT
Start: 2023-03-08 | End: 2023-06-06

## 2023-03-08 ASSESSMENT — ENCOUNTER SYMPTOMS
BACK PAIN: 1
GASTROINTESTINAL NEGATIVE: 1
ROS SKIN COMMENTS: RIGHT FOOT IN BOOT
COUGH: 1

## 2023-03-08 NOTE — PROGRESS NOTES
[facility] [address]  Dept: [phone]  Dept Fax: [phone]: [phone]    Visit Date: 3/8/2023    Functionality Assessment/Goals Worksheet     On a scale of 0 (Does not Interfere) to 10 (Completely Interferes)     1. Which number describes how during the past week pain has interfered with       the following:  A. General Activity:  5  B. Mood: 5  C. Walking Ability:  8  D. Normal Work (Includes both work outside the home and housework):  5  E. Relations with Other People:   5  F. Sleep:   2  G. Enjoyment of Life:   5    2. Patient Prefers to Take their Pain Medications:     [x]  On a regular basis   [x]  Only when necessary    []  Does not take pain medications    3. What are the Patient's Goals/Expectations for Visiting Pain Management? [x]  Learn about my pain    []  Receive Medication   [x]  Physical Therapy     []  Treat Depression   [x]  Receive Injections    []  Treat Sleep   []  Deal with Anxiety and Stress   []  Treat Opoid Dependence/Addiction   []  Other:      HPI:   Leonor Arevalo is a 61 y.o. female is here today for    Chief Complaint: Neck pain, low back pain     HPI   \"All of my pain is tolerable I am down to just 1/2 tab of morphine per day\"  Planning on Sunday trying to cut it out completely. Not too much pain. Did  have some stomach aches and diarrhea with decreasing medications but has improved. SCS remains effective for neck, pain, low back pain, and radicular pain. Burning pain pain in legs and aching pain   Continues to wear right foot boot and healing continues from right foot surgery. Now waling and not using scooter. Quality of life has greatly improved. Pain increases with doing too much without rest   Is sleeping better   Has cold and taking cold medications last 2 days   Medications reviewed.  Patient denies side effects with medications. Patient states she is taking medications as prescribed. Shedenies receiving pain medications from other sources. She denies any ER visits since last visit.    Pain scale with out pain medications or at its worst is 2-3/10.    Last dose of MS ER 1/2 tab today    Drug screen reviewed from 1/11/2023 and was appropriate  Pill count completed  today and WNL: Yes      The patientis allergic to ibuprofen.      Subjective:      Review of Systems   Constitutional:  Negative for activity change and unexpected weight change.   HENT:  Positive for congestion.    Respiratory:  Positive for cough.    Cardiovascular: Negative.    Gastrointestinal: Negative.    Genitourinary: Negative.    Musculoskeletal:  Positive for arthralgias, back pain, gait problem, myalgias, neck pain and neck stiffness.        No assist devices   Skin:         Right foot in boot    Neurological:  Positive for weakness and numbness. Negative for dizziness and headaches.   Psychiatric/Behavioral:  Negative for dysphoric mood and sleep disturbance. The patient is not nervous/anxious.      Objective:     Vitals:    03/08/23 1110   BP: 110/62   Weight: 145 lb (65.8 kg)   Height: 5' 7.01\" (1.702 m)       Physical Exam  Vitals and nursing note reviewed.   Constitutional:       General: She is not in acute distress.     Appearance: Normal appearance. She is well-developed. She is not diaphoretic.      Comments: thin   HENT:      Head: Normocephalic and atraumatic.      Right Ear: External ear normal.      Left Ear: External ear normal.      Nose: Nose normal.      Mouth/Throat:      Pharynx: No oropharyngeal exudate.   Eyes:      General: No scleral icterus.        Right eye: No discharge.         Left eye: No discharge.      Conjunctiva/sclera: Conjunctivae normal.      Pupils: Pupils are equal, round, and reactive to light.   Neck:      Thyroid: No thyromegaly.   Cardiovascular:      Rate and Rhythm: Normal rate and regular rhythm.      Heart  sounds: Normal heart sounds. No murmur heard. No friction rub. No gallop. Pulmonary:      Effort: Pulmonary effort is normal. No respiratory distress. Breath sounds: Normal breath sounds. No wheezing or rales. Chest:      Chest wall: No tenderness. Abdominal:      General: Bowel sounds are normal. There is no distension. Palpations: Abdomen is soft. Tenderness: There is no abdominal tenderness. There is no guarding or rebound. Musculoskeletal:         General: Tenderness present. Right shoulder: Tenderness present. Decreased range of motion. Left shoulder: Tenderness present. Decreased range of motion. Right upper arm: Tenderness present. Left upper arm: Tenderness present. Right elbow: Tenderness present. Left elbow: Tenderness present. Right forearm: Tenderness present. Left forearm: Tenderness present. Right wrist: Tenderness present. Left wrist: Tenderness present. Right hand: Tenderness present. Decreased strength. Left hand: Tenderness present. Decreased strength. Cervical back: Full passive range of motion without pain and normal range of motion. Rigidity and tenderness present. No edema, erythema or spasms. No muscular tenderness. Normal range of motion. Thoracic back: Tenderness present. Lumbar back: Spasms, tenderness and bony tenderness present. Normal range of motion. Positive right straight leg raise test and positive left straight leg raise test.        Back:       Right hip: Tenderness present. Left hip: Tenderness present. Right upper leg: Tenderness present. Left upper leg: Tenderness present. Right knee: Tenderness present. Left knee: Normal range of motion. Tenderness present. Right lower leg: Swelling and tenderness present. No edema. Left lower leg: Swelling and tenderness present. No edema. Right ankle: Tenderness present.       Left ankle: Tenderness present. Normal range of motion. Right foot: Tenderness present. No swelling. Left foot: Tenderness present. No swelling. Skin:     General: Skin is warm. Coloration: Skin is not pale. Findings: No erythema or rash. Neurological:      Mental Status: She is alert and oriented to person, place, and time. She is not disoriented. Cranial Nerves: No cranial nerve deficit. Sensory: Sensory deficit present. Motor: Weakness present. No atrophy or abnormal muscle tone. Coordination: Coordination normal.      Gait: Gait abnormal.      Deep Tendon Reflexes: Babinski sign absent on the right side. Reflex Scores:       Tricep reflexes are 1+ on the right side and 1+ on the left side. Bicep reflexes are 1+ on the right side and 1+ on the left side. Brachioradialis reflexes are 1+ on the right side and 1+ on the left side. Patellar reflexes are 2+ on the right side and 2+ on the left side. Achilles reflexes are 2+ on the right side and 2+ on the left side. Comments: SLR -  Motor 4/5 LUE LLE RUE 4/5 RLE 4/5   Psychiatric:         Attention and Perception: She is attentive. Mood and Affect: Mood normal. Mood is not anxious or depressed. Affect is not labile, blunt, angry or inappropriate. Speech: Speech normal.         Behavior: Behavior normal. Behavior is not agitated, slowed, aggressive, withdrawn, hyperactive or combative. Thought Content: Thought content normal. Thought content is not paranoid or delusional. Thought content does not include homicidal or suicidal ideation. Thought content does not include homicidal or suicidal plan. Cognition and Memory: Memory is not impaired. She does not exhibit impaired recent memory or impaired remote memory. Judgment: Judgment normal. Judgment is not impulsive or inappropriate.      DALTON  Patricks test  positive  Yeoman's  or Gaenslen's positive         Assessment: 1. Cervical radiculopathy    2. Spondylosis, cervical, with myelopathy    3. Lumbar radiculitis    4. Spinal stenosis, lumbar region, with neurogenic claudication    5. Peripheral polyneuropathy    6. Neuropathy    7. Incomplete quadriplegia at C5-C8 level ([facility] [address]    8. Chronic pain syndrome    9. Spinal cord stimulator status    10. Chronic, continuous use of opioids            Plan:      OARRS reviewed. Current MED: 7.5 mg   Patient was offered naloxone for home. Discussed long term side effects of medications, tolerance, dependency and addiction. Previous UDS reviewed  UDS preformed today for compliance. Patient told can not receive any pain medications from any other source. No evidence of abuse, diversion or aberrant behavior. Medications and/or procedures to improve function and quality of life- patient understanding with this and that may not be pain free  Discussed with patient about safe storage of medications at home  Discussed possible weaning of medication dosing dependent on treatment/procedure results. Discussed with patient about risks with procedure including infection, reaction to medication, increased pain, or bleeding. Continues to use Cervical SCS and lumbar SCS which remains effective  Pain is geovany tolerable   Continues to wean down medications to wean off completely. Down to MS ER 15 mg tabs is taking 1/2 tab daily at this time. Is going to start 1/2 tab every other day for a few days then off. Still has plenty filled 2/16/2023  Continue Neurontin 800 TID- ordered refill for 3 months and patient is going to ask pcp tpo take over and she is hoping not to see me anymore  Plan last UDS today. Always been compliant. Will FU prn as long as she is off medication     Meds. Prescribed:   Orders Placed This Encounter   Medications    gabapentin (NEURONTIN) 800 MG tablet     Sig: Take 1 tablet by mouth 3 times daily for 90 days.      Dispense:  270 tablet     Refill:  0         Return if symptoms worsen or fail to improve.                Electronically signed by FADUMO Cordova CNP on3/8/2023 at 11:50 AM

## 2024-01-31 NOTE — PROGRESS NOTES
----- Message from Eugenie Juan sent at 1/31/2024 11:48 AM CST -----  Type: Needs Medical Advice  Who Called:  pt  Best Call Back Number: [phone]  Additional Information: pt is calling in regards to some paperwork that was sent to the office for the pt. Pt needs to speak to someone to confirm that the paperwork was delivered. Please call back and advise. Thanks!                 0    albuterol sulfate  (90 Base) MCG/ACT inhaler, Inhale 2 puffs into the lungs every 4 hours as needed for Wheezing or Shortness of Breath, Disp: 1 Inhaler, Rfl: 0    metFORMIN (GLUCOPHAGE) 500 MG tablet, take 1 tablet by mouth twice a day with meals, Disp: 60 tablet, Rfl: 2    DULoxetine (CYMBALTA) 60 MG extended release capsule, take 1 capsule by mouth once daily, Disp: 30 capsule, Rfl: 1    metFORMIN (GLUCOPHAGE) 500 MG tablet, take 1 tablet by mouth twice a day with meals, Disp: 60 tablet, Rfl: 2    amitriptyline (ELAVIL) 50 MG tablet, Take 50 mg by mouth nightly, Disp: , Rfl:     Ascorbic Acid (VITAMIN C PO), Take by mouth, Disp: , Rfl:     BIOTIN PO, Take by mouth, Disp: , Rfl:     Prenatal Vit-Fe Fumarate-FA (PRENATAL ONE DAILY PO), Take by mouth, Disp: , Rfl:     Multiple Vitamins-Minerals (THERAPEUTIC MULTIVITAMIN-MINERALS) tablet, Take 1 tablet by mouth daily, Disp: , Rfl:     butalbital-aspirin-caffeine (FIORINAL) -40 MG per capsule, Take 1 capsule by mouth every 6 hours as needed for Headaches, Disp: 20 capsule, Rfl: 0    ondansetron (ZOFRAN ODT) 4 MG disintegrating tablet, Take 1 tablet by mouth every 8 hours as needed for Nausea, Disp: 20 tablet, Rfl: 0    docusate sodium (COLACE) 100 MG capsule, Take 100 mg by mouth as needed for Constipation, Disp: , Rfl:     vitamin B-12 (CYANOCOBALAMIN) 500 MCG tablet, Take 500 mcg by mouth daily, Disp: , Rfl:     gabapentin (NEURONTIN) 800 MG tablet, take 1 tablet by mouth three times a day, Disp: 90 tablet, Rfl: 0    iron sucrose (VENOFER) 20 MG/ML injection, Infuse 10 mLs intravenously every 3 days for 5 doses Infuse slowly over at least 5 minutes. , Disp: 50 mL, Rfl: 0    vitamin D (ERGOCALCIFEROL) 57057 units CAPS capsule, Take 1 capsule by mouth once a week for 12 doses, Disp: 12 capsule, Rfl: 0    Allergies   Allergen Reactions    Ibuprofen Other (See Comments)     Unable to take due to gastric bypass       Subjective:

## (undated) DEVICE — HEAD POSITIONER, SURGICAL: Brand: DEROYAL

## (undated) DEVICE — GOWN,SIRUS,NON REINFRCD,LARGE,SET IN SL: Brand: MEDLINE

## (undated) DEVICE — SYRINGE MED 10ML LUERLOCK TIP W/O SFTY DISP

## (undated) DEVICE — GOWN,SIRUS,NONRNF,SETINSLV,XL,20/CS: Brand: MEDLINE

## (undated) DEVICE — SOLUTION IV 1000ML 0.9% SOD CHL PH 5 INJ USP VIAFLX PLAS

## (undated) DEVICE — REMOTE CONTROL KIT: Brand: FREELINK™

## (undated) DEVICE — SHEET,DRAPE,3/4,53X77,STERILE: Brand: MEDLINE

## (undated) DEVICE — GLOVE ORANGE PI 7 1/2   MSG9075

## (undated) DEVICE — 3M™ TEGADERM™ TRANSPARENT FILM DRESSING FRAME STYLE, 1627, 4 IN X 10 IN (10 CM X 25 CM), 20/CT 4CT/CASE: Brand: 3M™ TEGADERM™

## (undated) DEVICE — NEEDLE SYR 18GA L1.5IN RED PLAS HUB S STL BLNT FILL W/O

## (undated) DEVICE — INTEGUSEAL MICROBIAL SEALANT: Brand: AVANOS

## (undated) DEVICE — C-ARMOR C-ARM EQUIPMENT COVERS CLEAR STERILE UNIVERSAL FIT 12 PER CASE: Brand: C-ARMOR

## (undated) DEVICE — WRENCH SURG L76CM SPNL CRD HEX STIM SYS SURG EQUIP PRECIS

## (undated) DEVICE — SKIN AFFIX SURG ADHESIVE 72/CS 0.55ML: Brand: MEDLINE

## (undated) DEVICE — STRIP,CLOSURE,WOUND,MEDI-STRIP,1/2X4: Brand: MEDLINE

## (undated) DEVICE — PACK,SET UP,NO DRAPES: Brand: MEDLINE

## (undated) DEVICE — NEEDLE SPNL 22GA L3.5IN BLK HUB S STL REG WALL FIT STYL W/

## (undated) DEVICE — SHEET, T, LAPAROTOMY, STERILE: Brand: MEDLINE

## (undated) DEVICE — SUTURE PERMA-HAND SZ 2-0 L30IN NONABSORBABLE BLK L26MM SH K833H

## (undated) DEVICE — Device

## (undated) DEVICE — STRIP SKIN CLSR W0.25XL4IN WHT SPUNBOUND FBR NYL HI ADH

## (undated) DEVICE — 3M™ STERI-STRIP™ COMPOUND BENZOIN TINCTURE 40 BAGS/CARTON 4 CARTONS/CASE C1544: Brand: 3M™ STERI-STRIP™

## (undated) DEVICE — 3M™ STERI-STRIP™ REINFORCED ADHESIVE SKIN CLOSURES, R1547, 1/2 IN X 4 IN (12 MM X 100 MM), 6 STRIPS/ENVELOPE: Brand: 3M™ STERI-STRIP™

## (undated) DEVICE — HYPODERMIC SAFETY NEEDLE: Brand: MAGELLAN

## (undated) DEVICE — SPONGE GZ W4XL4IN COT 12 PLY TYP VII WVN C FLD DSGN

## (undated) DEVICE — GLOVE ORANGE PI 8   MSG9080

## (undated) DEVICE — 1010 S-DRAPE TOWEL DRAPE 10/BX: Brand: STERI-DRAPE™

## (undated) DEVICE — GLOVE SURG SZ 65 THK91MIL LTX FREE SYN POLYISOPRENE

## (undated) DEVICE — KIT CHARGING CHRG BASE STN PWR SUPL CHRG BELT PRECIS SPECTR

## (undated) DEVICE — TOWEL,OR,DSP,ST,BLUE,DLX,4/PK,20PK/CS: Brand: MEDLINE

## (undated) DEVICE — RESERVOIR,SUCTION,100CC,SILICONE: Brand: MEDLINE

## (undated) DEVICE — 4.0MM PRECISION ROUND

## (undated) DEVICE — SUTURE MCRYL SZ 4-0 L27IN ABSRB UD L19MM PS-2 1/2 CIR PRIM Y426H

## (undated) DEVICE — GAUZE,SPONGE,4"X4",12PLY,STERILE,LF,2'S: Brand: MEDLINE

## (undated) DEVICE — 35CM LONG TUNNELING TOOL

## (undated) DEVICE — SUTURE MCRYL SZ 5-0 L18IN ABSRB UD L11MM P-1 3/8 CIR PRIM Y490G

## (undated) DEVICE — DRAIN SURG 10FR PVC TB W/ TRCR MID PERF NO RESVR HUBLESS

## (undated) DEVICE — 3 ML SYRINGE LUER-LOCK TIP: Brand: MONOJECT

## (undated) DEVICE — GLOVE ORANGE PI 8 1/2   MSG9085

## (undated) DEVICE — SURE SET SINGLE BASIN-LF: Brand: MEDLINE INDUSTRIES, INC.

## (undated) DEVICE — NEEDLE SPNL L3.5IN PNK HUB S STL REG WALL FIT STYL W/ QNCKE

## (undated) DEVICE — CHLORAPREP 26ML CLEAR

## (undated) DEVICE — CABLE NEUROSTIM EXTN 1X16 L213CM OR PRECIS SPECTR

## (undated) DEVICE — PAD,NON-ADHERENT,3X8,STERILE,LF,1/PK: Brand: MEDLINE

## (undated) DEVICE — DRAPE C ARM W36XL30IN RECTANG BND BG AND TAPE

## (undated) DEVICE — DRAIN SURG FLAT W7MMXL20CM FULL PERF

## (undated) DEVICE — SUTURE VCRL SZ 2-0 L27IN ABSRB UD L36MM CP-1 1/2 CIR REV J266H

## (undated) DEVICE — CODMAN® SURGICAL PATTIES 1" X 1" (2.54CM X 2.54CM): Brand: CODMAN®

## (undated) DEVICE — O.R. CABLE 1X16, 61CM AND EXTENSION

## (undated) DEVICE — GAUZE,SPONGE,8"X4",12PLY,XRAY,STRL,LF: Brand: MEDLINE

## (undated) DEVICE — SUTURE VCRL SZ 1 L18IN ABSRB VLT CTX L48MM 1/2 CIR J765D

## (undated) DEVICE — BASIC SINGLE BASIN BTC-LF: Brand: MEDLINE INDUSTRIES, INC.

## (undated) DEVICE — TAPE SURG W4INXL11YD 2IN PERF LINERLESS NONWOVEN MEDFIX EZ

## (undated) DEVICE — 6 ML SYRINGE LUER-LOCK TIP: Brand: MONOJECT

## (undated) DEVICE — KIT PT TRL HANDHELD COMB THER WAVEFORM AUTOMATION FOR SPNL

## (undated) DEVICE — BIPOLAR SEALER 23-112-1 AQM 6.0: Brand: AQUAMANTYS ®

## (undated) DEVICE — KIT EVAC 400CC PVC RADPQ Y CONN

## (undated) DEVICE — TRAY NRV BLK PARACERVICAL PUDEN W/ 10ML CTRL SYR

## (undated) DEVICE — GLOVE ORANGE PI 7   MSG9070